# Patient Record
Sex: MALE | Race: OTHER | HISPANIC OR LATINO | URBAN - METROPOLITAN AREA
[De-identification: names, ages, dates, MRNs, and addresses within clinical notes are randomized per-mention and may not be internally consistent; named-entity substitution may affect disease eponyms.]

---

## 2021-03-07 ENCOUNTER — APPOINTMENT (EMERGENCY)
Dept: RADIOLOGY | Facility: HOSPITAL | Age: 76
DRG: 871 | End: 2021-03-07
Payer: COMMERCIAL

## 2021-03-07 ENCOUNTER — HOSPITAL ENCOUNTER (INPATIENT)
Facility: HOSPITAL | Age: 76
LOS: 9 days | Discharge: HOME HEALTH | DRG: 871 | End: 2021-03-16
Attending: EMERGENCY MEDICINE | Admitting: INTERNAL MEDICINE
Payer: COMMERCIAL

## 2021-03-07 DIAGNOSIS — J12.82 PNEUMONIA DUE TO COVID-19 VIRUS: ICD-10-CM

## 2021-03-07 DIAGNOSIS — I48.91 ATRIAL FIBRILLATION (HCC): ICD-10-CM

## 2021-03-07 DIAGNOSIS — I48.91 ATRIAL FIBRILLATION WITH RVR (HCC): ICD-10-CM

## 2021-03-07 DIAGNOSIS — R79.89 ELEVATED D-DIMER: ICD-10-CM

## 2021-03-07 DIAGNOSIS — U07.1 COVID-19: ICD-10-CM

## 2021-03-07 DIAGNOSIS — R06.00 DYSPNEA: ICD-10-CM

## 2021-03-07 DIAGNOSIS — U07.1 PNEUMONIA DUE TO COVID-19 VIRUS: ICD-10-CM

## 2021-03-07 DIAGNOSIS — R74.01 TRANSAMINITIS: ICD-10-CM

## 2021-03-07 DIAGNOSIS — R09.02 HYPOXIA: Primary | ICD-10-CM

## 2021-03-07 LAB
ALBUMIN SERPL BCP-MCNC: 2.6 G/DL (ref 3.5–5)
ALP SERPL-CCNC: 58 U/L (ref 46–116)
ALT SERPL W P-5'-P-CCNC: 79 U/L (ref 12–78)
ANION GAP SERPL CALCULATED.3IONS-SCNC: 6 MMOL/L (ref 4–13)
APTT PPP: 32 SECONDS (ref 23–37)
AST SERPL W P-5'-P-CCNC: 77 U/L (ref 5–45)
ATRIAL RATE: 468 BPM
BACTERIA UR QL AUTO: ABNORMAL /HPF
BASOPHILS # BLD MANUAL: 0 THOUSAND/UL (ref 0–0.1)
BASOPHILS NFR MAR MANUAL: 0 % (ref 0–1)
BILIRUB SERPL-MCNC: 0.9 MG/DL (ref 0.2–1)
BILIRUB UR QL STRIP: NEGATIVE
BUN SERPL-MCNC: 24 MG/DL (ref 5–25)
CALCIUM ALBUM COR SERPL-MCNC: 9.4 MG/DL (ref 8.3–10.1)
CALCIUM SERPL-MCNC: 8.3 MG/DL (ref 8.3–10.1)
CHLORIDE SERPL-SCNC: 107 MMOL/L (ref 100–108)
CLARITY UR: CLEAR
CO2 SERPL-SCNC: 26 MMOL/L (ref 21–32)
COLOR UR: ABNORMAL
CREAT SERPL-MCNC: 1.15 MG/DL (ref 0.6–1.3)
D DIMER PPP FEU-MCNC: 4.58 UG/ML FEU
EOSINOPHIL # BLD MANUAL: 0 THOUSAND/UL (ref 0–0.4)
EOSINOPHIL NFR BLD MANUAL: 0 % (ref 0–6)
ERYTHROCYTE [DISTWIDTH] IN BLOOD BY AUTOMATED COUNT: 16 % (ref 11.6–15.1)
GFR SERPL CREATININE-BSD FRML MDRD: 61 ML/MIN/1.73SQ M
GLUCOSE SERPL-MCNC: 120 MG/DL (ref 65–140)
GLUCOSE SERPL-MCNC: 131 MG/DL (ref 65–140)
GLUCOSE UR STRIP-MCNC: NEGATIVE MG/DL
HCT VFR BLD AUTO: 38.8 % (ref 36.5–49.3)
HGB BLD-MCNC: 12.1 G/DL (ref 12–17)
HGB UR QL STRIP.AUTO: ABNORMAL
INR PPP: 1.23 (ref 0.84–1.19)
KETONES UR STRIP-MCNC: NEGATIVE MG/DL
LACTATE SERPL-SCNC: 2.5 MMOL/L (ref 0.5–2)
LEUKOCYTE ESTERASE UR QL STRIP: NEGATIVE
LYMPHOCYTES # BLD AUTO: 0.57 THOUSAND/UL (ref 0.6–4.47)
LYMPHOCYTES # BLD AUTO: 6 % (ref 14–44)
MAGNESIUM SERPL-MCNC: 2.4 MG/DL (ref 1.6–2.6)
MCH RBC QN AUTO: 24.8 PG (ref 26.8–34.3)
MCHC RBC AUTO-ENTMCNC: 31.2 G/DL (ref 31.4–37.4)
MCV RBC AUTO: 80 FL (ref 82–98)
MONOCYTES # BLD AUTO: 0.09 THOUSAND/UL (ref 0–1.22)
MONOCYTES NFR BLD: 1 % (ref 4–12)
MUCOUS THREADS UR QL AUTO: ABNORMAL
NEUTROPHILS # BLD MANUAL: 8.53 THOUSAND/UL (ref 1.85–7.62)
NEUTS BAND NFR BLD MANUAL: 1 % (ref 0–8)
NEUTS SEG NFR BLD AUTO: 89 % (ref 43–75)
NITRITE UR QL STRIP: NEGATIVE
NON-SQ EPI CELLS URNS QL MICRO: ABNORMAL /HPF
NRBC BLD AUTO-RTO: 0 /100 WBCS
PH UR STRIP.AUTO: 6 [PH]
PLATELET # BLD AUTO: 264 THOUSANDS/UL (ref 149–390)
PLATELET BLD QL SMEAR: ADEQUATE
PLATELET CLUMP BLD QL SMEAR: PRESENT
PMV BLD AUTO: 11.3 FL (ref 8.9–12.7)
POLYCHROMASIA BLD QL SMEAR: PRESENT
POTASSIUM SERPL-SCNC: 4.6 MMOL/L (ref 3.5–5.3)
PROCALCITONIN SERPL-MCNC: 1.41 NG/ML
PROT SERPL-MCNC: 7.1 G/DL (ref 6.4–8.2)
PROT UR STRIP-MCNC: ABNORMAL MG/DL
PROTHROMBIN TIME: 15.5 SECONDS (ref 11.6–14.5)
QRS AXIS: 260 DEGREES
QRSD INTERVAL: 114 MS
QT INTERVAL: 262 MS
QTC INTERVAL: 406 MS
RBC # BLD AUTO: 4.87 MILLION/UL (ref 3.88–5.62)
RBC #/AREA URNS AUTO: ABNORMAL /HPF
RBC MORPH BLD: PRESENT
SODIUM SERPL-SCNC: 139 MMOL/L (ref 136–145)
SP GR UR STRIP.AUTO: 1.01 (ref 1–1.03)
T WAVE AXIS: 55 DEGREES
TROPONIN I SERPL-MCNC: <0.02 NG/ML
TSH SERPL DL<=0.05 MIU/L-ACNC: 1.46 UIU/ML (ref 0.36–3.74)
UROBILINOGEN UR QL STRIP.AUTO: 0.2 E.U./DL
VARIANT LYMPHS # BLD AUTO: 3 %
VENTRICULAR RATE: 145 BPM
WBC # BLD AUTO: 9.48 THOUSAND/UL (ref 4.31–10.16)
WBC #/AREA URNS AUTO: ABNORMAL /HPF

## 2021-03-07 PROCEDURE — 71045 X-RAY EXAM CHEST 1 VIEW: CPT

## 2021-03-07 PROCEDURE — 84145 PROCALCITONIN (PCT): CPT | Performed by: EMERGENCY MEDICINE

## 2021-03-07 PROCEDURE — 80053 COMPREHEN METABOLIC PANEL: CPT | Performed by: EMERGENCY MEDICINE

## 2021-03-07 PROCEDURE — 83605 ASSAY OF LACTIC ACID: CPT | Performed by: EMERGENCY MEDICINE

## 2021-03-07 PROCEDURE — 81001 URINALYSIS AUTO W/SCOPE: CPT | Performed by: EMERGENCY MEDICINE

## 2021-03-07 PROCEDURE — 83735 ASSAY OF MAGNESIUM: CPT | Performed by: EMERGENCY MEDICINE

## 2021-03-07 PROCEDURE — 99291 CRITICAL CARE FIRST HOUR: CPT | Performed by: EMERGENCY MEDICINE

## 2021-03-07 PROCEDURE — 82948 REAGENT STRIP/BLOOD GLUCOSE: CPT

## 2021-03-07 PROCEDURE — 93010 ELECTROCARDIOGRAM REPORT: CPT | Performed by: INTERNAL MEDICINE

## 2021-03-07 PROCEDURE — 87040 BLOOD CULTURE FOR BACTERIA: CPT | Performed by: EMERGENCY MEDICINE

## 2021-03-07 PROCEDURE — 85027 COMPLETE CBC AUTOMATED: CPT | Performed by: EMERGENCY MEDICINE

## 2021-03-07 PROCEDURE — G1004 CDSM NDSC: HCPCS

## 2021-03-07 PROCEDURE — 84443 ASSAY THYROID STIM HORMONE: CPT | Performed by: EMERGENCY MEDICINE

## 2021-03-07 PROCEDURE — 99285 EMERGENCY DEPT VISIT HI MDM: CPT

## 2021-03-07 PROCEDURE — 70450 CT HEAD/BRAIN W/O DYE: CPT

## 2021-03-07 PROCEDURE — 71275 CT ANGIOGRAPHY CHEST: CPT

## 2021-03-07 PROCEDURE — 85007 BL SMEAR W/DIFF WBC COUNT: CPT | Performed by: EMERGENCY MEDICINE

## 2021-03-07 PROCEDURE — 84484 ASSAY OF TROPONIN QUANT: CPT | Performed by: EMERGENCY MEDICINE

## 2021-03-07 PROCEDURE — 96365 THER/PROPH/DIAG IV INF INIT: CPT

## 2021-03-07 PROCEDURE — 93005 ELECTROCARDIOGRAM TRACING: CPT

## 2021-03-07 PROCEDURE — 36415 COLL VENOUS BLD VENIPUNCTURE: CPT | Performed by: EMERGENCY MEDICINE

## 2021-03-07 PROCEDURE — 85610 PROTHROMBIN TIME: CPT | Performed by: EMERGENCY MEDICINE

## 2021-03-07 PROCEDURE — 85379 FIBRIN DEGRADATION QUANT: CPT | Performed by: EMERGENCY MEDICINE

## 2021-03-07 PROCEDURE — 85730 THROMBOPLASTIN TIME PARTIAL: CPT | Performed by: EMERGENCY MEDICINE

## 2021-03-07 RX ORDER — ASCORBIC ACID 500 MG
1000 TABLET ORAL EVERY 12 HOURS SCHEDULED
Status: COMPLETED | OUTPATIENT
Start: 2021-03-07 | End: 2021-03-14

## 2021-03-07 RX ORDER — METOPROLOL TARTRATE 5 MG/5ML
5 INJECTION INTRAVENOUS ONCE
Status: DISCONTINUED | OUTPATIENT
Start: 2021-03-07 | End: 2021-03-08

## 2021-03-07 RX ORDER — ATORVASTATIN CALCIUM 40 MG/1
40 TABLET, FILM COATED ORAL
Status: DISCONTINUED | OUTPATIENT
Start: 2021-03-07 | End: 2021-03-10

## 2021-03-07 RX ORDER — DOCUSATE SODIUM 100 MG/1
100 CAPSULE, LIQUID FILLED ORAL 2 TIMES DAILY
Status: DISCONTINUED | OUTPATIENT
Start: 2021-03-07 | End: 2021-03-16 | Stop reason: HOSPADM

## 2021-03-07 RX ORDER — HEPARIN SODIUM 1000 [USP'U]/ML
3900 INJECTION, SOLUTION INTRAVENOUS; SUBCUTANEOUS
Status: DISCONTINUED | OUTPATIENT
Start: 2021-03-07 | End: 2021-03-09

## 2021-03-07 RX ORDER — DOXYCYCLINE HYCLATE 100 MG/1
100 CAPSULE ORAL EVERY 12 HOURS
Status: DISCONTINUED | OUTPATIENT
Start: 2021-03-07 | End: 2021-03-07

## 2021-03-07 RX ORDER — MELATONIN
2000 DAILY
Status: DISCONTINUED | OUTPATIENT
Start: 2021-03-08 | End: 2021-03-16 | Stop reason: HOSPADM

## 2021-03-07 RX ORDER — HEPARIN SODIUM 1000 [USP'U]/ML
3900 INJECTION, SOLUTION INTRAVENOUS; SUBCUTANEOUS ONCE
Status: COMPLETED | OUTPATIENT
Start: 2021-03-07 | End: 2021-03-07

## 2021-03-07 RX ORDER — FAMOTIDINE 20 MG/1
20 TABLET, FILM COATED ORAL 2 TIMES DAILY
Status: DISCONTINUED | OUTPATIENT
Start: 2021-03-07 | End: 2021-03-16 | Stop reason: HOSPADM

## 2021-03-07 RX ORDER — HEPARIN SODIUM 10000 [USP'U]/100ML
3-20 INJECTION, SOLUTION INTRAVENOUS
Status: DISCONTINUED | OUTPATIENT
Start: 2021-03-07 | End: 2021-03-09

## 2021-03-07 RX ORDER — MULTIVITAMIN/IRON/FOLIC ACID 18MG-0.4MG
1 TABLET ORAL DAILY
Status: DISCONTINUED | OUTPATIENT
Start: 2021-03-15 | End: 2021-03-16 | Stop reason: HOSPADM

## 2021-03-07 RX ORDER — HEPARIN SODIUM 1000 [USP'U]/ML
1950 INJECTION, SOLUTION INTRAVENOUS; SUBCUTANEOUS
Status: DISCONTINUED | OUTPATIENT
Start: 2021-03-07 | End: 2021-03-09

## 2021-03-07 RX ORDER — SENNOSIDES 8.6 MG
1 TABLET ORAL DAILY
Status: DISCONTINUED | OUTPATIENT
Start: 2021-03-08 | End: 2021-03-16 | Stop reason: HOSPADM

## 2021-03-07 RX ORDER — ACETAMINOPHEN 325 MG/1
650 TABLET ORAL EVERY 6 HOURS PRN
Status: DISCONTINUED | OUTPATIENT
Start: 2021-03-07 | End: 2021-03-10

## 2021-03-07 RX ORDER — DEXAMETHASONE SODIUM PHOSPHATE 4 MG/ML
6 INJECTION, SOLUTION INTRA-ARTICULAR; INTRALESIONAL; INTRAMUSCULAR; INTRAVENOUS; SOFT TISSUE EVERY 24 HOURS
Status: DISCONTINUED | OUTPATIENT
Start: 2021-03-07 | End: 2021-03-16 | Stop reason: HOSPADM

## 2021-03-07 RX ORDER — ZINC SULFATE 50(220)MG
220 CAPSULE ORAL DAILY
Status: COMPLETED | OUTPATIENT
Start: 2021-03-08 | End: 2021-03-14

## 2021-03-07 RX ORDER — ONDANSETRON 2 MG/ML
4 INJECTION INTRAMUSCULAR; INTRAVENOUS EVERY 6 HOURS PRN
Status: DISCONTINUED | OUTPATIENT
Start: 2021-03-07 | End: 2021-03-16 | Stop reason: HOSPADM

## 2021-03-07 RX ORDER — DOXYCYCLINE HYCLATE 100 MG/1
100 CAPSULE ORAL EVERY 12 HOURS
Status: DISCONTINUED | OUTPATIENT
Start: 2021-03-07 | End: 2021-03-13

## 2021-03-07 RX ADMIN — CEFTRIAXONE 1000 MG: 2 INJECTION, POWDER, FOR SOLUTION INTRAMUSCULAR; INTRAVENOUS at 18:59

## 2021-03-07 RX ADMIN — DEXAMETHASONE SODIUM PHOSPHATE 6 MG: 4 INJECTION INTRA-ARTICULAR; INTRALESIONAL; INTRAMUSCULAR; INTRAVENOUS; SOFT TISSUE at 22:43

## 2021-03-07 RX ADMIN — ACETAMINOPHEN 650 MG: 325 TABLET ORAL at 22:35

## 2021-03-07 RX ADMIN — ATORVASTATIN CALCIUM 40 MG: 40 TABLET, FILM COATED ORAL at 22:43

## 2021-03-07 RX ADMIN — FAMOTIDINE 20 MG: 20 TABLET ORAL at 23:03

## 2021-03-07 RX ADMIN — OXYCODONE HYDROCHLORIDE AND ACETAMINOPHEN 1000 MG: 500 TABLET ORAL at 22:43

## 2021-03-07 RX ADMIN — HEPARIN SODIUM 3900 UNITS: 1000 INJECTION INTRAVENOUS; SUBCUTANEOUS at 22:47

## 2021-03-07 RX ADMIN — DOCUSATE SODIUM 100 MG: 100 CAPSULE, LIQUID FILLED ORAL at 23:03

## 2021-03-07 RX ADMIN — IOHEXOL 85 ML: 350 INJECTION, SOLUTION INTRAVENOUS at 19:24

## 2021-03-07 RX ADMIN — DILTIAZEM HYDROCHLORIDE 5 MG/HR: 5 INJECTION INTRAVENOUS at 20:39

## 2021-03-07 RX ADMIN — HEPARIN SODIUM 12 UNITS/KG/HR: 10000 INJECTION, SOLUTION INTRAVENOUS at 22:47

## 2021-03-07 RX ADMIN — DOXYCYCLINE 100 MG: 100 CAPSULE ORAL at 18:49

## 2021-03-07 RX ADMIN — SODIUM CHLORIDE 500 ML: 0.9 INJECTION, SOLUTION INTRAVENOUS at 18:58

## 2021-03-07 NOTE — ED ATTENDING ATTESTATION
3/7/2021  IHarsha MD, saw and evaluated the patient  I have discussed the patient with the resident/non-physician practitioner and agree with the resident's/non-physician practitioner's findings, Plan of Care, and MDM as documented in the resident's/non-physician practitioner's note, except where noted  All available labs and Radiology studies were reviewed  I was present for key portions of any procedure(s) performed by the resident/non-physician practitioner and I was immediately available to provide assistance  At this point I agree with the current assessment done in the Emergency Department  I have conducted an independent evaluation of this patient a history and physical is as follows:    OA:  This is evaluation of a 57-year-old male with no significant past medical history who presents to the emergency department with daughter complaining of shortness of breath, fatigue and episode of confusion earlier today  Per patient as well as daughter, he tested positive for COVID on 03/01 however had been dealing with nasal congestion like symptoms for 1 week prior to that  Patient has had decreased oral intake and decreased activity over the past 5 days  Denies any abdominal pain Daughter visited today and noted that he seemed more short of breath than normal   Also appeared confused to her earlier today but denies any focal symptoms or difficulty speaking numbness weakness tingling or falls  No fevers reported  Does not take any daily medications  On exam patient is oriented and conversive but he is tachypneic with respirations in the high 20s  Heart rate appears to be irregular and oscillating between 100 and 130s  No history of the same  Lungs decreased throughout  Tachypnea  Speaks in complete sentences  Abdomen is soft positive bowel sounds no rebound or guarding nontender to palpation  Intact distal pulses and capillary refill less than 2 seconds  No lower extremity edema  Moving all extremities equally with symmetric face and clear speech  Awake alert oriented appropriate  Assessment and plan COVID  Concern for worsening disease given tachypnea tachycardia cannot rule out primary pulmonary process associated with this versus anemia versus cardiac process  Will do a broad workup including labs, EKG, gentle IV fluid hydration  May require a rate control or if fluids do not help heart rate  Will require continued monitoring and admission  Portions of the record may have been created with voice recognition software  Occasional wrong word or sound-a-like" substitutions may have occurred due to the inherent limitations of voice recognition software  Review chart carefully and recognize, using context, where substitutions have occurred  ED Course   -120 however bumps to 140  Will require treatment, close monitoring of BP and HR  Respiratory status improved on supplemental 02         Critical Care Time  CriticalCare Time  Performed by: Ellen Dye MD  Authorized by: Ellen Dye MD     Critical care provider statement:     Critical care time (minutes):  32    Critical care time was exclusive of:  Separately billable procedures and treating other patients and teaching time    Critical care was necessary to treat or prevent imminent or life-threatening deterioration of the following conditions:  Circulatory failure, sepsis and dehydration    Critical care was time spent personally by me on the following activities:  Blood draw for specimens, obtaining history from patient or surrogate, development of treatment plan with patient or surrogate, discussions with consultants, evaluation of patient's response to treatment, examination of patient, ordering and performing treatments and interventions, ordering and review of laboratory studies, ordering and review of radiographic studies and re-evaluation of patient's condition

## 2021-03-07 NOTE — ED PROVIDER NOTES
History  Chief Complaint   Patient presents with    Shortness of Breath     Pt tested positive for covid on 3/1  Pt states yesterday his sx's started getting worse  Pt having more severe sob  Per daughter, when she checked his O2 level this afternoon it was 88% on room air and pt's pcp told her to bring him to the ED  Patient is a 26-year-old Omani-speaking male, with no history of AFib or pulmonary pathology, who presents to the ED today, with his daughter, due to hypoxia  There is associated nonexertional shortness of breath, diarrhea, and altered mental status  Patient denies fever, chest pain, and abdominal pain  Notably, patient has been experiencing symptoms of the last two weeks knee tested positive for coronavirus on the 1st of this month  Yesterday, patient experienced a exacerbation of his shortness of breath  His daughter went to visit him, at his home where he resides with his wife, this morning and noted that he did not recognize her and appeared confused  She placed her pulse oximeter on him and noted that it read 88%; she subsequently called a family doctor and it was recommended that he come to the emergency department  There are no clear exacerbating or remitting factors  Patient has not experienced anything like this before and he denies history of asthma or COPD      - Patient is Omani-speaking, hospital translation services for use throughout the patient encounter  - History obtained from patient as well as his daughter   - There are no limitations to the history obtained  - Previous charting was reviewed          None       History reviewed  No pertinent past medical history  History reviewed  No pertinent surgical history  History reviewed  No pertinent family history  I have reviewed and agree with the history as documented      E-Cigarette/Vaping     E-Cigarette/Vaping Substances     Social History     Tobacco Use    Smoking status: Never Smoker    Smokeless tobacco: Never Used   Substance Use Topics    Alcohol use: Not Currently    Drug use: Never        Review of Systems   Constitutional: Negative for fever  HENT: Negative for trouble swallowing  Eyes: Negative for visual disturbance  Respiratory: Positive for shortness of breath  Negative for cough and wheezing  Cardiovascular: Negative for chest pain  Gastrointestinal: Positive for diarrhea  Negative for abdominal pain and vomiting  Endocrine: Negative for polyuria  Genitourinary: Negative for dysuria  Musculoskeletal: Negative for gait problem  Skin: Negative for rash  Allergic/Immunologic: Negative for environmental allergies  Neurological: Negative for weakness and numbness  Hematological: Negative for adenopathy  Psychiatric/Behavioral: Positive for confusion  Physical Exam  ED Triage Vitals [03/07/21 1750]   Temperature Pulse Respirations Blood Pressure SpO2   99 3 °F (37 4 °C) (!) 130 (!) 40 149/67 93 %      Temp Source Heart Rate Source Patient Position - Orthostatic VS BP Location FiO2 (%)   Oral Monitor Lying Right arm --      Pain Score       --             Orthostatic Vital Signs  Vitals:    03/07/21 1750   BP: 149/67   Pulse: (!) 130   Patient Position - Orthostatic VS: Lying       Physical Exam  Vitals signs and nursing note reviewed  Constitutional:       Appearance: He is normal weight  He is ill-appearing  He is not toxic-appearing  Interventions: He is not intubated  HENT:      Head: Normocephalic  Right Ear: External ear normal       Left Ear: External ear normal       Nose: Nose normal  No congestion  Mouth/Throat:      Mouth: Mucous membranes are moist       Pharynx: Oropharynx is clear  No oropharyngeal exudate or posterior oropharyngeal erythema  Eyes:      General:         Right eye: No discharge  Left eye: No discharge  Pupils: Pupils are equal, round, and reactive to light  Neck:      Musculoskeletal: Neck supple   No muscular tenderness  Cardiovascular:      Rate and Rhythm: Tachycardia present  Rhythm irregular  Pulses: Normal pulses  Heart sounds: Normal heart sounds  No murmur  No friction rub  No gallop  Comments: 2+ Radial  Pulmonary:      Effort: Tachypnea and accessory muscle usage present  He is not intubated  Breath sounds: No stridor  Examination of the right-upper field reveals rhonchi  Examination of the left-upper field reveals rhonchi  Examination of the right-middle field reveals rhonchi  Examination of the left-middle field reveals rhonchi  Examination of the right-lower field reveals rhonchi  Examination of the left-lower field reveals rhonchi  Rhonchi present  No decreased breath sounds, wheezing or rales  Comments: Increased respiratory effort, tachypnea  Abdominal:      General: Abdomen is flat  Bowel sounds are normal  There is no distension  Palpations: Abdomen is soft  Tenderness: There is no abdominal tenderness  There is no guarding or rebound  Musculoskeletal:         General: No tenderness  Right lower leg: No edema  Left lower leg: No edema  Lymphadenopathy:      Cervical: No cervical adenopathy  Skin:     General: Skin is warm and dry  Capillary Refill: Capillary refill takes less than 2 seconds  Neurological:      Mental Status: He is alert  Comments: AAOx3  Patient is speaking clearly complete sentences  Patient is answering appropriately and able follow commands  No facial droop present   PERRL   Psychiatric:         Mood and Affect: Mood normal          Behavior: Behavior normal          ED Medications  Medications   ceftriaxone (ROCEPHIN) 1 g/50 mL in dextrose IVPB (0 mg Intravenous Stopped 3/7/21 1945)   doxycycline hyclate (VIBRAMYCIN) capsule 100 mg (100 mg Oral Given 3/7/21 1849)   diltiazem (CARDIZEM) 125 mg in sodium chloride 0 9 % 125 mL infusion (has no administration in time range)   sodium chloride 0 9 % bolus 500 mL (0 mL Intravenous Stopped 3/7/21 2011)   iohexol (OMNIPAQUE) 350 MG/ML injection (MULTI-DOSE) 85 mL (85 mL Intravenous Given 3/7/21 1924)       Diagnostic Studies  Results Reviewed     Procedure Component Value Units Date/Time    Magnesium [[id number]]  (Normal) Collected: 03/07/21 1810    Lab Status: Final result Specimen: Blood from Arm, Left Updated: 03/07/21 1932     Magnesium 2 4 mg/dL     Lactic acid [[id number]]  (Abnormal) Collected: 03/07/21 1810    Lab Status: Final result Specimen: Blood from Arm, Left Updated: 03/07/21 1929     LACTIC ACID 2 5 mmol/L     Narrative:      Result may be elevated if tourniquet was used during collection  Lactic acid 2 Hours [[id number]]     Lab Status: No result Specimen: Blood     Procalcitonin with AM Reflex [[id number]]  (Abnormal) Collected: 03/07/21 1810    Lab Status: Final result Specimen: Blood from Arm, Left Updated: 03/07/21 1906     Procalcitonin 1 41 ng/ml     Procalcitonin Reflex [[id number]]     Lab Status: No result Specimen: Blood     Blood culture #1 [[id number]] Collected: 03/07/21 1857    Lab Status: In process Specimen: Blood from Arm, Right Updated: 03/07/21 1904    D-dimer, quantitative [[id number]]  (Abnormal) Collected: 03/07/21 1810    Lab Status: Final result Specimen: Blood from Arm, Left Updated: 03/07/21 1901     D-Dimer, Quant 4 58 ug/ml FEU     CBC and differential [[id number]]  (Abnormal) Collected: 03/07/21 1810    Lab Status: Final result Specimen: Blood from Arm, Left Updated: 03/07/21 1900     WBC 9 48 Thousand/uL      RBC 4 87 Million/uL      Hemoglobin 12 1 g/dL      Hematocrit 38 8 %      MCV 80 fL      MCH 24 8 pg      MCHC 31 2 g/dL      RDW 16 0 %      MPV 11 3 fL      Platelets 728 Thousands/uL      nRBC 0 /100 WBCs     Narrative: This is an appended report  These results have been appended to a previously verified report      Manual Differential(PHLEBS Do Not Order) [[id number]]  (Abnormal) Collected: 03/07/21 1810    Lab Status: Final result Specimen: Blood from Arm, Left Updated: 03/07/21 1900     Segmented % 89 %      Bands % 1 %      Lymphocytes % 6 %      Monocytes % 1 %      Eosinophils, % 0 %      Basophils % 0 %      Atypical Lymphocytes % 3 %      Absolute Neutrophils 8 53 Thousand/uL      Lymphocytes Absolute 0 57 Thousand/uL      Monocytes Absolute 0 09 Thousand/uL      Eosinophils Absolute 0 00 Thousand/uL      Basophils Absolute 0 00 Thousand/uL      Total Counted --     RBC Morphology Present     Polychromasia Present     Platelet Estimate Adequate     Clumped Platelets Present    TSH, 3rd generation with Free T4 reflex [[id number]]  (Normal) Collected: 03/07/21 1814    Lab Status: Final result Specimen: Blood from Line Updated: 03/07/21 1857     TSH 3RD GENERATON 1 460 uIU/mL     Narrative:      Patients undergoing fluorescein dye angiography may retain small amounts of fluorescein in the body for 48-72 hours post procedure  Samples containing fluorescein can produce falsely depressed TSH values  If the patient had this procedure,a specimen should be resubmitted post fluorescein clearance        APTT [[id number]]  (Normal) Collected: 03/07/21 1810    Lab Status: Final result Specimen: Blood from Arm, Left Updated: 03/07/21 1854     PTT 32 seconds     Protime-INR [[id number]]  (Abnormal) Collected: 03/07/21 1810    Lab Status: Final result Specimen: Blood from Arm, Left Updated: 03/07/21 1854     Protime 15 5 seconds      INR 1 23    Comprehensive metabolic panel [[id number]]  (Abnormal) Collected: 03/07/21 1810    Lab Status: Final result Specimen: Blood from Arm, Left Updated: 03/07/21 1847     Sodium 139 mmol/L      Potassium 4 6 mmol/L      Chloride 107 mmol/L      CO2 26 mmol/L      ANION GAP 6 mmol/L      BUN 24 mg/dL      Creatinine 1 15 mg/dL      Glucose 131 mg/dL      Calcium 8 3 mg/dL      Corrected Calcium 9 4 mg/dL      AST 77 U/L      ALT 79 U/L      Alkaline Phosphatase 58 U/L      Total Protein 7 1 g/dL      Albumin 2 6 g/dL      Total Bilirubin 0 90 mg/dL      eGFR 61 ml/min/1 73sq m     Narrative:      [place] guidelines for Chronic Kidney Disease (CKD):     Stage 1 with normal or high GFR (GFR > 90 mL/min/1 73 square meters)    Stage 2 Mild CKD (GFR = 60-89 mL/min/1 73 square meters)    Stage 3A Moderate CKD (GFR = 45-59 mL/min/1 73 square meters)    Stage 3B Moderate CKD (GFR = 30-44 mL/min/1 73 square meters)    Stage 4 Severe CKD (GFR = 15-29 mL/min/1 73 square meters)    Stage 5 End Stage CKD (GFR <15 mL/min/1 73 square meters)  Note: GFR calculation is accurate only with a steady state creatinine    Troponin I [[id number]]  (Normal) Collected: 03/07/21 1814    Lab Status: Final result Specimen: Blood from Arm, Left Updated: 03/07/21 1847     Troponin I <0 02 ng/mL     Fingerstick Glucose (POCT) [[id number]]  (Normal) Collected: 03/07/21 1844    Lab Status: Final result Updated: 03/07/21 1846     POC Glucose 120 mg/dl     Blood culture #2 [[id number]] Collected: 03/07/21 1810    Lab Status: In process Specimen: Blood from Arm, Left Updated: 03/07/21 1824    UA w Reflex to Microscopic w Reflex to Culture [[id number]]     Lab Status: No result Specimen: Urine                  CT head without contrast   Final Result by Jemima Najera MD (1945)      Acute pansinusitis  No sign of an acute transcortical infarction  No acute intracranial hemorrhage  Workstation performed: [id number]         CTA ED chest PE Study   Final Result by Ritika Cherry MD (03/07 1948)      No evidence of pulmonary emboli  Extensive groundglass infiltrates related to Covid pneumonia  Trace pleural effusions        Mild mediastinal and hilar adenopathy                  Workstation performed: [id number]         XR chest 1 view portable   ED Interpretation by Shaan Pierre MD (03/07 1928)   On my personal read the x-ray, patchy interstitial infiltrates seen diffusely Procedures  Procedures      ED Course  ED Course as of Mar 07 2025   Tyson Birmingham Mar 07, 2021   1900 Troponin I: <0 02   1900 TSH 3RD GENERATON: 1 460   1913 D-Dimer, Quant(!): 4 58   1913 Procalcitonin(!): 1 41   1928 Patient states that he is feeling good  He appears more comfortable at this time  His heart rate is in the low 100s  1931 Patient is COVID+   LACTIC ACID(!!): 2 5   1946 CT Head Final Read: No sign of an acute transcortical infarction  No acute intracranial hemorrhage  1946 EKG: Tachycardic (145), irregularly irregular rhythm, deviated axis, no ST elevations  ABNORMAL EKG      1950 CT PE Final Read: No evidence of pulmonary emboli      Extensive groundglass infiltrates related to Covid pneumonia      Trace pleural effusions      Mild mediastinal and hilar adenopathy      2025 Patient is admitted to [facility]                  Identification of Seniors at Risk      Most Recent Value   (ISAR) Identification of Seniors at Risk   Before the illness or injury that brought you to the Emergency, did you need someone to help you on a regular basis? 0 Filed at: 03/07/2021 1753   In the last 24 hours, have you needed more help than usual?  1 Filed at: 03/07/2021 1753   Have you been hospitalized for one or more nights during the past 6 months? 0 Filed at: 03/07/2021 1753   In general, do you see well?  0 Filed at: 03/07/2021 1753   In general, do you have serious problems with your memory? 0 Filed at: 03/07/2021 1753   Do you take more than three different medications every day?  0 Filed at: 03/07/2021 1753   ISAR Score  1 Filed at: 03/07/2021 1753                Initial Sepsis Screening     Row Name 03/07/21 1834                Is the patient's history suggestive of a new or worsening infection? (!) Yes (Proceed)  -CL        Suspected source of infection  pneumonia  -CL        Are two or more of the following signs & symptoms of infection both present and new to the patient?   (!) Yes (Proceed) Patient is COVID+  SIRS believed to be secondary to coronavirus or pulmonary embolism  -CL        Indicate SIRS criteria  --        If the answer is yes to both questions, suspicion of sepsis is present  --        If severe sepsis is present AND tissue hypoperfusion perists in the hour after fluid resuscitation or lactate > 4, the patient meets criteria for SEPTIC SHOCK  --        Are any of the following organ dysfunction criteria present within 6 hours of suspected infection and SIRS criteria that are NOT considered to be chronic conditions?   --        Organ dysfunction  --        Date of presentation of severe sepsis  --        Time of presentation of severe sepsis  --        Tissue hypoperfusion persists in the hour after crystalloid fluid administration, evidenced, by either:  --        Was hypotension present within one hour of the conclusion of crystalloid fluid administration?  --        Date of presentation of septic shock  --        Time of presentation of septic shock  --          User Key  (r) = Recorded By, (t) = Taken By, (c) = Cosigned By    [address] Name Provider Type     Sky Kong MD Resident              Sage Davalos Criteria for PE      Most Recent Value   Wells' Criteria for PE   Clinical signs and symptoms of DVT  0 Filed at: 03/07/2021 1817   PE is primary diagnosis or equally likely  3 Filed at: 03/07/2021 1817   HR >100  1 5 Filed at: 03/07/2021 1817   Immobilization at least 3 days or Surgery in the previous 4 weeks  0 Filed at: 03/07/2021 1817   Previous, objectively diagnosed PE or DVT  0 Filed at: 03/07/2021 1817   Hemoptysis  0 Filed at: 03/07/2021 1817   Malignancy with treatment within 6 months or palliative  0 Filed at: 03/07/2021 1817   Wells' Criteria Total  4 5 Filed at: 03/07/2021 1817            MDM  Number of Diagnoses or Management Options  Diagnosis management comments: Patient is a 68-year-old Nepali-speaking male, with no history of AFib or pulmonary pathology, who presents to the ED today, with his daughter, due to hypoxia  There is associated nonexertional shortness of breath, diarrhea, and altered mental status  Patient denies fever, chest pain, and abdominal pain  Notably, patient has been experiencing symptoms of the last two weeks knee tested positive for coronavirus on the 1st of this month  Yesterday, patient experienced a exacerbation of his shortness of breath and, today, he was hypoxic to 88% on room air  On arrival, patient was afebrile, tachycardic (with an irregularly irregular rhythm on the monitor), tachypneic, hemodynamically stable  He had accessory muscle use with respirations along with increased work of breathing  His physical exam is also notable for coarse rhonchi heard throughout the lung fields  Patient was alert and oriented x3 and speaking clearly, while using the , in complete sentences  This presentation is concerning for:  Coronavirus, ACS, pulmonary embolism, Afib, hyperthyroidism, ICH  Will investigate with septic workup, D-dimer, troponin, TSH, CT head, CT PE  Will manage with small fluid bolus, antibiotics, further management as indicated by workup        Disposition  Final diagnoses:   Hypoxia   Dyspnea   Elevated d-dimer   COVID-19   Atrial fibrillation ([facility] )     Time reflects when diagnosis was documented in both MDM as applicable and the Disposition within this note     Time User Action Codes Description Comment    3/7/2021  7:58 PM Antonina Silvio A Add [R09 02] Hypoxia     3/7/2021  7:58 PM Antonina Silvio A Add [R06 00] Dyspnea     3/7/2021  7:58 PM Antonina Silvio A Add [R79 89] Elevated d-dimer     3/7/2021  7:59 PM Antonina Silvio Add [U07 1] COVID-19     3/7/2021  7:59 PM Antonina Silvio Add [I48 91] A-fib ([facility] )     3/7/2021  8:00 PM Antonina Silvio Add [I48 91] [facility]rial fibrillation ([facility] )     3/7/2021  8:00 PM Lonroque Noeler [I48 91] A-fib [facility])       ED Disposition     ED Disposition Condition Date/Time Comment    Admit Joan Lewis Mar 7, 2021  8:24 PM Case was discussed with [facility] and the patient's admission status was agreed to be Admission Status: inpatient status to the service of Dr Mayra Angulo   Follow-up Information    None         Patient's Medications    No medications on file     No discharge procedures on file  PDMP Review     None           ED Provider  Attending physically available and evaluated [facility]  I managed the patient along with the ED Attending      Electronically Signed by         Mary Quigley MD  03/07/21 2025

## 2021-03-08 PROBLEM — R74.01 TRANSAMINITIS: Status: ACTIVE | Noted: 2021-03-08

## 2021-03-08 PROBLEM — J96.01 ACUTE RESPIRATORY FAILURE WITH HYPOXIA (HCC): Status: ACTIVE | Noted: 2021-03-08

## 2021-03-08 LAB
ALBUMIN SERPL BCP-MCNC: 2.1 G/DL (ref 3.5–5)
ALP SERPL-CCNC: 49 U/L (ref 46–116)
ALT SERPL W P-5'-P-CCNC: 66 U/L (ref 12–78)
ANION GAP SERPL CALCULATED.3IONS-SCNC: 9 MMOL/L (ref 4–13)
APTT PPP: 48 SECONDS (ref 23–37)
APTT PPP: 52 SECONDS (ref 23–37)
APTT PPP: 54 SECONDS (ref 23–37)
APTT PPP: 67 SECONDS (ref 23–37)
AST SERPL W P-5'-P-CCNC: 52 U/L (ref 5–45)
ATRIAL RATE: 102 BPM
ATRIAL RATE: 58 BPM
BILIRUB SERPL-MCNC: 0.88 MG/DL (ref 0.2–1)
BUN SERPL-MCNC: 21 MG/DL (ref 5–25)
CALCIUM ALBUM COR SERPL-MCNC: 9.4 MG/DL (ref 8.3–10.1)
CALCIUM SERPL-MCNC: 7.9 MG/DL (ref 8.3–10.1)
CHLORIDE SERPL-SCNC: 107 MMOL/L (ref 100–108)
CK SERPL-CCNC: 89 U/L (ref 39–308)
CO2 SERPL-SCNC: 23 MMOL/L (ref 21–32)
CREAT SERPL-MCNC: 0.69 MG/DL (ref 0.6–1.3)
CRP SERPL QL: 207 MG/L
D DIMER PPP FEU-MCNC: 3.5 UG/ML FEU
ERYTHROCYTE [DISTWIDTH] IN BLOOD BY AUTOMATED COUNT: 16.3 % (ref 11.6–15.1)
FERRITIN SERPL-MCNC: 299 NG/ML (ref 8–388)
GFR SERPL CREATININE-BSD FRML MDRD: 92 ML/MIN/1.73SQ M
GLUCOSE SERPL-MCNC: 126 MG/DL (ref 65–140)
HBV CORE AB SER QL: NORMAL
HBV CORE IGM SER QL: NORMAL
HBV SURFACE AG SER QL: NORMAL
HCT VFR BLD AUTO: 37.7 % (ref 36.5–49.3)
HCV AB SER QL: NORMAL
HGB BLD-MCNC: 11.8 G/DL (ref 12–17)
LACTATE SERPL-SCNC: 2.2 MMOL/L (ref 0.5–2)
LACTATE SERPL-SCNC: 2.3 MMOL/L (ref 0.5–2)
LACTATE SERPL-SCNC: 3.1 MMOL/L (ref 0.5–2)
LACTATE SERPL-SCNC: 3.3 MMOL/L (ref 0.5–2)
LACTATE SERPL-SCNC: 3.5 MMOL/L (ref 0.5–2)
MCH RBC QN AUTO: 25.4 PG (ref 26.8–34.3)
MCHC RBC AUTO-ENTMCNC: 31.3 G/DL (ref 31.4–37.4)
MCV RBC AUTO: 81 FL (ref 82–98)
NRBC BLD AUTO-RTO: 0 /100 WBCS
NT-PROBNP SERPL-MCNC: 2943 PG/ML
PLATELET # BLD AUTO: 239 THOUSANDS/UL (ref 149–390)
PMV BLD AUTO: 12.6 FL (ref 8.9–12.7)
POTASSIUM SERPL-SCNC: 3.8 MMOL/L (ref 3.5–5.3)
PROCALCITONIN SERPL-MCNC: 1.18 NG/ML
PROT SERPL-MCNC: 6.2 G/DL (ref 6.4–8.2)
QRS AXIS: -82 DEGREES
QRS AXIS: -84 DEGREES
QRSD INTERVAL: 124 MS
QRSD INTERVAL: 130 MS
QT INTERVAL: 386 MS
QT INTERVAL: 392 MS
QTC INTERVAL: 515 MS
QTC INTERVAL: 519 MS
RBC # BLD AUTO: 4.64 MILLION/UL (ref 3.88–5.62)
SODIUM SERPL-SCNC: 139 MMOL/L (ref 136–145)
T WAVE AXIS: -27 DEGREES
T WAVE AXIS: 22 DEGREES
VENTRICULAR RATE: 104 BPM
VENTRICULAR RATE: 109 BPM
WBC # BLD AUTO: 9.34 THOUSAND/UL (ref 4.31–10.16)

## 2021-03-08 PROCEDURE — XW033E5 INTRODUCTION OF REMDESIVIR ANTI-INFECTIVE INTO PERIPHERAL VEIN, PERCUTANEOUS APPROACH, NEW TECHNOLOGY GROUP 5: ICD-10-PCS | Performed by: INTERNAL MEDICINE

## 2021-03-08 PROCEDURE — 93010 ELECTROCARDIOGRAM REPORT: CPT | Performed by: INTERNAL MEDICINE

## 2021-03-08 PROCEDURE — 86705 HEP B CORE ANTIBODY IGM: CPT | Performed by: STUDENT IN AN ORGANIZED HEALTH CARE EDUCATION/TRAINING PROGRAM

## 2021-03-08 PROCEDURE — 80053 COMPREHEN METABOLIC PANEL: CPT | Performed by: STUDENT IN AN ORGANIZED HEALTH CARE EDUCATION/TRAINING PROGRAM

## 2021-03-08 PROCEDURE — 85027 COMPLETE CBC AUTOMATED: CPT | Performed by: STUDENT IN AN ORGANIZED HEALTH CARE EDUCATION/TRAINING PROGRAM

## 2021-03-08 PROCEDURE — 85730 THROMBOPLASTIN TIME PARTIAL: CPT | Performed by: STUDENT IN AN ORGANIZED HEALTH CARE EDUCATION/TRAINING PROGRAM

## 2021-03-08 PROCEDURE — 86803 HEPATITIS C AB TEST: CPT | Performed by: STUDENT IN AN ORGANIZED HEALTH CARE EDUCATION/TRAINING PROGRAM

## 2021-03-08 PROCEDURE — 93005 ELECTROCARDIOGRAM TRACING: CPT

## 2021-03-08 PROCEDURE — 83605 ASSAY OF LACTIC ACID: CPT | Performed by: STUDENT IN AN ORGANIZED HEALTH CARE EDUCATION/TRAINING PROGRAM

## 2021-03-08 PROCEDURE — 86704 HEP B CORE ANTIBODY TOTAL: CPT | Performed by: STUDENT IN AN ORGANIZED HEALTH CARE EDUCATION/TRAINING PROGRAM

## 2021-03-08 PROCEDURE — 97166 OT EVAL MOD COMPLEX 45 MIN: CPT

## 2021-03-08 PROCEDURE — 82550 ASSAY OF CK (CPK): CPT | Performed by: STUDENT IN AN ORGANIZED HEALTH CARE EDUCATION/TRAINING PROGRAM

## 2021-03-08 PROCEDURE — 97162 PT EVAL MOD COMPLEX 30 MIN: CPT

## 2021-03-08 PROCEDURE — 87340 HEPATITIS B SURFACE AG IA: CPT | Performed by: STUDENT IN AN ORGANIZED HEALTH CARE EDUCATION/TRAINING PROGRAM

## 2021-03-08 PROCEDURE — 82728 ASSAY OF FERRITIN: CPT | Performed by: STUDENT IN AN ORGANIZED HEALTH CARE EDUCATION/TRAINING PROGRAM

## 2021-03-08 PROCEDURE — 83880 ASSAY OF NATRIURETIC PEPTIDE: CPT | Performed by: STUDENT IN AN ORGANIZED HEALTH CARE EDUCATION/TRAINING PROGRAM

## 2021-03-08 PROCEDURE — 85379 FIBRIN DEGRADATION QUANT: CPT | Performed by: STUDENT IN AN ORGANIZED HEALTH CARE EDUCATION/TRAINING PROGRAM

## 2021-03-08 PROCEDURE — 84145 PROCALCITONIN (PCT): CPT | Performed by: STUDENT IN AN ORGANIZED HEALTH CARE EDUCATION/TRAINING PROGRAM

## 2021-03-08 PROCEDURE — 85730 THROMBOPLASTIN TIME PARTIAL: CPT | Performed by: INTERNAL MEDICINE

## 2021-03-08 PROCEDURE — 86140 C-REACTIVE PROTEIN: CPT | Performed by: STUDENT IN AN ORGANIZED HEALTH CARE EDUCATION/TRAINING PROGRAM

## 2021-03-08 RX ORDER — SODIUM CHLORIDE, SODIUM GLUCONATE, SODIUM ACETATE, POTASSIUM CHLORIDE, MAGNESIUM CHLORIDE, SODIUM PHOSPHATE, DIBASIC, AND POTASSIUM PHOSPHATE .53; .5; .37; .037; .03; .012; .00082 G/100ML; G/100ML; G/100ML; G/100ML; G/100ML; G/100ML; G/100ML
500 INJECTION, SOLUTION INTRAVENOUS ONCE
Status: DISCONTINUED | OUTPATIENT
Start: 2021-03-08 | End: 2021-03-10

## 2021-03-08 RX ORDER — ALBUMIN, HUMAN INJ 5% 5 %
25 SOLUTION INTRAVENOUS ONCE
Status: COMPLETED | OUTPATIENT
Start: 2021-03-08 | End: 2021-03-08

## 2021-03-08 RX ORDER — SODIUM CHLORIDE, SODIUM GLUCONATE, SODIUM ACETATE, POTASSIUM CHLORIDE, MAGNESIUM CHLORIDE, SODIUM PHOSPHATE, DIBASIC, AND POTASSIUM PHOSPHATE .53; .5; .37; .037; .03; .012; .00082 G/100ML; G/100ML; G/100ML; G/100ML; G/100ML; G/100ML; G/100ML
500 INJECTION, SOLUTION INTRAVENOUS ONCE
Status: COMPLETED | OUTPATIENT
Start: 2021-03-08 | End: 2021-03-09

## 2021-03-08 RX ORDER — SODIUM CHLORIDE, SODIUM GLUCONATE, SODIUM ACETATE, POTASSIUM CHLORIDE, MAGNESIUM CHLORIDE, SODIUM PHOSPHATE, DIBASIC, AND POTASSIUM PHOSPHATE .53; .5; .37; .037; .03; .012; .00082 G/100ML; G/100ML; G/100ML; G/100ML; G/100ML; G/100ML; G/100ML
75 INJECTION, SOLUTION INTRAVENOUS CONTINUOUS
Status: DISPENSED | OUTPATIENT
Start: 2021-03-08 | End: 2021-03-09

## 2021-03-08 RX ORDER — SODIUM CHLORIDE, SODIUM GLUCONATE, SODIUM ACETATE, POTASSIUM CHLORIDE, MAGNESIUM CHLORIDE, SODIUM PHOSPHATE, DIBASIC, AND POTASSIUM PHOSPHATE .53; .5; .37; .037; .03; .012; .00082 G/100ML; G/100ML; G/100ML; G/100ML; G/100ML; G/100ML; G/100ML
500 INJECTION, SOLUTION INTRAVENOUS ONCE
Status: DISCONTINUED | OUTPATIENT
Start: 2021-03-08 | End: 2021-03-08

## 2021-03-08 RX ADMIN — OXYCODONE HYDROCHLORIDE AND ACETAMINOPHEN 1000 MG: 500 TABLET ORAL at 08:19

## 2021-03-08 RX ADMIN — ALBUMIN (HUMAN) 25 G: 12.5 INJECTION, SOLUTION INTRAVENOUS at 20:09

## 2021-03-08 RX ADMIN — HEPARIN SODIUM 1950 UNITS: 1000 INJECTION INTRAVENOUS; SUBCUTANEOUS at 20:36

## 2021-03-08 RX ADMIN — REMDESIVIR 200 MG: 100 INJECTION, POWDER, LYOPHILIZED, FOR SOLUTION INTRAVENOUS at 01:37

## 2021-03-08 RX ADMIN — CEFTRIAXONE 1000 MG: 2 INJECTION, POWDER, FOR SOLUTION INTRAMUSCULAR; INTRAVENOUS at 18:02

## 2021-03-08 RX ADMIN — SODIUM CHLORIDE, SODIUM GLUCONATE, SODIUM ACETATE, POTASSIUM CHLORIDE, MAGNESIUM CHLORIDE, SODIUM PHOSPHATE, DIBASIC, AND POTASSIUM PHOSPHATE 100 ML/HR: .53; .5; .37; .037; .03; .012; .00082 INJECTION, SOLUTION INTRAVENOUS at 16:01

## 2021-03-08 RX ADMIN — DOCUSATE SODIUM 100 MG: 100 CAPSULE, LIQUID FILLED ORAL at 18:02

## 2021-03-08 RX ADMIN — DEXAMETHASONE SODIUM PHOSPHATE 6 MG: 4 INJECTION INTRA-ARTICULAR; INTRALESIONAL; INTRAMUSCULAR; INTRAVENOUS; SOFT TISSUE at 20:33

## 2021-03-08 RX ADMIN — SODIUM CHLORIDE, SODIUM GLUCONATE, SODIUM ACETATE, POTASSIUM CHLORIDE, MAGNESIUM CHLORIDE, SODIUM PHOSPHATE, DIBASIC, AND POTASSIUM PHOSPHATE 500 ML: .53; .5; .37; .037; .03; .012; .00082 INJECTION, SOLUTION INTRAVENOUS at 08:23

## 2021-03-08 RX ADMIN — STANDARDIZED SENNA CONCENTRATE 8.6 MG: 8.6 TABLET ORAL at 08:21

## 2021-03-08 RX ADMIN — DOXYCYCLINE 100 MG: 100 CAPSULE ORAL at 05:34

## 2021-03-08 RX ADMIN — SODIUM CHLORIDE, SODIUM GLUCONATE, SODIUM ACETATE, POTASSIUM CHLORIDE, MAGNESIUM CHLORIDE, SODIUM PHOSPHATE, DIBASIC, AND POTASSIUM PHOSPHATE 500 ML: .53; .5; .37; .037; .03; .012; .00082 INJECTION, SOLUTION INTRAVENOUS at 02:33

## 2021-03-08 RX ADMIN — HEPARIN SODIUM 1950 UNITS: 1000 INJECTION INTRAVENOUS; SUBCUTANEOUS at 05:39

## 2021-03-08 RX ADMIN — FAMOTIDINE 20 MG: 20 TABLET ORAL at 08:21

## 2021-03-08 RX ADMIN — HEPARIN SODIUM 1950 UNITS: 1000 INJECTION INTRAVENOUS; SUBCUTANEOUS at 12:01

## 2021-03-08 RX ADMIN — ZINC SULFATE 220 MG (50 MG) CAPSULE 220 MG: CAPSULE at 08:21

## 2021-03-08 RX ADMIN — SODIUM CHLORIDE, SODIUM GLUCONATE, SODIUM ACETATE, POTASSIUM CHLORIDE, MAGNESIUM CHLORIDE, SODIUM PHOSPHATE, DIBASIC, AND POTASSIUM PHOSPHATE 100 ML/HR: .53; .5; .37; .037; .03; .012; .00082 INJECTION, SOLUTION INTRAVENOUS at 09:41

## 2021-03-08 RX ADMIN — DOXYCYCLINE 100 MG: 100 CAPSULE ORAL at 18:02

## 2021-03-08 RX ADMIN — Medication 2000 UNITS: at 08:20

## 2021-03-08 RX ADMIN — HEPARIN SODIUM 18 UNITS/KG/HR: 10000 INJECTION, SOLUTION INTRAVENOUS at 20:34

## 2021-03-08 RX ADMIN — DOCUSATE SODIUM 100 MG: 100 CAPSULE, LIQUID FILLED ORAL at 08:21

## 2021-03-08 RX ADMIN — ATORVASTATIN CALCIUM 40 MG: 40 TABLET, FILM COATED ORAL at 21:19

## 2021-03-08 RX ADMIN — FAMOTIDINE 20 MG: 20 TABLET ORAL at 18:02

## 2021-03-08 RX ADMIN — OXYCODONE HYDROCHLORIDE AND ACETAMINOPHEN 1000 MG: 500 TABLET ORAL at 20:34

## 2021-03-08 NOTE — SEPSIS NOTE
Sepsis Note   Marii Ellis 66 y o  male MRN: [id number]  Unit/Bed#: ED 21 Encounter: [id number]      qSOFA     Row Name 03/07/21 1800 03/07/21 1750             Altered mental status GCS < 15  0  --       Respiratory Rate > / =22  --  1       Systolic BP < / =458  --  0       Q Sofa Score  1  1           Initial Sepsis Screening     Row Name 03/07/21 1834                Is the patient's history suggestive of a new or worsening infection? (!) Yes (Proceed)  -CL        Suspected source of infection  pneumonia  -CL        Are two or more of the following signs & symptoms of infection both present and new to the patient? (!) Yes (Proceed) Patient is COVID+  SIRS believed to be secondary to coronavirus or pulmonary embolism  -CL        Indicate SIRS criteria  --        If the answer is yes to both questions, suspicion of sepsis is present  --        If severe sepsis is present AND tissue hypoperfusion perists in the hour after fluid resuscitation or lactate > 4, the patient meets criteria for SEPTIC SHOCK  --        Are any of the following organ dysfunction criteria present within 6 hours of suspected infection and SIRS criteria that are NOT considered to be chronic conditions?   --        Organ dysfunction  --        Date of presentation of severe sepsis  --        Time of presentation of severe sepsis  --        Tissue hypoperfusion persists in the hour after crystalloid fluid administration, evidenced, by either:  --        Was hypotension present within one hour of the conclusion of crystalloid fluid administration?  --        Date of presentation of septic shock  --        Time of presentation of septic shock  --          User Key  (r) = Recorded By, (t) = Taken By, (c) = Cosigned By    [address] Name Provider Meggan Clements MD Resident

## 2021-03-08 NOTE — ASSESSMENT & PLAN NOTE
AST ALT 70s  Likely due to recent infection  Chronic hepatitis panel negative  Continue to trend LFTs

## 2021-03-08 NOTE — UTILIZATION REVIEW
Initial Clinical Review    Admission: Date/Time/Statement:   Admission Orders (From admission, onward)     Ordered        03/07/21 2025  Inpatient Admission  Once                   Orders Placed This Encounter   Procedures    Inpatient Admission     Standing Status:   Standing     Number of Occurrences:   1     Order Specific Question:   Level of Care     Answer:   Level 2 Stepdown / HOT [14]     Order Specific Question:   Estimated length of stay     Answer:   More than 2 Midnights     Order Specific Question:   Certification     Answer:   I certify that inpatient services are medically necessary for this patient for a duration of greater than two midnights  See H&P and MD Progress Notes for additional information about the patient's course of treatment  ED Arrival Information     Expected Arrival Acuity Means of Arrival Escorted By Service Admission Type    - 3/7/2021 17:26 Emergent Wheelchair Family Member General Medicine Emergency    Arrival Complaint    SOB        Chief Complaint   Patient presents with    Shortness of Breath     Pt tested positive for covid on 3/1  Pt states yesterday his sx's started getting worse  Pt having more severe sob  Per daughter, when she checked his O2 level this afternoon it was 88% on room air and pt's pcp told her to bring him to the ED  Assessment/Plan:      66year old male presents to ed from home for evaluaton and treatment of shortness of breath with positive covid 19 test on 3-1  Daughter reports his O2 sat was 8% ra  Clinical assessment significant for fever, tachycardia tachypnea, hypotension, O2 sat 91%  Accessory muscle usage , bilateral rhonchi  Procalcitonin 1 41, lactic acid 2 5, d dimer 4 58,bnp 2943, crp 207,  Imaging  Shows extensive ground glass infiltrates, Ekg shows Afib  Treated in ed with iv ceftriaxone, iv  9% ns bolus, doxycycline, iv cardizem gtt  IN ed  -120 however bumps to 140   Will require treatment, close monitoring of BP and HR  Respiratory status improved on supplemental 02    Admit to inpatient medical surgical for covid 19 pneumonia, new onset afib    Plan includes: isolation, iv remdesivir, iv dexamethasone, iv ceftriaxone, iv heparin gtt, iv fluids, trend inflammatory markers, supplemental oxygen, follow up blood cultures  New onset Afib   Patient with new onset AFib 1st noted in the ED with heart rates in 130s  Last E[facility] at [facility] showing right bundle branch block 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  Patient is not on anticoagulation at home although he was previously taking aspirin daily but stopped due to fatigue  CHADS-VASc 2 due to patient age  Although patient was hypertensive in the ED, review of prior records showsnormal blood pressures at previous office visits  Patient also does not have a documented history of CAD and never underwent catheterization  S/p cardizem drip in the ED without improvement in heart rates  Denies chest pain, orthopnea, leg swelling    - TSH within normal limits  - CTA no evidence of PE  - Possibly due to acute COVID infection vs prior MI       Plan:  - continue cardizem drip for now  - start heparin ACS low  - IV lopressor 5mg once, will reassess   - closely monitor HR and blood pressures  - patient will need to be started on anticoagulation prior to discharge          3-8-21  Inflammatory markers trending down  O2 sats 95% oon 3L O2nc  Continue iv antibiotic, iv steroid, heparin gtt, iv remdesivir, isolation        ED Triage Vitals   03/07/21 1750 03/07/21 1750 03/07/21 1750 03/07/21 1750 03/07/21 1750   99 3 °F (37 4 °C) (!) 130 (!) 40 149/67 93 %      Oral Monitor         Pain Score          03/08/21 67 kg (147 lb 11 3 oz)     Additional Vital Signs:       Date/  Time  Temp  Pulse  Resp  BP  MAP (mmHg)  SpO2   Nasal Cannula O2 Flow Rate (L/min)     03/08/21 09:46:33  --  --  --  107/71  83  --   --     03/08/21 0900  96 5 °F (35 8 °C)  Abnormal    --  --  --  --  --   -- 03/08/21 07:31:17  --  98  18  93/61  72  95 %   --     03/08/21 03:15:46  --  81  19  94/63  73  91 %   --     03/08/21 0055  --  --  --  --  --  --   3 L/min     03/08/21 00:26:20  97 4 °F (36 3 °C)  Abnormal   106  Abnormal   --  --  --  93 %   --     03/07/21 22:32:49  102 7 °F (39 3 °C)  Abnormal   102  --  107/68  81  92 %   --                 Pertinent Labs/Diagnostic Test Results:     Collection Time Result Time Vent R Atrial R MS Int  QRSD Int  QT Int  QTC Int  P Axis QRS Axis T Wave Ax    03/07/21 18:08:52 03/07/21 21:46:46 145 468  114 262 406  260 55         Atrial fibrillation with rapid ventricular response   Pulmonary disease pattern   Right bundle branch block   Inferior infarct , age undetermined   Abnormal ECG   No previous ECGs available         CT head without contrast   (1943)      Acute pansinusitis  No sign of an acute transcortical infarction  No acute intracranial hemorrhage  CTA ED chest PE Study    (03/07 1948)      No evidence of pulmonary emboli  Extensive groundglass infiltrates related to Covid pneumonia  Trace pleural effusions  Mild mediastinal and hilar adenopathy      XR chest 1 view portable       (03/08 0941)      Diffuse patchy groundglass opacity throughout the lung fields  No effusions or pneumothorax  In the setting of clinically suspected/proven COVID-19, this plain film appearance while nonspecific, can be seen in cases of viral pneumonia such as COVID-19              Results from last 7 days   Lab Units 03/08/21  0552 03/07/21  1810   WBC Thousand/uL 9 34 9 48   HEMOGLOBIN g/dL 11 8* 12 1   HEMATOCRIT % 37 7 38 8   PLATELETS Thousands/uL 239 264   BANDS PCT %  --  1         Results from last 7 days   Lab Units 03/08/21  0552 03/07/21  1810   SODIUM mmol/L 139 139   POTASSIUM mmol/L 3 8 4 6   CHLORIDE mmol/L 107 107   CO2 mmol/L 23 26   ANION GAP mmol/L 9 6   BUN mg/dL 21 24   CREATININE mg/dL 0 69 1 15   EGFR ml/min/1 73sq m 92 61 CALCIUM mg/dL 7 9* 8 3   MAGNESIUM mg/dL  --  2 4     Results from last 7 days   Lab Units 03/08/21  0552 03/07/21  1810   AST U/L 52* 77*   ALT U/L 66 79*   ALK PHOS U/L 49 58   TOTAL PROTEIN g/dL 6 2* 7 1   ALBUMIN g/dL 2 1* 2 6*   TOTAL BILIRUBIN mg/dL 0 88 0 90     Results from last 7 days   Lab Units 03/07/21  1844   POC GLUCOSE mg/dl 120     Results from last 7 days   Lab Units 03/08/21  0552 03/07/21  1810   GLUCOSE RANDOM mg/dL 126 131       Results from last 7 days   Lab Units 03/08/21  0552   CK TOTAL U/L 89     Results from last 7 days   Lab Units 03/07/21  1814   TROPONIN I ng/mL <0 02     Results from last 7 days   Lab Units 03/08/21  0552 03/07/21  1810   D-DIMER QUANTITATIVE ug/ml FEU 3 50* 4 58*     Results from last 7 days   Lab Units 03/08/21  0454 03/08/21  0038 03/07/21  1810   PROTIME seconds  --   --  15 5*   INR   --   --  1 23*   PTT seconds 48* 67* 32     Results from last 7 days   Lab Units 03/07/21  1814   TSH 3RD GENERATON uIU/mL 1 460     Results from last 7 days   Lab Units 03/08/21  0552 03/07/21  1810   PROCALCITONIN ng/ml 1 18* 1 41*     Results from last 7 days   Lab Units 03/08/21  0454 03/08/21  0037 03/07/21  1810   LACTIC ACID mmol/L 2 3* 2 2* 2 5*       Results from last 7 days   Lab Units 03/08/21  0552   NT-PRO BNP pg/mL 2,943*     Results from last 7 days   Lab Units 03/08/21  0552   FERRITIN ng/mL 299       Results from last 7 days   Lab Units 03/08/21  0552   CRP mg/L 207 0*         Results from last 7 days   Lab Units 03/07/21  2041   CLARITY UA  Clear   COLOR UA  Lillie   SPEC GRAV UA  1 010   PH UA  6 0   GLUCOSE UA mg/dl Negative   KETONES UA mg/dl Negative   BLOOD UA  Trace*   PROTEIN UA mg/dl 300 (3+)*   NITRITE UA  Negative   BILIRUBIN UA  Negative   UROBILINOGEN UA E U /dl 0 2   LEUKOCYTES UA  Negative   WBC UA /hpf None Seen   RBC UA /hpf None Seen   BACTERIA UA /hpf Occasional   EPITHELIAL CELLS WET PREP /hpf Occasional   MUCUS THREADS  Occasional*       Results from last 7 days   Lab Units 03/07/21  1857 03/07/21  1810   BLOOD CULTURE  Received in Microbiology Lab  Culture in Progress  Received in Microbiology Lab  Culture in Progress  ED Treatment:   Medication Administration from 03/07/2021 1725 to 03/07/2021 2201       Date/Time Order Dose Route Action     03/07/2021 1859 ceftriaxone (ROCEPHIN) 1 g/50 mL in dextrose IVPB 1,000 mg Intravenous New Bag     03/07/2021 1849 doxycycline hyclate (VIBRAMYCIN) capsule 100 mg 100 mg Oral Given     03/07/2021 1858 sodium chloride 0 9 % bolus 500 mL 500 mL Intravenous New Bag     03/07/2021 2039 diltiazem (CARDIZEM) 125 mg in sodium chloride 0 9 % 125 mL infusion 5 mg/hr Intravenous New Bag        History reviewed  No pertinent past medical history    Present on Admission:   Atrial fibrillation with RVR (HCC)      Admitting Diagnosis:     Atrial fibrillation (HCC) [I48 91]  Shortness of breath [R06 02]  Dyspnea [R06 00]  Hypoxia [R09 02]  Elevated d-dimer [R79 89]  COVID-19 [U07 1]    Age/Sex: 66 y o  male     Admission Orders:    Scheduled Medications:  ascorbic acid, 1,000 mg, Oral, Q12H ELIE  atorvastatin, 40 mg, Oral, HS  cefTRIAXone, 1,000 mg, Intravenous, Q24H  cholecalciferol, 2,000 Units, Oral, Daily  dexamethasone, 6 mg, Intravenous, Q24H  docusate sodium, 100 mg, Oral, BID  doxycycline hyclate, 100 mg, Oral, Q12H  famotidine, 20 mg, Oral, BID  metoprolol tartrate, 12 5 mg, Oral, Q12H [address]  zinc sulfate, 220 mg, Oral, Daily    Followed by  Maye Salomon ON 3/15/2021] multivitamin-minerals, 1 tablet, Oral, Daily  [START ON 3/9/2021] remdesivir, 100 mg, Intravenous, Q24H  senna, 1 tablet, Oral, Daily      Continuous IV Infusions:  heparin (porcine), 3-20 Units/kg/hr (Order-Specific), Intravenous, Titrated  multi-electrolyte, 100 mL/hr, Intravenous, Continuous      PRN Meds:  acetaminophen, 650 mg, Oral, Q6H PRN  heparin (porcine), 1,950 Units, Intravenous, Q1H PRN  heparin (porcine), 3,900 Units, Intravenous, Q1H PRN  ondansetron, 4 mg, Intravenous, Q6H PRN        IP CONSULT TO CASE MANAGEMENT    Network Utilization Review Department  ATTENTION: Please call with any questions or concerns to [phone] and carefully listen to the prompts so that you are directed to the right person  All voicemails are confidential   Arlen Baldwin all requests for admission clinical reviews, approved or denied determinations and any other requests to dedicated fax number below belonging to the campus where the patient is receiving treatment   List of dedicated fax numbers for the Facilities:  [address] DENIALS (Administrative/Medical Necessity) [phone]   [address] (Maternity/NICU/Pediatrics) [phone] [address] Hanna Romero 4628 (Tomas Carrion "[address]" [address]) [address] [place] [address] [address] [place] [address] [phone]

## 2021-03-08 NOTE — RESPIRATORY THERAPY NOTE
RT Protocol Note  Rg Platt 66 y o  male MRN: [id number]  Unit/Bed#: -01 Encounter: [id number]    Assessment    Principal Problem:    Pneumonia due to COVID-19 virus  Active Problems:    Atrial fibrillation with RVR ([address] )      Home Pulmonary Medications:  none       History reviewed  No pertinent past medical history  Social History     Socioeconomic History    Marital status: /Civil Union     Spouse name: None    Number of children: None    Years of education: None    Highest education level: None   Occupational History    None   Social Needs    Financial resource strain: None    Food insecurity     Worry: None     Inability: None    Transportation needs     Medical: None     Non-medical: None   Tobacco Use    Smoking status: Never Smoker    Smokeless tobacco: Never Used   Substance and Sexual Activity    Alcohol use: Not Currently    Drug use: Never    Sexual activity: None   Lifestyle    Physical activity     Days per week: None     Minutes per session: None    Stress: None   Relationships    Social connections     Talks on phone: None     Gets together: None     Attends [employer] service: None     Active member of club or organization: None     Attends meetings of clubs or organizations: None     Relationship status: None    Intimate partner violence     Fear of current or ex partner: None     Emotionally abused: None     Physically abused: None     Forced sexual activity: None   Other Topics Concern    None   Social History Narrative    None       Subjective         Objective    Physical Exam:   Assessment Type: Assess only  General Appearance: Awake  Respiratory Pattern: Normal  Chest Assessment: Chest expansion symmetrical    Vitals:  Blood pressure 149/67, pulse (!) 130, temperature 99 3 °F (37 4 °C), temperature source Oral, resp  rate (!) 40, weight 67 kg (147 lb 11 3 oz), SpO2 93 %  Imaging and other studies: I have personally reviewed pertinent reports  Plan    Respiratory Plan: Discontinue Protocol        Resp Comments: Pt admitted with confusion, weakness, and increased wob  Pt found to be COVID positive  Pt has no other pulm hx, and no meds at home  Due to COVID dianosis pt will be given MDI therapy instead of UDN therapy  I will start albuterol MDI Q4prn for wheezing

## 2021-03-08 NOTE — PROGRESS NOTES
INTERNAL MEDICINE RESIDENCY PROGRESS NOTE     Name: Evens Kraft   Age & Sex: 66 y o  male   MRN: [id number]  Unit/Bed#: -01   Encounter: [id number]  Team: SOD Team B     PATIENT INFORMATION     Name: Evens Kraft   Age & Sex: 66 y o  male   MRN: [id number]  Hospital Stay Days: 1    ASSESSMENT/PLAN     Principal Problem:    Pneumonia due to COVID-19 virus  Active Problems:    Atrial fibrillation with RVR ([address] )    Acute respiratory failure with hypoxia (HCC)    Transaminitis      Transaminitis  Assessment & Plan  AST ALT 70s  Likely due to recent infection  Chronic hepatitis panel negative  Continue to trend LFTs    Acute respiratory failure with hypoxia ([address] )  Assessment & Plan  Due to COVID pneumonia  Appears euvolemic on exam  BNP however was elevated at 2900, no prior baseline  Echocardiogram ordered and pending  Continue to wean oxygen    Atrial fibrillation with RVR ([facility] [address] )  Assessment & Plan  Patient with new onset AFib 1st noted in the ED with heart rates in 130s  Last EKG at [facility] showing right bundle branch block 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  Patient is not on anticoagulation at home although he was previously taking aspirin daily but stopped due to fatigue  CHADS-VASc 2 due to patient age  Although patient was hypertensive in the ED, review of prior records showsnormal blood pressures at previous office visits  Patient also does not have a documented history of CAD and never underwent catheterization  S/p cardizem drip in the ED without improvement in heart rates  Denies chest pain, orthopnea, leg swelling    - TSH within normal limits  - CTA no evidence of PE  - Possibly due to acute COVID infection vs prior MI       Plan:  Rate controlled  Echocardiogram pending  - switch patient from Cardizem to low-dose Lopressor 12 5 b i d   - blood pressures soft, increasing IV fluid hydration  - if patient has sustained AFib with RVR will consider starting patient on amiodarone given his hypotension  - continue heparin drip, price check for Xarelto given to   - closely monitor HR and blood pressures on telemetry    * Pneumonia due to COVID-19 virus  Assessment & Plan  Patient with approximately two weeks of shortness of breath, fevers, chills, myalgias, diarrhea, poor PO intake, fatigue  Tested positive for COVID on 3/1/21 but had been having symptoms before that  Diarrhea has resolved  Per daughter, patient appeared more confused today  Patient satting 88% on room air on arrival to ED with improvement to 96% on 4L nasal cannula  - Labs: AST 77, ALT 79, albumin 2 6, lactic acid 2 5, procalcitonin 1 41, d-dimer 4 58, blood cultures pending, procal 1 41  -D-dimer trending downward, BNP elevated at 2900, no prior baseline  - Chest x-ray in the ED showing diffuse patchy infiltrates  - CT head showing acute pansinusitis, patient asymptomatic  - CTA showed no evidence of pulmonary emboli but with extensive ground-glass infiltrates related to COVID pneumonia and trace pleural effusions  - no smoking or drug history    Plan:   - COVID moderate pathway  - continue remdesivir, Decadron, ceftriaxone, doxycycline  - trend inflammatory markers  - f/u blood cultures  - f/u chronic hepatitis panel  - anticoagulation with heparin drip  - wean O2 as tolerated  - aggressive pulmonary hygeine        Disposition:  Currently on COVID pathway on 4 L oxygen, anticipate discharge later this week if clinical improvement  SUBJECTIVE     Patient seen and examined  No acute events overnight  Patient denies any symptoms including shortness of breath, chest pain, nausea, vomiting  Tolerating p o  Diet  Rest of ROS was negative  CYRCOM Interpretor was used      OBJECTIVE     Vitals:    03/08/21 0900 03/08/21 0946 03/08/21 1100 03/08/21 1212   BP:  107/71  106/67   Pulse:    93   Resp:       Temp: (!) 96 5 °F (35 8 °C)      TempSrc: Rectal      SpO2:   93% 92%   Weight:       Height:          Temperature: Temp (24hrs), Av °F (37 2 °C), Min:96 5 °F (35 8 °C), Max:102 7 °F (39 3 °C)    Temperature: (!) 96 5 °F (35 8 °C)(SODB made aware)  Intake & Output:  I/O       / 0701 -  0700 / 07 -  07 07 -  0700    IV Piggyback  550     Total Intake(mL/kg)  550 (8 2)     Net  +550                Weights:   IBW: 68 4 kg    Body mass index is 22 46 kg/m²  Weight (last 2 days)     Date/Time   Weight    21 0300   67 (147 71)    21 1953   67 (147 71)            Physical Exam  Constitutional:       General: He is not in acute distress  Appearance: Normal appearance  He is ill-appearing  Interventions: Nasal cannula in place  HENT:      Head: Normocephalic and atraumatic  Mouth/Throat:      Mouth: Mucous membranes are moist       Pharynx: Oropharynx is clear  Eyes:      Extraocular Movements: Extraocular movements intact  Conjunctiva/sclera: Conjunctivae normal       Pupils: Pupils are equal, round, and reactive to light  Cardiovascular:      Rate and Rhythm: Normal rate and regular rhythm  Pulses: Normal pulses  Heart sounds: Normal heart sounds  No murmur  No friction rub  No gallop  Pulmonary:      Effort: Pulmonary effort is normal  No respiratory distress  Breath sounds: Examination of the right-lower field reveals rhonchi  Examination of the left-lower field reveals rhonchi  Rhonchi present  No wheezing or rales  Abdominal:      General: Abdomen is flat  Bowel sounds are normal  There is no distension  Palpations: Abdomen is soft  Tenderness: There is no abdominal tenderness  There is no guarding  Musculoskeletal:      Right lower leg: No edema  Left lower leg: No edema  Skin:     General: Skin is warm and dry  Neurological:      General: No focal deficit present  Mental Status: He is alert and oriented to person, place, and time     Psychiatric:         Mood and Affect: Mood normal          Behavior: Behavior normal         LABORATORY DATA     Labs: I have personally reviewed pertinent reports  Results from last 7 days   Lab Units 03/08/21  0552 03/07/21  1810   WBC Thousand/uL 9 34 9 48   HEMOGLOBIN g/dL 11 8* 12 1   HEMATOCRIT % 37 7 38 8   PLATELETS Thousands/uL 239 264   MONO PCT %  --  1*      Results from last 7 days   Lab Units 03/08/21  0552 03/07/21  1810   POTASSIUM mmol/L 3 8 4 6   CHLORIDE mmol/L 107 107   CO2 mmol/L 23 26   BUN mg/dL 21 24   CREATININE mg/dL 0 69 1 15   CALCIUM mg/dL 7 9* 8 3   ALK PHOS U/L 49 58   ALT U/L 66 79*   AST U/L 52* 77*     Results from last 7 days   Lab Units 03/07/21  1810   MAGNESIUM mg/dL 2 4          Results from last 7 days   Lab Units 03/08/21  1112 03/08/21  0454 03/08/21  0038 03/07/21  1810   INR   --   --   --  1 23*   PTT seconds 52* 48* 67* 32     Results from last 7 days   Lab Units 03/08/21  0454   LACTIC ACID mmol/L 2 3*     Results from last 7 days   Lab Units 03/07/21  1814   TROPONIN I ng/mL <0 02       IMAGING & DIAGNOSTIC TESTING     Radiology Results: I have personally reviewed pertinent reports  Xr Chest 1 View Portable    Result Date: 3/8/2021  Impression: Diffuse patchy groundglass opacity throughout the lung fields  No effusions or pneumothorax  In the setting of clinically suspected/proven COVID-19, this plain film appearance while nonspecific, can be seen in cases of viral pneumonia such as COVID-19  Workstation performed: [id number]     Ct Head Without Contrast    Result Date: 3/7/2021  Impression: Acute pansinusitis  No sign of an acute transcortical infarction  No acute intracranial hemorrhage  Workstation performed: [id number]     Cta Ed Chest Pe Study    Result Date: 3/7/2021  Impression: No evidence of pulmonary emboli  Extensive groundglass infiltrates related to Covid pneumonia  Trace pleural effusions   Mild mediastinal and hilar adenopathy Workstation performed: [id number]     Other Diagnostic Testing: I have personally reviewed pertinent reports  ACTIVE MEDICATIONS     Current Facility-Administered Medications   Medication Dose Route Frequency    acetaminophen (TYLENOL) tablet 650 mg  650 mg Oral Q6H PRN    ascorbic acid (VITAMIN C) tablet 1,000 mg  1,000 mg Oral Q12H [facility]    atorvastatin (LIPITOR) tablet 40 mg  40 mg Oral HS    ceftriaxone (ROCEPHIN) 1 g/50 mL in dextrose IVPB  1,000 mg Intravenous Q24H    cholecalciferol (VITAMIN D3) tablet 2,000 Units  2,000 Units Oral Daily    dexamethasone (DECADRON) injection 6 mg  6 mg Intravenous Q24H    docusate sodium (COLACE) capsule 100 mg  100 mg Oral BID    doxycycline hyclate (VIBRAMYCIN) capsule 100 mg  100 mg Oral Q12H    famotidine (PEPCID) tablet 20 mg  20 mg Oral BID    heparin (porcine) 25,000 units in 0 45% NaCl 250 mL infusion (premix)  3-20 Units/kg/hr (Order-Specific) Intravenous Titrated    heparin (porcine) injection 1,950 Units  1,950 Units Intravenous Q1H PRN    heparin (porcine) injection 3,900 Units  3,900 Units Intravenous Q1H PRN    metoprolol tartrate (LOPRESSOR) partial tablet 12 5 mg  12 5 mg Oral Q12H ELIE    multi-electrolyte (PLASMALYTE-A/ISOLYTE-S PH 7 4) IV solution  100 mL/hr Intravenous Continuous    zinc sulfate (ZINCATE) capsule 220 mg  220 mg Oral Daily    Followed by   Shanna Ugarte ON 3/15/2021] multivitamin-minerals (CENTRUM ADULTS) tablet 1 tablet  1 tablet Oral Daily    ondansetron (ZOFRAN) injection 4 mg  4 mg Intravenous Q6H PRN    [START ON 3/9/2021] remdesivir (Veklury) 100 mg in sodium chloride 0 9 % 250 mL IVPB  100 mg Intravenous Q24H    senna (SENOKOT) tablet 8 6 mg  1 tablet Oral Daily       VTE Pharmacologic Prophylaxis: Heparin  VTE Mechanical Prophylaxis: sequential compression device    Portions of the record may have been created with voice recognition software  Occasional wrong word or "sound a like" substitutions may have occurred due to the inherent limitations of voice recognition software    Read the chart carefully and recognize, using context, where substitutions have occurred   ==  Early Slight, [facility]  Internal Medicine Residency PGY-2

## 2021-03-08 NOTE — QUICK NOTE
QUICK NOTE - Deterioration Index  Evens Kraft 66 y o  male MRN: [id number]  Unit/Bed#: -01 Encounter: [id number]    Date Paged: 21  Time Paged: 52  Room #: 0  Arrival Time: 52  Deterioration index score at time of page: 65 2  %  Spoke with SOD from primary team  Need to escalate level of care: no     PROBLEMS resulting in high DI score:   46% Respiratory rate is 40   19% Age is 75   17% Supplemental oxygen is Nasal cannula   6% Cardiac rhythm is Atrial fibrillation   4% Potassium is 4 6 mmol/L   3% Pulse is 100   2% WBC count is 9 48 Thousand/uL   2% Pulse oximetry is 93 %   1% Systolic is 534   1% Sodium is 139 mmol/L   <1% Temperature is 97 4 °F (36 3          PLAN:     Pt is on 3 liters O2 and sating 94 %    On cardizem for A fib    Monitor for decline chest xray is b/l ground glass opacities    On covid pathway         Please contact critical care via [employer] with any questions or concerns       Vitals:   Vitals:    21 1750 21 1953 21 2232 21 0026   BP: 149/67  107/68    BP Location: Right arm      Pulse: (!) 130  102 (!) 106   Resp: (!) 40      Temp: 99 3 °F (37 4 °C)  (!) 102 7 °F (39 3 °C) (!) 97 4 °F (36 3 °C)   TempSrc: Oral      SpO2: 93%  92% 93%   Weight:  67 kg (147 lb 11 3 oz)         Respiratory:  SpO2: SpO2: 93 %, SpO2 Activity: SpO2 Activity: At Rest, SpO2 Device: O2 Device: Nasal cannula       Temperature: Temp (24hrs), Av 8 °F (37 7 °C), Min:97 4 °F (36 3 °C), Max:102 7 °F (39 3 °C)  Current: Temperature: (!) 97 4 °F (36 3 °C)    Labs:   Results from last 7 days   Lab Units 21  1810   WBC Thousand/uL 9 48   HEMOGLOBIN g/dL 12 1   HEMATOCRIT % 38 8   PLATELETS Thousands/uL 264   MONO PCT % 1*     Results from last 7 days   Lab Units 21  1810   SODIUM mmol/L 139   POTASSIUM mmol/L 4 6   CHLORIDE mmol/L 107   CO2 mmol/L 26   BUN mg/dL 24   CREATININE mg/dL 1 15   CALCIUM mg/dL 8 3   ALK PHOS U/L 58   ALT U/L 79*   AST U/L 77* Results from last 7 days   Lab Units 03/07/21  1810   MAGNESIUM mg/dL 2 4     Results from last 7 days   Lab Units 03/07/21  1810   LACTIC ACID mmol/L 2 5*     Results from last 7 days   Lab Units 03/07/21  1814   TROPONIN I ng/mL <0 02     Results from last 7 days   Lab Units 03/07/21  1810   PROCALCITONIN ng/ml 1 41*       Code Status: Level 1 - Full Code

## 2021-03-08 NOTE — CASE MANAGEMENT
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, pt drives and is retired  Pt reported no use of DME in the home, pt also denied any hx of STR  PCP is Dr Laurence Carmichael; pharmacy of choice is [facility] in [place]  Pt denied any hx of MH or substance abuse  CM then received a TC from pt's dtr Mina Butler who reported that she would like to be updated on pt's progress while in the hospital  Mina Butler can be contacted at [phone]  Mina Butler also confirmed that the above information is correct  CM reviewed d/c planning process including the following: identifying help at home, patient preference for d/c planning needs, Discharge Lounge, [facility] Meds to Bed program, availability of treatment team to discuss questions or concerns patient and/or family may have regarding understanding medications and recognizing signs and symptoms once discharged  CM also encouraged patient to follow up with all recommended appointments after discharge  Patient advised of importance for patient and family to participate in managing patients medical well being  Patient/caregiver received discharge checklist  Content reviewed  Patient/caregiver encouraged to participate in discharge plan of care prior to discharge home

## 2021-03-08 NOTE — OCCUPATIONAL THERAPY NOTE
Occupational Therapy Evaluation     Patient Name: Osei Hurtado  [id number] Date: 3/8/2021  Problem List  Principal Problem:    Pneumonia due to COVID-19 virus  Active Problems:    Atrial fibrillation with RVR ([address] )    Acute respiratory failure with hypoxia (HCC)    Transaminitis    Past Medical History  History reviewed  No pertinent past medical history  Past Surgical History  History reviewed  No pertinent surgical history  03/08/21 1430   OT Last Visit   OT Visit Date 03/08/21   Note Type   Note type Evaluation   Restrictions/Precautions   Weight Bearing Precautions Per Order No   Other Precautions Contact/isolation; Airborne/isolation;O2   Pain Assessment   Pain Assessment Tool Pain Assessment not indicated - pt denies pain   Home Living   Type of [address] One level;Stairs to enter with rails   Prior Function   Level of [place] Independent with ADLs and functional mobility   Lives With Spouse;Daughter   Receives Help From Family   ADL Assistance Independent   IADLs Independent   Falls in the last 6 months 0   Vocational Retired   [address] and mobility - i iadls - shares homemaking with family    Reciprocal Relationships supportive family    Service to Others retired   Intrinsic Gratification mostly sedentary pta   Subjective   Subjective offers no c/o    ADL   Eating Assistance 7  Independent   Grooming Assistance 5  Supervision/Setup   UB Bathing Assistance 5  Supervision/Setup   LB Bathing Assistance 5  Supervision/Setup   UB Dressing Assistance 5  Supervision/Setup   LB Dressing Assistance 5  [address] 5  Supervision/Setup   Bed Mobility   Supine to [address] to [address] to Sit 5  Supervision   Stand pivot 5  Supervision   Functional Mobility   Functional Mobility 5  Supervision   Balance   Static Sitting Good   Dynamic Sitting Fair + Static Standing Fair   Dynamic Standing Fair   Ambulatory Fair   Activity Tolerance   Activity Tolerance Patient limited by fatigue;Treatment limited secondary to medical complications (Comment)   RUE Assessment   RUE Assessment WFL   LUE Assessment   LUE Assessment WFL   Cognition   Overall Cognitive Status WFL   Assessment   Limitation Decreased endurance;Decreased self-care trans;Decreased high-level ADLs   Prognosis Good   Assessment Pt is a 66 y o  male who was [facility] to [facility] on [facility]/2021 with Pneumonia due to COVID-19 virus   Pt's problem list also includes PMH of afib, pneumonia, transaminitis  At baseline pt was completing adls and mobility independently - I iadls - shares homemaking with family  Pt lives with wife/dtr in 1 story home with 1 RAFAT   Currently pt requires sba for overall ADLS and sba for functional mobility/transfers  Pt currently presents with impairments in the following categories -difficulty performing IADLS  activity tolerance, endurance and standing balance/tolerance  These impairments, as well as pt's fatigue and SOB  limit pt's ability to safely engage in all baseline areas of occupation, includingfunctional mobility/transfers, community mobility, laundry , driving, house maintenance, meal prep, cleaning, social participation  and leisure activities however has supportive family who are able to provide assist prn -  From OT standpoint, recommend home with family support when medically cleared   No further acute OT needs indicated at this time- ok for d/c home when medically cleared- d/c from caseload   Goals   Patient Goals go home    Recommendation   OT Discharge Recommendation Return to previous environment with social support   OT - OK to Discharge Yes   AM-PAC Daily Activity Inpatient   Lower Body Dressing 3   Bathing 3   Toileting 3   Upper Body Dressing 4   Grooming 4   Eating 4   Daily Activity Raw Score 21   Daily Activity Standardized Score (Calc for Raw Score >=11) 44 27   AM-PAC Applied Cognition Inpatient   Following a Speech/Presentation 4   Understanding Ordinary Conversation 4   Taking Medications 4   Remembering Where Things Are Placed or Put Away 4   Remembering List of 4-5 Errands 4   Taking Care of Complicated Tasks 4   Applied Cognition Raw Score 24   Applied Cognition Standardized Score 62 21     The patient's raw score on the AM-PAC Daily Activity inpatient short form is 21, standardized score is 44 27, greater than 39 4  Patients at this level are likely to benefit from discharge to home  Please refer to the recommendation of the Occupational Therapist for safe discharge planning      Jake Lee

## 2021-03-08 NOTE — PLAN OF CARE
Problem: PAIN - ADULT  Goal: Verbalizes/displays adequate comfort level or baseline comfort level  Description: Interventions:  - Encourage patient to monitor pain and request assistance  - Assess pain using appropriate pain scale  - Administer analgesics based on type and severity of pain and evaluate response  - Implement non-pharmacological measures as appropriate and evaluate response  - Consider cultural and social influences on pain and pain management  - Notify physician/advanced practitioner if interventions unsuccessful or patient reports new pain  Outcome: Progressing     Problem: INFECTION - ADULT  Goal: Absence or prevention of progression during hospitalization  Description: INTERVENTIONS:  - Assess and monitor for signs and symptoms of infection  - Monitor lab/diagnostic results  - Monitor all insertion sites, i e  indwelling lines, tubes, and drains  - Monitor endotracheal if appropriate and nasal secretions for changes in amount and color  - Avery appropriate cooling/warming therapies per order  - Administer medications as ordered  - Instruct and encourage patient and family to use good hand hygiene technique  - Identify and instruct in appropriate isolation precautions for identified infection/condition  Outcome: Progressing  Goal: Absence of fever/infection during neutropenic period  Description: INTERVENTIONS:  - Monitor WBC    Outcome: Progressing     Problem: SAFETY ADULT  Goal: Patient will remain free of falls  Description: INTERVENTIONS:  - Assess patient frequently for physical needs  -  Identify cognitive and physical deficits and behaviors that affect risk of falls    -  [place] fall precautions as indicated by assessment   - Educate patient/family on patient safety including physical limitations  - Instruct patient to call for assistance with activity based on assessment  - Modify environment to reduce risk of injury  - Consider OT/PT consult to assist with strengthening/mobility  Outcome: Progressing  Goal: Maintain or return to baseline ADL function  Description: INTERVENTIONS:  -  Assess patient's ability to carry out ADLs; assess patient's baseline for ADL function and identify physical deficits which impact ability to perform ADLs (bathing, care of mouth/teeth, toileting, grooming, dressing, etc )  - Assess/evaluate cause of self-care deficits   - Assess range of motion  - Assess patient's mobility; develop plan if impaired  - Assess patient's need for assistive devices and provide as appropriate  - Encourage maximum independence but intervene and supervise when necessary  - Involve family in performance of ADLs  - Assess for home care needs following discharge   - Consider OT consult to assist with ADL evaluation and planning for discharge  - Provide patient education as appropriate  Outcome: Progressing  Goal: Maintain or return mobility status to optimal level  Description: INTERVENTIONS:  - Assess patient's baseline mobility status (ambulation, transfers, stairs, etc )    - Identify cognitive and physical deficits and behaviors that affect mobility  - Identify mobility aids required to assist with transfers and/or ambulation (gait belt, sit-to-stand, lift, walker, cane, etc )  - [place] fall precautions as indicated by assessment  - Record patient progress and toleration of activity level on Mobility SBAR; progress patient to next Phase/Stage  - Instruct patient to call for assistance with activity based on assessment  - Consider rehabilitation consult to assist with strengthening/weightbearing, etc   Outcome: Progressing     Problem: DISCHARGE PLANNING  Goal: Discharge to home or other facility with appropriate resources  Description: INTERVENTIONS:  - Identify barriers to discharge w/patient and caregiver  - Arrange for needed discharge resources and transportation as appropriate  - Identify discharge learning needs (meds, wound care, etc )  - Arrange for interpretive services to assist at discharge as needed  - Refer to Case Management Department for coordinating discharge planning if the patient needs post-hospital services based on physician/advanced practitioner order or complex needs related to functional status, cognitive ability, or social support system  Outcome: Progressing     Problem: Knowledge Deficit  Goal: Patient/family/caregiver demonstrates understanding of disease process, treatment plan, medications, and discharge instructions  Description: Complete learning assessment and assess knowledge base    Interventions:  - Provide teaching at level of understanding  - Provide teaching via preferred learning methods  Outcome: Progressing

## 2021-03-08 NOTE — PHYSICAL THERAPY NOTE
Physical Therapy Evaluation     Patient's Name: Prince Cisse    Admitting Diagnosis  Atrial fibrillation ([facility] ) [I48 91]  Shortness of breath [R06 02]  Dyspnea [R06 00]  Hypoxia [R09 02]  Elevated d-dimer [R79 89]  COVID-19 [U07 1]    Problem List  Patient Active Problem List   Diagnosis    Atrial fibrillation with RVR ([facility] )    Pneumonia due to COVID-19 virus    [facility] )    Transaminitis       Past Medical History  History reviewed  No pertinent past medical history  Past Surgical History  History reviewed  No pertinent surgical history  03/08/21 1431   PT Last Visit   PT Visit Date 03/08/21   Note Type   Note type Evaluation   Pain Assessment   Pain Assessment Tool 0-10   Pain Score No Pain   Home Living   Type of [address] [place] [address] One level;Stairs to enter with rails  (6 RAFAT )   Home Equipment   (DENIES USE )   Additional Comments PRIOR TO THIS ADMISSION PATIENT RESIDED WITH SPOUSE AND DGHT IN A ONE LEVEL HOME (1 RAFAT) WHERE AT BASELINE HE WAS I WITH MOBILITY (NO AD), ADLS, AND IADLS  Prior Function   Level of [place] Independent with ADLs and functional mobility   Lives With Spouse;Daughter   Receives Help From Family   ADL Assistance Independent   IADLs Independent   Falls in the last 6 months 0   Vocational Retired   Restrictions/Precautions   [place] Bearing Precautions Per Order No   Braces or Orthoses   (NONE)   Other Precautions Fall Risk;Telemetry;O2;Multiple lines; Chair Alarm; Bed Alarm; CONTACT AND AIRBORNE  (CHAIR ALARM ACTIVE POST PT EVAL )   General   Family/Caregiver Present No   Cognition   Arousal/Participation Alert   Orientation Level Oriented to person;Oriented to place;Oriented to situation   Following Commands Follows one step commands with increased time or repetition   Comments PATIENT IS Indonesian SPEAKING  UTILIZED Language Systems PHONE FOR  TRANSLATION      RUE Assessment   RUE Assessment WFL   LUE Assessment   LUE Assessment WFL   RLE Assessment   RLE Assessment WFL   LLE Assessment   LLE Assessment WFL   Bed Mobility   Supine to Sit 5  Supervision   Additional items Increased time required   Sit to Supine Unable to assess   Additional Comments IN CHAIR POST EVAL    Transfers   Sit to Stand 5  Supervision   Stand to Sit 5  Supervision   Ambulation/Elevation   Gait pattern WNL  (REDUCED GAIT SPEED)   Gait Assistance 5  Supervision   Assistive Device None   Distance 20 FEET (WITHIN ROOM)    Balance   Static Sitting Good   Static Standing Fair   Ambulatory Fair   Endurance Deficit   Endurance Deficit Yes   Endurance Deficit Description PATIENT RECEIVED ON 3 L O2  AT REST 90% --> AMBULATING 98%  AT END OF SESSION 95%  Activity Tolerance   Activity Tolerance Patient tolerated treatment well;Patient limited by fatigue   Medical Staff Made Aware CHRISTOPHE HO MOBILITY COORDINATOR TONIA AMOR AND THE RESTORATIVE THERAPISTS TO REQUEST PATIENT BE AMBULATED BY THEM ONE TIME/DAY AS TOLERATED THIS ADMISSION    Nurse Made Aware DANIEL TO SEE PER RN YINKA- F/U POST PT TREAT    Assessment   Prognosis Good   Problem List Decreased endurance   Assessment PT COMPLETED EVALUATION OF 66YEAR OLD MALE AD[facility]ITTED TO [facility] ON 3/7/2021 WHO TESTED + FOR COVID-19 ON 3/1/2021 (BOTH DAUGHTER AND SPOUSE HAVE ALSO) AND PRESENTED WITH WORSENING SOB  CURRENT EMERGING STATUS INCLUDES USE OF SUPPLEMENTAL O2 (3 L), IV FLUIDS, CONTINUOUS O2/HR MONITORING, CONTACT AND AIRBORNE PRECAUTIONS, FALLS RISK, AND BED/CHAIR ALARMS  PRIOR TO THIS ADMISSION PATIENT RESIDED WITH SPOUSE AND DGHT IN A ONE LEVEL HOME ([facility]) WHERE AT BASELINE HE WAS I WITH MOBILITY (NO AD), ADLS, AND IADLS  CURRENT IMPAIRMENTS INCLUDE DECREASED ACTIVITY TOLERANCE, GAIT DEVIATIONS, AND GROSS FATIGUE  DURING PT EVALUATION PATIENT REQUIRED S TO PERFORM BED MOBILITY, SIT<-->STAND TRANSFERS, AND AMBULATION  HE AMBULATED 20 FEET WITHIN ROOM W/O USE OF AD  GAIT SPEED IS REDUCED  NO SIGNIFICANT LOB   ANTICIPATE THIS PATIENT IS FUNCTIONING NEAR HIS INDEPENDENT BASELINE AND HE PRESENTS WITHOUT ACUTE INPT PT NEEDS  RECOMMEND D/C HOME WITH FAMILY ASSIST PRN  WILL MOBILIZE WITH RESTORATIVE THERAPY AND RN STAFF THIS ADMISSION TO PROMOTE MOBILITY  D/C INPT PT  Plan   PT Frequency Other (Comment)  (D/C INPT PT )   Recommendation   PT Discharge Recommendation Return to previous environment with social support   Equipment Recommended   (NONE)   PT - OK to Discharge Yes  ([address] [employer] DANIEL )   AM-PAC Basic Mobility Inpatient   Turning in Bed Without Bedrails 4   Lying on Back to Sitting on Edge of Flat Bed 4   Moving Bed to Chair 4   Standing Up From Chair 4   Walk in Room 4   Climb 3-5 Stairs 3   Basic Mobility Inpatient Raw Score 23   Basic Mobility Standardized Score 50 88     The patient's AM-PAC Basic Mobility Inpatient Short Form Raw Score is 23, Standardized Score is 50  88  A standardized score greater than 42 9 suggests the patient may benefit from discharge to home  Please also refer to the recommendation of the Physical Therapist for safe discharge planning            Nelly Stage, PT, DPT

## 2021-03-08 NOTE — ASSESSMENT & PLAN NOTE
Patient with approximately two weeks of shortness of breath, fevers, chills, myalgias, diarrhea, poor PO intake, fatigue  Tested positive for COVID on 3/1/21 but had been having symptoms before that  Diarrhea has resolved  Per daughter, patient appeared more confused today  Patient satting 88% on room air on arrival to ED with improvement to 96% on 4L nasal cannula  - Labs: AST 77, ALT 79, albumin 2 6, lactic acid 2 5, procalcitonin 1 41, d-dimer 4 58, blood cultures pending, procal 1 41  -D-dimer trending downward, BNP elevated at 2900, no prior baseline  - Chest x-ray in the ED showing diffuse patchy infiltrates  - CT head showing acute pansinusitis, patient asymptomatic  - CTA showed no evidence of pulmonary emboli but with extensive ground-glass infiltrates related to COVID pneumonia and trace pleural effusions    - no smoking or drug history    Plan:   - COVID moderate pathway  - continue remdesivir, Decadron, ceftriaxone, doxycycline  - trend inflammatory markers  - f/u blood cultures  - f/u chronic hepatitis panel  - anticoagulation with heparin drip  - wean O2 as tolerated  - aggressive pulmonary hygeine

## 2021-03-08 NOTE — ASSESSMENT & PLAN NOTE
Due to COVID pneumonia  Appears euvolemic on exam  BNP however was elevated at 2900, no prior baseline  Echocardiogram ordered and pending  Continue to wean oxygen

## 2021-03-08 NOTE — H&P
INTERNAL MEDICINE RESIDENCY ADMISSION H&P     Name: Carlos Heredia   Age & Sex: 66 y o  male   MRN: [id number]  Unit/Bed#: ED 24   Encounter: [id number]  Primary Care Provider: No primary care provider on file  Code Status: Level 1 - Full Code  Admission Status: INPATIENT   Disposition: Patient requires Med/Surg with Telemetry    Admit to team: SOD Team B     ASSESSMENT/PLAN     Principal Problem:    Atrial fibrillation with RVR ([address] )  Active Problems:    Pneumonia due to COVID-19 virus      Atrial fibrillation with RVR [facility])  Assessment & Plan  Patient with new onset AFib 1st noted in the ED with heart rates in 130s  Last EKG at [facility] showing right bundle branch block 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  Patient is not on anticoagulation at home although he was previously taking aspirin daily but stopped due to fatigue  CHADS-VASc 2 due to patient age  Although patient was hypertensive in the ED, review of prior records showsnormal blood pressures at previous office visits  Patient also does not have a documented history of CAD and never underwent catheterization  S/p cardizem drip in the ED without improvement in heart rates  Denies chest pain, orthopnea, leg swelling    - TSH within normal limits  - CTA no evidence of PE  - Possibly due to acute COVID infection vs prior MI  Plan:  - continue cardizem drip for now  - start heparin ACS low  - IV lopressor 5mg once, will reassess   - closely monitor HR and blood pressures  - patient will need to be started on anticoagulation prior to discharge    * Pneumonia due to COVID-19 virus  Assessment & Plan  Patient with approximately two weeks of shortness of breath, fevers, chills, myalgias, diarrhea, poor PO intake, fatigue  Tested positive for COVID on 3/1/21 but had been having symptoms before that  Diarrhea has resolved  Per daughter, patient appeared more confused today   Patient satting 88% on room air on arrival to ED with improvement to 96% on 4L nasal cannula  - Labs: AST 77, ALT 79, albumin 2 6, lactic acid 2 5, procalcitonin 1 41, d-dimer 4 58, blood cultures pending, procal 1 41  - Chest x-ray in the ED showing diffuse patchy infiltrates  - CT head showing acute pansinusitis  - CTA showed no evidence of pulmonary emboli but with extensive ground-glass infiltrates related to COVID pneumonia and trace pleural effusions  - no smoking or drug history    Plan:   - COVID moderate pathway  - f/u AM inflammatory markers  - f/u blood cultures  - f/u chronic hepatitis panel  - anticoagulation with heparin drip  - wean O2 as tolerated  - aggressive pulmonary hygeine      VTE Pharmacologic Prophylaxis: Heparin  VTE Mechanical Prophylaxis: sequential compression device    CHIEF COMPLAINT     Chief Complaint   Patient presents with    Shortness of Breath     Pt tested positive for covid on 3/1  Pt states yesterday his sx's started getting worse  Pt having more severe sob  Per daughter, when she checked his O2 level this afternoon it was 88% on room air and pt's pcp told her to bring him to the ED  HISTORY OF PRESENT ILLNESS     HPI obtained mostly through daughter and the patient  Patient is Latvian speaking only  Patient is a 64-year-old male with no known past medical history who presents for approximately 2 weeks of increasing shortness of breath  Patient did test positive for COVID on 03/01/2021 and had been having symptoms of fevers, chills, diarrhea, myalgias for approximately 1 week prior to that  Daughter states that she visited her father today in his home and he seemed a little bit more confused than usual and was having shortness of breath and was in bed all day  He also endorses several days of poor p o  intake and feeling generally fatigued  Fevers and diarrhea have both resolved    Patient was noted to be in AFib with RVR on arrival to the ED with heart rates greater than 110, however patient denies chest pain or any other cardiac symptoms  He was started on a Cardizem drip in the ED with heart rates continuously being in 140s while on the drip  Patient was also tachypneic with respiratory rate 40, however blood pressure stable in 388V to 974R systolic  Patient satting 96% on room air  Of note, patient does follow with a PCP and was previously noted to have right bundle branch block and 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 on EKG  He was prescribed aspirin by cardiologist at [facility], however stopped taking it 2 months ago due to feeling fatigued  Patient denies symptoms of heart failure such as orthopnea and leg swelling  Patient does not smoke, does drink alcohol socially, does not use any recreational drugs  He lives at home with his wife  Labs in the ED significant for AST 77, ALT 79, albumin 2 6, lactic acid 2 5, procalcitonin 1 41, d-dimer 4 58, TSh within normal limits, blood cultures were drawn  Procal 1 41  Chest x-ray in the ED showing diffuse patchy infiltrates, CT head showing acute pansinusitis, CTA showed no evidence of pulmonary emboli but with extensive ground-glass infiltrates related to COVID pneumonia and trace pleural effusions  REVIEW OF SYSTEMS     Review of Systems   Constitutional: Positive for fatigue  HENT: Negative  Eyes: Negative  Respiratory: Positive for shortness of breath  Negative for chest tightness and wheezing  Cardiovascular: Negative for chest pain, palpitations and leg swelling  Gastrointestinal: Negative  Endocrine: Negative  Genitourinary: Negative  Musculoskeletal: Positive for myalgias  Neurological: Negative  Hematological: Negative  Psychiatric/Behavioral: Negative        OBJECTIVE     Vitals:    21 1750 21 1953   BP: 149/67    BP Location: Right arm    Pulse: (!) 130    Resp: (!) 40    Temp: 99 3 °F (37 4 °C)    TempSrc: Oral    SpO2: 93%    Weight:  67 kg (147 lb 11 3 oz)      Temperature:   Temp (24hrs), Av 3 °F (37 4 °C), Min:99 3 °F (37 4 °C), Max:99 3 °F (37 4 °C)    Temperature: 99 3 °F (37 4 °C)  Intake & Output:  I/O       03/06 0701 - 03/07 0700 03/07 0701 - 03/08 0700    IV Piggyback  550    Total Intake(mL/kg)  550 (8 2)    Net  +550              Weights:   IBW: -88 kg    There is no height or weight on file to calculate BMI  Weight (last 2 days)     Date/Time   Weight    03/07/21 1953   67 (147 71)            Physical Exam  Vitals signs and nursing note reviewed  Constitutional:       General: He is not in acute distress  Appearance: He is well-developed  He is not ill-appearing or toxic-appearing  HENT:      Head: Normocephalic and atraumatic  Eyes:      General: No scleral icterus  Extraocular Movements: Extraocular movements intact  Conjunctiva/sclera: Conjunctivae normal    Neck:      Musculoskeletal: Neck supple  Cardiovascular:      Rate and Rhythm: Tachycardia present  Rhythm irregular  Pulses: Normal pulses  Heart sounds: No murmur  Pulmonary:      Effort: Pulmonary effort is normal  No respiratory distress  Comments: 4L nasal cannula  Diffusely decreased breath sounds throughout  Abdominal:      General: Bowel sounds are normal  There is no distension  Palpations: Abdomen is soft  Tenderness: There is no abdominal tenderness  Musculoskeletal:      Right lower leg: No edema  Left lower leg: No edema  Skin:     General: Skin is warm and dry  Capillary Refill: Capillary refill takes less than 2 seconds  Neurological:      General: No focal deficit present  Mental Status: He is alert and oriented to person, place, and time  Psychiatric:         Mood and Affect: Mood normal          Behavior: Behavior normal        PAST MEDICAL HISTORY   History reviewed  No pertinent past medical history  PAST SURGICAL HISTORY   History reviewed  No pertinent surgical history    SOCIAL & FAMILY HISTORY     Social History     Substance and Sexual Activity   Alcohol Use Not Currently Social History     Substance and Sexual Activity   Drug Use Never     Social History     Tobacco Use   Smoking Status Never Smoker   Smokeless Tobacco Never Used     History reviewed  No pertinent family history  LABORATORY DATA     Labs: I have personally reviewed pertinent reports  Results from last 7 days   Lab Units 03/07/21  1810   WBC Thousand/uL 9 48   HEMOGLOBIN g/dL 12 1   HEMATOCRIT % 38 8   PLATELETS Thousands/uL 264   MONO PCT % 1*      Results from last 7 days   Lab Units 03/07/21  1810   POTASSIUM mmol/L 4 6   CHLORIDE mmol/L 107   CO2 mmol/L 26   BUN mg/dL 24   CREATININE mg/dL 1 15   CALCIUM mg/dL 8 3   ALK PHOS U/L 58   ALT U/L 79*   AST U/L 77*     Results from last 7 days   Lab Units 03/07/21  1810   MAGNESIUM mg/dL 2 4          Results from last 7 days   Lab Units 03/07/21  1810   INR  1 23*   PTT seconds 32     Results from last 7 days   Lab Units 03/07/21  1810   LACTIC ACID mmol/L 2 5*     Results from last 7 days   Lab Units 03/07/21  1814   TROPONIN I ng/mL <0 02     Micro:  No results found for: Almon Kipper, WOUNDCULT, SPUTUMCULTUR  IMAGING & DIAGNOSTIC TESTS     Imaging: I have personally reviewed pertinent reports  Ct Head Without Contrast    Result Date: 3/7/2021  Impression: Acute pansinusitis  No sign of an acute transcortical infarction  No acute intracranial hemorrhage  Workstation performed: [id number]     Cta Ed Chest Pe Study    Result Date: 3/7/2021  Impression: No evidence of pulmonary emboli  Extensive groundglass infiltrates related to Covid pneumonia  Trace pleural effusions  Mild mediastinal and hilar adenopathy Workstation performed: [id number]     EKG, Pathology, and Other Studies: I have personally reviewed pertinent reports       ALLERGIES   No Known Allergies  MEDICATIONS PRIOR TO ARRIVAL     Prior to Admission medications    Not on File     MEDICATIONS ADMINISTERED IN LAST 24 HOURS     Medication Administration - last 24 hours from 03/06/2021 2120 to 03/07/2021 2120       Date/Time Order Dose Route Action Action by     03/07/2021 1945 ceftriaxone (ROCEPHIN) 1 g/50 mL in dextrose IVPB 0 mg Intravenous Stopped Javonsondra Perezs     03/07/2021 1859 ceftriaxone (ROCEPHIN) 1 g/50 mL in dextrose IVPB 1,000 mg Intravenous New Bag [facility]     03/07/2021 1849 doxycycline hyclate (VIBRAMYCIN) capsule 100 mg 100 mg Oral Given [facility]     03/07/2021 2011 sodium chloride 0 9 % bolus 500 mL 0 mL Intravenous Stopped [facility]     03/07/2021 1858 sodium chloride 0 9 % bolus 500 mL 500 mL Intravenous New Bag [facility]     03/07/2021 1924 iohexol (OMNIPAQUE) 350 MG/ML injection (MULTI-DOSE) 85 mL 85 mL Intravenous Given Jennifer Garcia     03/07/2021 2039 diltiazem (CARDIZEM) 125 mg in sodium chloride 0 9 % 125 mL infusion 5 mg/hr Intravenous New Bag Estefani Chappell RN        CURRENT MEDICATIONS     Current Facility-Administered Medications   Medication Dose Route Frequency Provider Last Rate    acetaminophen  650 mg Oral Q6H PRN Rehana Jerry MD      ascorbic acid  1,000 mg Oral Q12H Lenny Mendez MD      atorvastatin  40 mg Oral HS Rehana Jerry MD      cefTRIAXone  1,000 mg Intravenous Q24H Rehana Jerry MD Stopped (03/07/21 1945)    cefTRIAXone  1,000 mg Intravenous Q24H Rehana Jerry MD     [address] [place] [address] [START ON 3/8/2021] cholecalciferol  2,000 Units Oral Daily Rehana Jerry MD      dexamethasone  6 mg Intravenous Q24H Rehana Jerry MD      docusate sodium  100 mg Oral BID Rehana Jerry MD      doxycycline hyclate  100 mg Oral Q12H Rehana Jerry MD      doxycycline hyclate  100 mg Oral Q12H Rehana Jerry MD      famotidine  20 mg Oral BID Rehana Jerry MD      heparin   Intravenous Once Rehana Jerry MD      metoprolol  5 mg Intravenous Once Rehana Jerry MD      [START ON 3/8/2021] zinc sulfate  220 mg Oral Daily Rehana Jerry MD      Followed by   Jhonatan Matos ON 3/15/2021] multivitamin-minerals  1 tablet Oral Daily Rehana Jerry MD      ondansetron  4 mg Intravenous Q6H PRN Rehana Jerry MD      remdesivir  200 mg Intravenous Q24H Rehana Jerry MD      Followed by   Jhonatan Matos ON 3/8/2021] remdesivir  100 mg Intravenous Q24H Rehana Jerry MD     Aur[facility] Amezquita [START ON 3/8/2021] senna  1 tablet Oral Daily Rehana Jerry MD          acetaminophen, 650 mg, Q6H PRN  ondansetron, 4 mg, Q6H PRN        Admission Time  I spent 30 minutes admitting the patient  This involved direct patient contact where I performed a full history and physical, reviewing previous records, and reviewing laboratory and other diagnostic studies  Portions of the record may have been created with voice recognition software  Occasional wrong word or "sound a like" substitutions may have occurred due to the inherent limitations of voice recognition software    Read the chart carefully and recognize, using context, where substitutions have occurred     ==  Rehana Jerry MD  [address]  Internal Medicine Residency PGY-1

## 2021-03-08 NOTE — UTILIZATION REVIEW
Notification of Inpatient Admission/Inpatient Authorization Request   This is a Notification of Inpatient Admission for [address]  Be advised that this patient was admitted to our facility under Inpatient Status  Contact Pamela Haines at [phone] for additional admission information  [place] [facility]  DEPT  DEDICATED -[phone]  Patient Name:   Baljit Bey   YOB: 1945       [address]   [address]:   [place]  Tax ID: [id number]  NPI: [id number] Attending Provider/NPI:  Phone:  Address: Ekaterina Nelson [[id number]]   [phone]  Same as [place] of Service Code: 24 Place of Service Name:  [address] [place] [address]   Start Date: 3/7/21 2025 Discharge Date & Time: No discharge date for patient encounter  Type of Admission: Inpatient Status Discharge Disposition (if discharged): Final discharge disposition not confirmed   Patient Diagnoses: Atrial fibrillation ([address] ) [I48 91]  Shortness of breath [R06 02]  Dyspnea [R06 00]  Hypoxia [R09 02]  Elevated d-dimer [R79 89]  COVID-19 [U07 1]     Orders: Admission Orders (From admission, onward)     Ordered        03/07/21 2025  Inpatient Admission  Once                    Assigned Utilization Review Contact: Pamela Haines  Utilization   Network Utilization Review Department  Phone: [phone]; Fax [phone]  Email: [email or web address]   ATTENTION PAYERS: Please call the assigned Utilization  directly with any questions or concerns ALL voicemails in the department are confidential  Send all requests for admission clinical reviews, approved or denied determinations and any other requests to dedicated fax number belonging to the campus where the patient is receiving treatment

## 2021-03-08 NOTE — ED NOTES
Report called to Dwaine Campos on MS2 - awaiting telebox #[phone] to be taken up with pt at time of transport  RN to accompany to floor        Suresh Moran, KEV  03/07/21 2059

## 2021-03-08 NOTE — ASSESSMENT & PLAN NOTE
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  Patient is not on anticoagulation at home although he was previously taking aspirin daily but stopped due to fatigue  CHADS-VASc 2 due to patient age  Although patient was hypertensive in the ED, review of prior records showsnormal blood pressures at previous office visits  Patient also does not have a documented history of CAD and never underwent catheterization  S/p cardizem drip in the ED without improvement in heart rates  Denies chest pain, orthopnea, leg swelling    - TSH within normal limits  - CTA no evidence of PE  - Possibly due to acute COVID infection vs prior MI       Plan:  - switch patient from Cardizem to low-dose Lopressor 12 5 b i d   - blood pressures soft, increasing IV fluid hydration  - if patient has sustained AFib with RVR will consider starting patient on amiodarone given his hypotension  - continue heparin driP, price check for Xarelto given to   - closely monitor HR and blood pressures on telemetry

## 2021-03-08 NOTE — PLAN OF CARE
Problem: PAIN - ADULT  Goal: Verbalizes/displays adequate comfort level or baseline comfort level  Description: Interventions:  - Encourage patient to monitor pain and request assistance  - Assess pain using appropriate pain scale  - Administer analgesics based on type and severity of pain and evaluate response  - Implement non-pharmacological measures as appropriate and evaluate response  - Consider cultural and social influences on pain and pain management  - Notify physician/advanced practitioner if interventions unsuccessful or patient reports new pain  Outcome: Progressing     Problem: INFECTION - ADULT  Goal: Absence or prevention of progression during hospitalization  Description: INTERVENTIONS:  - Assess and monitor for signs and symptoms of infection  - Monitor lab/diagnostic results  - Monitor all insertion sites, i e  indwelling lines, tubes, and drains  - Monitor endotracheal if appropriate and nasal secretions for changes in amount and color  - [place] appropriate cooling/warming therapies per order  - Administer medications as ordered  - Instruct and encourage patient and family to use good hand hygiene technique  - Identify and instruct in appropriate isolation precautions for identified infection/condition  Outcome: Progressing  Goal: Absence of fever/infection during neutropenic period  Description: INTERVENTIONS:  - Monitor WBC    Outcome: Progressing     Problem: SAFETY ADULT  Goal: Patient will remain free of falls  Description: INTERVENTIONS:  - Assess patient frequently for physical needs  -  Identify cognitive and physical deficits and behaviors that affect risk of falls    -  [place] fall precautions as indicated by assessment   - Educate patient/family on patient safety including physical limitations  - Instruct patient to call for assistance with activity based on assessment  - Modify environment to reduce risk of injury  - Consider OT/PT consult to assist with strengthening/mobility  Outcome: Progressing  Goal: Maintain or return to baseline ADL function  Description: INTERVENTIONS:  -  Assess patient's ability to carry out ADLs; assess patient's baseline for ADL function and identify physical deficits which impact ability to perform ADLs (bathing, care of mouth/teeth, toileting, grooming, dressing, etc )  - Assess/evaluate cause of self-care deficits   - Assess range of motion  - Assess patient's mobility; develop plan if impaired  - Assess patient's need for assistive devices and provide as appropriate  - Encourage maximum independence but intervene and supervise when necessary  - Involve family in performance of ADLs  - Assess for home care needs following discharge   - Consider OT consult to assist with ADL evaluation and planning for discharge  - Provide patient education as appropriate  Outcome: Progressing  Goal: Maintain or return mobility status to optimal level  Description: INTERVENTIONS:  - Assess patient's baseline mobility status (ambulation, transfers, stairs, etc )    - Identify cognitive and physical deficits and behaviors that affect mobility  - Identify mobility aids required to assist with transfers and/or ambulation (gait belt, sit-to-stand, lift, walker, cane, etc )  - [place] fall precautions as indicated by assessment  - Record patient progress and toleration of activity level on Mobility SBAR; progress patient to next Phase/Stage  - Instruct patient to call for assistance with activity based on assessment  - Consider rehabilitation consult to assist with strengthening/weightbearing, etc   Outcome: Progressing     Problem: DISCHARGE PLANNING  Goal: Discharge to home or other facility with appropriate resources  Description: INTERVENTIONS:  - Identify barriers to discharge w/patient and caregiver  - Arrange for needed discharge resources and transportation as appropriate  - Identify discharge learning needs (meds, wound care, etc )  - Arrange for interpretive services to assist at discharge as needed  - Refer to Case Management Department for coordinating discharge planning if the patient needs post-hospital services based on physician/advanced practitioner order or complex needs related to functional status, cognitive ability, or social support system  Outcome: Progressing     Problem: Knowledge Deficit  Goal: Patient/family/caregiver demonstrates understanding of disease process, treatment plan, medications, and discharge instructions  Description: Complete learning assessment and assess knowledge base  Interventions:  - Provide teaching at level of understanding  - Provide teaching via preferred learning methods  Outcome: Progressing     Problem: Potential for Falls  Goal: Patient will remain free of falls  Description: INTERVENTIONS:  - Assess patient frequently for physical needs  -  Identify cognitive and physical deficits and behaviors that affect risk of falls    -  [place] fall precautions as indicated by assessment   - Educate patient/family on patient safety including physical limitations  - Instruct patient to call for assistance with activity based on assessment  - Modify environment to reduce risk of injury  - Consider OT/PT consult to assist with strengthening/mobility  Outcome: Progressing

## 2021-03-09 PROBLEM — E87.2 LACTIC ACIDOSIS: Status: ACTIVE | Noted: 2021-03-09

## 2021-03-09 PROBLEM — A41.9 SEPSIS (HCC): Status: ACTIVE | Noted: 2021-03-09

## 2021-03-09 LAB
ALBUMIN SERPL BCP-MCNC: 2.3 G/DL (ref 3.5–5)
ALP SERPL-CCNC: 55 U/L (ref 46–116)
ALT SERPL W P-5'-P-CCNC: 81 U/L (ref 12–78)
ANION GAP SERPL CALCULATED.3IONS-SCNC: 7 MMOL/L (ref 4–13)
APTT PPP: 58 SECONDS (ref 23–37)
APTT PPP: 79 SECONDS (ref 23–37)
AST SERPL W P-5'-P-CCNC: 76 U/L (ref 5–45)
BILIRUB SERPL-MCNC: 0.64 MG/DL (ref 0.2–1)
BUN SERPL-MCNC: 20 MG/DL (ref 5–25)
CALCIUM ALBUM COR SERPL-MCNC: 9 MG/DL (ref 8.3–10.1)
CALCIUM SERPL-MCNC: 7.6 MG/DL (ref 8.3–10.1)
CHLORIDE SERPL-SCNC: 109 MMOL/L (ref 100–108)
CO2 SERPL-SCNC: 25 MMOL/L (ref 21–32)
CREAT SERPL-MCNC: 0.69 MG/DL (ref 0.6–1.3)
ERYTHROCYTE [DISTWIDTH] IN BLOOD BY AUTOMATED COUNT: 16.3 % (ref 11.6–15.1)
GFR SERPL CREATININE-BSD FRML MDRD: 92 ML/MIN/1.73SQ M
GLUCOSE SERPL-MCNC: 130 MG/DL (ref 65–140)
HCT VFR BLD AUTO: 33.2 % (ref 36.5–49.3)
HGB BLD-MCNC: 10.4 G/DL (ref 12–17)
LACTATE SERPL-SCNC: 2.5 MMOL/L (ref 0.5–2)
LACTATE SERPL-SCNC: 2.6 MMOL/L (ref 0.5–2)
MCH RBC QN AUTO: 25.1 PG (ref 26.8–34.3)
MCHC RBC AUTO-ENTMCNC: 31.3 G/DL (ref 31.4–37.4)
MCV RBC AUTO: 80 FL (ref 82–98)
PLATELET # BLD AUTO: 307 THOUSANDS/UL (ref 149–390)
PMV BLD AUTO: 11.8 FL (ref 8.9–12.7)
POTASSIUM SERPL-SCNC: 3.8 MMOL/L (ref 3.5–5.3)
PROCALCITONIN SERPL-MCNC: 0.91 NG/ML
PROT SERPL-MCNC: 5.9 G/DL (ref 6.4–8.2)
RBC # BLD AUTO: 4.14 MILLION/UL (ref 3.88–5.62)
SODIUM SERPL-SCNC: 141 MMOL/L (ref 136–145)
WBC # BLD AUTO: 12.1 THOUSAND/UL (ref 4.31–10.16)

## 2021-03-09 PROCEDURE — 83605 ASSAY OF LACTIC ACID: CPT | Performed by: STUDENT IN AN ORGANIZED HEALTH CARE EDUCATION/TRAINING PROGRAM

## 2021-03-09 PROCEDURE — 80053 COMPREHEN METABOLIC PANEL: CPT | Performed by: STUDENT IN AN ORGANIZED HEALTH CARE EDUCATION/TRAINING PROGRAM

## 2021-03-09 PROCEDURE — 85730 THROMBOPLASTIN TIME PARTIAL: CPT | Performed by: INTERNAL MEDICINE

## 2021-03-09 PROCEDURE — 85027 COMPLETE CBC AUTOMATED: CPT | Performed by: STUDENT IN AN ORGANIZED HEALTH CARE EDUCATION/TRAINING PROGRAM

## 2021-03-09 PROCEDURE — 84145 PROCALCITONIN (PCT): CPT | Performed by: STUDENT IN AN ORGANIZED HEALTH CARE EDUCATION/TRAINING PROGRAM

## 2021-03-09 PROCEDURE — 99222 1ST HOSP IP/OBS MODERATE 55: CPT | Performed by: INTERNAL MEDICINE

## 2021-03-09 RX ORDER — DIGOXIN 125 MCG
125 TABLET ORAL DAILY
Status: DISCONTINUED | OUTPATIENT
Start: 2021-03-10 | End: 2021-03-16 | Stop reason: HOSPADM

## 2021-03-09 RX ORDER — DIGOXIN 0.25 MG/ML
250 INJECTION INTRAMUSCULAR; INTRAVENOUS EVERY 4 HOURS
Status: COMPLETED | OUTPATIENT
Start: 2021-03-09 | End: 2021-03-10

## 2021-03-09 RX ORDER — LANOLIN ALCOHOL/MO/W.PET/CERES
3 CREAM (GRAM) TOPICAL
Status: DISCONTINUED | OUTPATIENT
Start: 2021-03-09 | End: 2021-03-16 | Stop reason: HOSPADM

## 2021-03-09 RX ADMIN — FAMOTIDINE 20 MG: 20 TABLET ORAL at 20:00

## 2021-03-09 RX ADMIN — ZINC SULFATE 220 MG (50 MG) CAPSULE 220 MG: CAPSULE at 09:04

## 2021-03-09 RX ADMIN — DEXAMETHASONE SODIUM PHOSPHATE 6 MG: 4 INJECTION INTRA-ARTICULAR; INTRALESIONAL; INTRAMUSCULAR; INTRAVENOUS; SOFT TISSUE at 21:07

## 2021-03-09 RX ADMIN — REMDESIVIR 100 MG: 100 INJECTION, POWDER, LYOPHILIZED, FOR SOLUTION INTRAVENOUS at 00:37

## 2021-03-09 RX ADMIN — OXYCODONE HYDROCHLORIDE AND ACETAMINOPHEN 1000 MG: 500 TABLET ORAL at 09:04

## 2021-03-09 RX ADMIN — DIGOXIN 250 MCG: 0.25 INJECTION INTRAMUSCULAR; INTRAVENOUS at 16:30

## 2021-03-09 RX ADMIN — RIVAROXABAN 20 MG: 20 TABLET, FILM COATED ORAL at 14:04

## 2021-03-09 RX ADMIN — DIGOXIN 250 MCG: 0.25 INJECTION INTRAMUSCULAR; INTRAVENOUS at 12:02

## 2021-03-09 RX ADMIN — DOXYCYCLINE 100 MG: 100 CAPSULE ORAL at 20:00

## 2021-03-09 RX ADMIN — Medication 2000 UNITS: at 09:04

## 2021-03-09 RX ADMIN — DIGOXIN 250 MCG: 0.25 INJECTION INTRAMUSCULAR; INTRAVENOUS at 20:05

## 2021-03-09 RX ADMIN — DOCUSATE SODIUM 100 MG: 100 CAPSULE, LIQUID FILLED ORAL at 09:04

## 2021-03-09 RX ADMIN — FAMOTIDINE 20 MG: 20 TABLET ORAL at 09:04

## 2021-03-09 RX ADMIN — OXYCODONE HYDROCHLORIDE AND ACETAMINOPHEN 1000 MG: 500 TABLET ORAL at 20:13

## 2021-03-09 RX ADMIN — CEFTRIAXONE 1000 MG: 2 INJECTION, POWDER, FOR SOLUTION INTRAMUSCULAR; INTRAVENOUS at 20:00

## 2021-03-09 RX ADMIN — MELATONIN 3 MG: at 20:13

## 2021-03-09 RX ADMIN — DOXYCYCLINE 100 MG: 100 CAPSULE ORAL at 06:00

## 2021-03-09 RX ADMIN — Medication 12.5 MG: at 09:12

## 2021-03-09 RX ADMIN — HEPARIN SODIUM 1950 UNITS: 1000 INJECTION INTRAVENOUS; SUBCUTANEOUS at 04:27

## 2021-03-09 RX ADMIN — ACETAMINOPHEN 650 MG: 325 TABLET ORAL at 02:45

## 2021-03-09 RX ADMIN — ATORVASTATIN CALCIUM 40 MG: 40 TABLET, FILM COATED ORAL at 21:07

## 2021-03-09 RX ADMIN — Medication 12.5 MG: at 16:30

## 2021-03-09 NOTE — PROGRESS NOTES
INTERNAL MEDICINE RESIDENCY PROGRESS NOTE     Name: Bradley Schaeffer   Age & Sex: 66 y o  male   MRN: [id number]  Unit/Bed#: -01   Encounter: [id number]  Team: SOD Team B     PATIENT INFORMATION     Name: Bradley Schaeffer   Age & Sex: 66 y o  male   MRN: [id number]  Hospital Stay Days: 2    ASSESSMENT/PLAN     Principal Problem:    Pneumonia due to COVID-19 virus  Active Problems:    Atrial fibrillation with RVR ([address] )    Acute respiratory failure with hypoxia (HCC)    Transaminitis    Lactic acidosis      Lactic acidosis  Assessment & Plan  Down trending in the setting of sepsis secondary to COVID-19 pneumonia  Currently 2 5  Has received over 2 L of IV resuscitation fluids  Other possible etiologies include heart failure though he appears euvolemic on exam   Echocardiogram is pending  Will trend lactic acid daily for now in order to minimize blood draws  Continue to monitor patient clinically    Transaminitis  Assessment & Plan  AST ALT 70s  Likely due to recent infection  Chronic hepatitis panel negative  Will continue to monitor  Continue to trend LFTs    Acute respiratory failure with hypoxia ([address] )  Assessment & Plan  Due to COVID pneumonia  Appears euvolemic on exam  BNP however was elevated at 2900, no prior baseline  Echocardiogram ordered and pending  Continue to wean oxygen, currently on 3 L      Atrial fibrillation with RVR ([address] )  Assessment & Plan  Patient with new onset AFib 1st noted in the ED with heart rates in 130s  Last EKG at [facility] showing right bundle branch block 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  Patient is not on anticoagulation at home although he was previously taking aspirin daily but stopped due to fatigue  CHADS-VASc 2 due to patient age  Although patient was hypertensive in the ED, review of prior records showsnormal blood pressures at previous office visits  Patient also does not have a documented history of CAD and never underwent catheterization   S/p cardizem drip in the ED without improvement in heart rates  Denies chest pain, orthopnea, leg swelling    - TSH within normal limits  - CTA no evidence of PE  - Possibly due to acute COVID infection vs prior MI  Plan:  - continue Lopressor 12 5 b i d , patient has not been receiving due to low blood pressures  Rates today are not controlled, ranging from 130-160  Considering amiodarone bolus and drip as his blood pressures are not able to tolerate increased doses of Lopressor, however will consult Cardiology for further recommendations, appreciated  - if patient has sustained AFib with RVR will consider starting patient on amiodarone given his hypotension  - continue heparin drip, price check for Xarelto given to , will follow-up and switch patient if he is able to afford medication  - closely monitor HR and blood pressures on telemetry    * Pneumonia due to COVID-19 virus  Assessment & Plan  Patient with approximately two weeks of shortness of breath, fevers, chills, myalgias, diarrhea, poor PO intake, fatigue  Tested positive for COVID on 3/1/21 but had been having symptoms before that  Diarrhea has resolved  Per daughter, patient appeared more confused today  Patient satting 88% on room air on arrival to ED with improvement to 96% on 4L nasal cannula  - Labs: AST 77, ALT 79, albumin 2 6, lactic acid 2 5, procalcitonin 1 41, d-dimer 4 58, blood cultures pending, procal 1 41  -D-dimer trending downward, BNP elevated at 2900, no prior baseline  - Chest x-ray in the ED showing diffuse patchy infiltrates  - CT head showing acute pansinusitis, patient asymptomatic  - CTA showed no evidence of pulmonary emboli but with extensive ground-glass infiltrates related to COVID pneumonia and trace pleural effusions    - no smoking or drug history    Plan:   - COVID moderate pathway  - continue remdesivir, Decadron, ceftriaxone, doxycycline, day 3  - trend inflammatory markers  - blood cultures negative for 24 hours  - chronic hepatitis panel negative  - anticoagulation with heparin drip  - wean O2 as tolerated, currently on 3 L  - aggressive pulmonary hygeine  - continue to monitor on telemetry        Disposition:  Currently on 3 L nasal cannula, on the COVID moderate pathway  Anticipate discharge later this week if stable on room air  SUBJECTIVE     Patient seen and examined  No acute events overnight  Patient states he had some concerns over his antibiotics that he is receiving  States he states that it makes him feel overly fatigued  Counseled patient that this is likely due to COVID-19 pneumonia infection and that he should continue taking antibiotics in case there is a superimposed bacterial infection  He otherwise denies any other symptoms including chest pain, shortness of breath, nausea, vomiting  Tolerating p o  Diet  Rest of ROS was negative  OBJECTIVE     Vitals:    21 0419 21 0729 21 0911 21 1107   BP:  107/71 111/93 102/68   BP Location:  Left arm     Pulse: 80 (!) 111 63 89   Resp:  18     Temp: (!) 97 2 °F (36 2 °C) 97 6 °F (36 4 °C)     TempSrc:  Axillary     SpO2: 93% 91% 93% 94%   Weight:       Height:          Temperature:   Temp (24hrs), Av 7 °F (36 5 °C), Min:97 2 °F (36 2 °C), Max:98 1 °F (36 7 °C)    Temperature: 97 6 °F (36 4 °C)  Intake & Output:  I/O        0701 -  0700 / 07 -  0700 / 0701 - 03/10 0700    P  O   480     I V  (mL/kg)  2460 (36 7) 3750 (56)    IV Piggyback 550      Total Intake(mL/kg) 550 (8 2) 2940 (43 9) 3750 (56)    Urine (mL/kg/hr)  1300 (0 8)     Stool  0     Total Output  1300     Net +550 +1640 +3750           Unmeasured Stool Occurrence  1 x         Weights:   IBW: 68 4 kg    Body mass index is 22 46 kg/m²  Weight (last 2 days)     Date/Time   Weight    21 0300   67 (147 71)    21 1953   67 (147 71)            Physical Exam  Constitutional:       General: He is not in acute distress       Appearance: Normal appearance  He is ill-appearing  Interventions: Nasal cannula in place  HENT:      Head: Normocephalic and atraumatic  Mouth/Throat:      Mouth: Mucous membranes are moist    Eyes:      Extraocular Movements: Extraocular movements intact  Conjunctiva/sclera: Conjunctivae normal       Pupils: Pupils are equal, round, and reactive to light  Cardiovascular:      Rate and Rhythm: Tachycardia present  Rhythm irregular  Pulses: Normal pulses  Heart sounds: Normal heart sounds  No murmur  No friction rub  No gallop  Pulmonary:      Effort: Pulmonary effort is normal  No respiratory distress  Breath sounds: Normal breath sounds  No wheezing or rales  Abdominal:      General: Abdomen is flat  Bowel sounds are normal  There is no distension  Palpations: Abdomen is soft  Tenderness: There is no abdominal tenderness  There is no guarding  Musculoskeletal:      Right lower leg: No edema  Left lower leg: No edema  Skin:     General: Skin is warm and dry  Neurological:      General: No focal deficit present  Mental Status: He is alert and oriented to person, place, and time  Psychiatric:         Mood and Affect: Mood normal          Behavior: Behavior normal        LABORATORY DATA     Labs: I have personally reviewed pertinent reports    Results from last 7 days   Lab Units 03/09/21 0453 03/08/21  0552 03/07/21  1810   WBC Thousand/uL 12 10* 9 34 9 48   HEMOGLOBIN g/dL 10 4* 11 8* 12 1   HEMATOCRIT % 33 2* 37 7 38 8   PLATELETS Thousands/uL 307 239 264   MONO PCT %  --   --  1*      Results from last 7 days   Lab Units 03/09/21 0453 03/08/21  0552 03/07/21  1810   POTASSIUM mmol/L 3 8 3 8 4 6   CHLORIDE mmol/L 109* 107 107   CO2 mmol/L 25 23 26   BUN mg/dL 20 21 24   CREATININE mg/dL 0 69 0 69 1 15   CALCIUM mg/dL 7 6* 7 9* 8 3   ALK PHOS U/L 55 49 58   ALT U/L 81* 66 79*   AST U/L 76* 52* 77*     Results from last 7 days   Lab Units 03/07/21  1810   MAGNESIUM mg/dL 2 4          Results from last 7 days   Lab Units 03/09/21  0243 03/08/21  1819 03/08/21  1112  03/07/21  1810   INR   --   --   --   --  1 23*   PTT seconds 58* 54* 52*   < > 32    < > = values in this interval not displayed  Results from last 7 days   Lab Units 03/09/21  0453   LACTIC ACID mmol/L 2 5*     Results from last 7 days   Lab Units 03/07/21  1814   TROPONIN I ng/mL <0 02       IMAGING & DIAGNOSTIC TESTING     Radiology Results: I have personally reviewed pertinent reports  Xr Chest 1 View Portable    Result Date: 3/8/2021  Impression: Diffuse patchy groundglass opacity throughout the lung fields  No effusions or pneumothorax  In the setting of clinically suspected/proven COVID-19, this plain film appearance while nonspecific, can be seen in cases of viral pneumonia such as COVID-19  Workstation performed: [id number]     Ct Head Without Contrast    Result Date: 3/7/2021  Impression: Acute pansinusitis  No sign of an acute transcortical infarction  No acute intracranial hemorrhage  Workstation performed: [id number]     Cta Ed Chest Pe Study    Result Date: 3/7/2021  Impression: No evidence of pulmonary emboli  Extensive groundglass infiltrates related to Covid pneumonia  Trace pleural effusions  Mild mediastinal and hilar adenopathy Workstation performed: [id number]     Other Diagnostic Testing: I have personally reviewed pertinent reports      ACTIVE MEDICATIONS     Current Facility-Administered Medications   Medication Dose Route Frequency    acetaminophen (TYLENOL) tablet 650 mg  650 mg Oral Q6H PRN    ascorbic acid (VITAMIN C) tablet 1,000 mg  1,000 mg Oral Q12H [address]    atorvastatin (LIPITOR) tablet 40 mg  40 mg Oral HS    ceftriaxone (ROCEPHIN) 1 g/50 mL in dextrose IVPB  1,000 mg Intravenous Q24H    cholecalciferol (VITAMIN D3) tablet 2,000 Units  2,000 Units Oral Daily    dexamethasone (DECADRON) injection 6 mg  6 mg Intravenous Q24H    digoxin (LANOXIN) injection 250 mcg  250 mcg Intravenous Q4H    [START ON 3/10/2021] digoxin (LANOXIN) tablet 125 mcg  125 mcg Oral Daily    docusate sodium (COLACE) capsule 100 mg  100 mg Oral BID    doxycycline hyclate (VIBRAMYCIN) capsule 100 mg  100 mg Oral Q12H    famotidine (PEPCID) tablet 20 mg  20 mg Oral BID    heparin (porcine) 25,000 units in 0 45% NaCl 250 mL infusion (premix)  3-20 Units/kg/hr (Order-Specific) Intravenous Titrated    heparin (porcine) injection 1,950 Units  1,950 Units Intravenous Q1H PRN    heparin (porcine) injection 3,900 Units  3,900 Units Intravenous Q1H PRN    melatonin tablet 3 mg  3 mg Oral HS    metoprolol tartrate (LOPRESSOR) partial tablet 12 5 mg  12 5 mg Oral Q8H    multi-electrolyte (ISOLYTE-S PH 7 4) bolus 500 mL  500 mL Intravenous Once    multi-electrolyte (PLASMALYTE-A/ISOLYTE-S PH 7 4) IV solution  75 mL/hr Intravenous Continuous    zinc sulfate (ZINCATE) capsule 220 mg  220 mg Oral Daily    Followed by   Claudene Durham ON 3/15/2021] multivitamin-minerals (CENTRUM ADULTS) tablet 1 tablet  1 tablet Oral Daily    ondansetron (ZOFRAN) injection 4 mg  4 mg Intravenous Q6H PRN    remdesivir (Veklury) 100 mg in sodium chloride 0 9 % 250 mL IVPB  100 mg Intravenous Q24H    senna (SENOKOT) tablet 8 6 mg  1 tablet Oral Daily       VTE Pharmacologic Prophylaxis: Heparin  VTE Mechanical Prophylaxis: sequential compression device    Portions of the record may have been created with voice recognition software  Occasional wrong word or "sound a like" substitutions may have occurred due to the inherent limitations of voice recognition software    Read the chart carefully and recognize, using context, where substitutions have occurred   ==  Paralee Ache, [address] [facility]  Internal Medicine Residency PGY-2

## 2021-03-09 NOTE — ASSESSMENT & PLAN NOTE
Secondary to COVID-19 pneumonia  Status post 2 L IV fluid boluses  Worsening respiratory status possibly due to volume overload secondary to IV fluid administration   Diuresis as described above  Management of COVID-19 as above

## 2021-03-09 NOTE — CONSULTS
Consultation - Cardiology   MISSION [facility] 66 y o  male MRN: [id number]  Unit/Bed#: -01 Encounter: [id number]    Assessment/Plan     Principal Problem:    Pneumonia due to COVID-19 virus  Active Problems:    Atrial fibrillation with RVR ([address] )    Acute respiratory failure with hypoxia (HCC)    Transaminitis      Assessment/Plan    1  Atrial fibrillation with RVR- new diagnosis  Presented with afib with RVR  EKG Sept 2020- NSR RBBB  IV Cardizem has been stopped  He has been started on Lopressor 12 5 b i d -received his 1st dose this morning  Not much BP room  Will place hold parameters for SBP under 100  Will change this to 12 5mg TID  IV heparin for [facility], [place], operated by [facility]  CHADS2-VASc= 2 for age  As well is in hypercoagulable state with COVID infection  Would recommend discussion of oral anticoagulant  with family  I would be hesitant to use amiodarone since we do not know timing of onset of his atrial fibrillation however has been at least 48 hours since admission  He has not been on anticoagulation previously  Will load with digoxin total 1 gram and place on daily  TTE has been ordered  TSH- 1 4    2  Acute hypoxic respiratory failure  CT of the chest no PE  Extensive ground-glass infiltrates related to COVID pneumonia  ProBNP 2, 948 (no baseline)  Troponin negative x1  Appears that he remains on 3 L of oxygen during his hospitalization  Per RN reports crackles this morning and intravenous fluids were decreased  Per Medicine notes this morning was felt to be euvolemic  Would recommend monitoring closely for decompensation  3  COVID-19-on moderate pathway  remdesivir, Decadron, ceftriaxone and doxycycline  Tested positive March 1, 2021    Mild hypoxia on presentation  Elevated inflammatory markers  CT of the chest extensive ground-glass infiltrates    History of Present Illness   Physician Requesting Consult: Papo Wiseman MD  Reason for Consult / Principal Problem:  Atrial fibrillation    HPI: [facility] [place] Garduno is a 66y o  year old male who presents with 2 weeks of increasing shortness of breath  He did test positive for COVID March 1, 2021 with symptoms of fever, chills, diarrhea and myalgias  His daughter visited him on the 7th and he seemed to be more confused and more short of breath  Pulse ox 88% on room air  The daughter was directed by his PCP that she should bring him to the ED  Recent poor p o  Intake  He was noted to be in atrial fibrillation with RVR  Was started Cardizem drip  This has since been discontinued and he was started on Lopressor 12 5 p o  B i d  Due to hold parameters his 1st dose that he received was just this morning it 12 5 mg  Nurse was concerned this morning that he has crackles and did not previously  He had fluids running previously at 150/hr  And these were decreased to 75 an hour this am      Mild transaminitis, mild lactic acidosis  Elevated procalcitonin and D-dimer  Chest x-ray diffuse patchy infiltrates  CTA of the chest no PE but extensive ground-glass infiltrates related to COVID pneumonia  Trace bilateral pleural effusions  Lactic acid 3 5 which is improving  D-dimer 4 58    AST 77/ALT 79  Lactic acid remains elevated  He is receiving fluid resuscitation  Previously was at 150 cc an hour  This was decreased to 75 cc an hour due to  crackles this morning and lung fields  E- Consultation with Cardiology 09/2000-  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  , atypical chest pain at rest not with activity  No significant risk factors  Given the option of observation versus stress testing  Due to COVID isolation I did not enter patient's room  He does speak [place]  I spoke with patient's RN  Will communicate with primary team   Notes reviewed      Inpatient consult to Cardiology  Consult performed by: FABIO Martinez  Consult ordered by: Angel Valiente DO          Review of Systems   Constitutional: Positive for activity change, appetite change, chills, fatigue and fever  HENT: Negative  Eyes: Negative  Respiratory: Positive for shortness of breath  Cardiovascular: Positive for chest pain  Musculoskeletal: Positive for myalgias  Skin: Negative  Neurological: Negative  Hematological: Negative  Psychiatric/Behavioral: Negative  Historical Information   History reviewed  No pertinent past medical history  History reviewed  No pertinent surgical history  Social History     Substance and Sexual Activity   Alcohol Use Not Currently     Social History     Substance and Sexual Activity   Drug Use Never     Social History     Tobacco Use   Smoking Status Never Smoker   Smokeless Tobacco Never Used     Family History: History reviewed  No pertinent family history      Meds/Allergies   current meds:   Current Facility-Administered Medications   Medication Dose Route Frequency    acetaminophen (TYLENOL) tablet 650 mg  650 mg Oral Q6H PRN    ascorbic acid (VITAMIN C) tablet 1,000 mg  1,000 mg Oral Q12H [address]    atorvastatin (LIPITOR) tablet 40 mg  40 mg Oral HS    ceftriaxone (ROCEPHIN) 1 g/50 mL in dextrose IVPB  1,000 mg Intravenous Q24H    cholecalciferol (VITAMIN D3) tablet 2,000 Units  2,000 Units Oral Daily    dexamethasone (DECADRON) injection 6 mg  6 mg Intravenous Q24H    docusate sodium (COLACE) capsule 100 mg  100 mg Oral BID    doxycycline hyclate (VIBRAMYCIN) capsule 100 mg  100 mg Oral Q12H    famotidine (PEPCID) tablet 20 mg  20 mg Oral BID    heparin (porcine) 25,000 units in 0 45% NaCl 250 mL infusion (premix)  3-20 Units/kg/hr (Order-Specific) Intravenous Titrated    heparin (porcine) injection 1,950 Units  1,950 Units Intravenous Q1H PRN    heparin (porcine) injection 3,900 Units  3,900 Units Intravenous Q1H PRN    melatonin tablet 3 mg  3 mg Oral HS    metoprolol tartrate (LOPRESSOR) partial tablet 12 5 mg  12 5 mg Oral Q12H [address]    multi-electrolyte (ISOLYTE-S PH 7 4) bolus 500 mL  500 mL Intravenous Once    multi-electrolyte (PLASMALYTE-A/ISOLYTE-S PH 7 4) IV solution  75 mL/hr Intravenous Continuous    zinc sulfate (ZINCATE) capsule 220 mg  220 mg Oral Daily    Followed by   Starlett Kind ON 3/15/2021] multivitamin-minerals (CENTRUM ADULTS) tablet 1 tablet  1 tablet Oral Daily    ondansetron (ZOFRAN) injection 4 mg  4 mg Intravenous Q6H PRN    remdesivir (Veklury) 100 mg in sodium chloride 0 9 % 250 mL IVPB  100 mg Intravenous Q24H    senna (SENOKOT) tablet 8 6 mg  1 tablet Oral Daily    and PTA meds:    No medications prior to admission  No Known Allergies    Objective   Vitals: Blood pressure 111/93, pulse 63, temperature 97 6 °F (36 4 °C), temperature source Axillary, resp  rate 18, height 5' 8" (1 727 m), weight 67 kg (147 lb 11 3 oz), SpO2 93 %  Orthostatic Blood Pressures      Most Recent Value   Blood Pressure  111/93 filed at 03/09/2021 0911   Patient Position - Orthostatic VS  Lying filed at 03/09/2021 0729            Intake/Output Summary (Last 24 hours) at 3/9/2021 0920  Last data filed at 3/9/2021 0904  Gross per 24 hour   Intake 6690 ml   Output 1300 ml   Net 5390 ml       Invasive Devices     Peripheral Intravenous Line            Peripheral IV 03/07/21 Left Antecubital 1 day    Peripheral IV 03/07/21 Right Antecubital 1 day    Peripheral IV 03/08/21 Left Forearm 1 day                Physical Exam: /93   Pulse 63   Temp 97 6 °F (36 4 °C) (Axillary)   Resp 18   Ht 5' 8" (1 727 m)   Wt 67 kg (147 lb 11 3 oz)   SpO2 93%   BMI 22 46 kg/m²      Exam performed from window and per RN/ primary team notes    I did not enter patient's room and examined him due to COVID isolation    General Appearance:    Alert, cooperative, no distress, appears stated age   Head:    Normocephalic, no scleral icterus   Eyes:    PERRL   Nose:   Nares normal, septum midline, mucosa normal, no drainage    Throat:   Lips, mucosa, and tongue normal   Neck:   Supple, symmetrical, trachea midline Back:     Symmetric   Lungs:     Rhonchi/crackles  to auscultation bilaterally, respirations slight tachypnea        Heart:    Irregular rate and rhythm, S1 and S2 normal, no murmur, rub   or gallop   Abdomen:     Soft, non-tender, bowel sounds active all four quadrants       Extremities:   Extremities normal, atraumatic, no cyanosis or edema   Pulses:   2+ and symmetric all extremities   Skin:   Skin color, texture, turgor normal, no rashes or lesions   Neurologic:   Alert and oriented to person place and time  No focal deficits       Lab Results:   Recent Results (from the past 72 hour(s))   ECG 12 lead    Collection Time: 03/07/21  6:08 PM   Result Value Ref Range    Ventricular Rate 145 BPM    Atrial Rate 468 BPM    NM Interval  ms    QRSD Interval 114 ms    QT Interval 262 ms    QTC Interval 406 ms    P Axis  degrees    QRS Axis 260 degrees    T Wave Axis 55 degrees   APTT    Collection Time: 03/07/21  6:10 PM   Result Value Ref Range    PTT 32 23 - 37 seconds   Protime-INR    Collection Time: 03/07/21  6:10 PM   Result Value Ref Range    Protime 15 5 (H) 11 6 - 14 5 seconds    INR 1 23 (H) 0 84 - 1 19   Blood culture #2    Collection Time: 03/07/21  6:10 PM    Specimen: Arm, Left; Blood   Result Value Ref Range    Blood Culture No Growth at 24 hrs      CBC and differential    Collection Time: 03/07/21  6:10 PM   Result Value Ref Range    WBC 9 48 4 31 - 10 16 Thousand/uL    RBC 4 87 3 88 - 5 62 Million/uL    Hemoglobin 12 1 12 0 - 17 0 g/dL    Hematocrit 38 8 36 5 - 49 3 %    MCV 80 (L) 82 - 98 fL    MCH 24 8 (L) 26 8 - 34 3 pg    MCHC 31 2 (L) 31 4 - 37 4 g/dL    RDW 16 0 (H) 11 6 - 15 1 %    MPV 11 3 8 9 - 12 7 fL    Platelets 079 392 - 118 Thousands/uL    nRBC 0 /100 WBCs   Comprehensive metabolic panel    Collection Time: 03/07/21  6:10 PM   Result Value Ref Range    Sodium 139 136 - 145 mmol/L    Potassium 4 6 3 5 - 5 3 mmol/L    Chloride 107 100 - 108 mmol/L    CO2 26 21 - 32 mmol/L    ANION GAP 6 4 - 13 mmol/L    BUN 24 5 - 25 mg/dL    Creatinine 1 15 0 60 - 1 30 mg/dL    Glucose 131 65 - 140 mg/dL    Calcium 8 3 8 3 - 10 1 mg/dL    Corrected Calcium 9 4 8 3 - 10 1 mg/dL    AST 77 (H) 5 - 45 U/L    ALT 79 (H) 12 - 78 U/L    Alkaline Phosphatase 58 46 - 116 U/L    Total Protein 7 1 6 4 - 8 2 g/dL    Albumin 2 6 (L) 3 5 - 5 0 g/dL    Total Bilirubin 0 90 0 20 - 1 00 mg/dL    eGFR 61 ml/min/1 73sq m   Lactic acid    Collection Time: 03/07/21  6:10 PM   Result Value Ref Range    LACTIC ACID 2 5 (HH) 0 5 - 2 0 mmol/L   Procalcitonin with AM Reflex    Collection Time: 03/07/21  6:10 PM   Result Value Ref Range    Procalcitonin 1 41 (H) <=0 25 ng/ml   D-dimer, quantitative    Collection Time: 03/07/21  6:10 PM   Result Value Ref Range    D-Dimer, Quant 4 58 (H) <0 50 ug/ml FEU   Magnesium    Collection Time: 03/07/21  6:10 PM   Result Value Ref Range    Magnesium 2 4 1 6 - 2 6 mg/dL   Manual Differential(PHLEBS Do Not Order)    Collection Time: 03/07/21  6:10 PM   Result Value Ref Range    Segmented % 89 (H) 43 - 75 %    Bands % 1 0 - 8 %    Lymphocytes % 6 (L) 14 - 44 %    Monocytes % 1 (L) 4 - 12 %    Eosinophils, % 0 0 - 6 %    Basophils % 0 0 - 1 %    Atypical Lymphocytes % 3 (H) <=0 %    Absolute Neutrophils 8 53 (H) 1 85 - 7 62 Thousand/uL    Lymphocytes Absolute 0 57 (L) 0 60 - 4 47 Thousand/uL    Monocytes Absolute 0 09 0 00 - 1 22 Thousand/uL    Eosinophils Absolute 0 00 0 00 - 0 40 Thousand/uL    Basophils Absolute 0 00 0 00 - 0 10 Thousand/uL    Total Counted      RBC Morphology Present     Polychromasia Present     Platelet Estimate Adequate Adequate    Clumped Platelets Present    Troponin I    Collection Time: 03/07/21  6:14 PM   Result Value Ref Range    Troponin I <0 02 <=0 04 ng/mL   TSH, 3rd generation with Free T4 reflex    Collection Time: 03/07/21  6:14 PM   Result Value Ref Range    TSH 3RD GENERATON 1 460 0 358 - 3 740 uIU/mL   Fingerstick Glucose (POCT)    Collection Time: 03/07/21  6:44 PM Result Value Ref Range    POC Glucose 120 65 - 140 mg/dl   Blood culture #1    Collection Time: 03/07/21  6:57 PM    Specimen: Arm, Right; Blood   Result Value Ref Range    Blood Culture No Growth at 24 hrs      UA w Reflex to Microscopic w Reflex to Culture    Collection Time: 03/07/21  8:41 PM    Specimen: Urine, Clean Catch   Result Value Ref Range    Color, UA Lillie     Clarity, UA Clear     Specific [place], UA 1 010 1 003 - 1 030    pH, UA 6 0 4 5, 5 0, 5 5, 6 0, 6 5, 7 0, 7 5, 8 0    Leukocytes, UA Negative Negative    Nitrite, UA Negative Negative    Protein,  (3+) (A) Negative mg/dl    Glucose, UA Negative Negative mg/dl    Ketones, UA Negative Negative mg/dl    Urobilinogen, UA 0 2 0 2, 1 0 E U /dl E U /dl    Bilirubin, UA Negative Negative    Blood, UA Trace (A) Negative   Urine Microscopic    Collection Time: 03/07/21  8:41 PM   Result Value Ref Range    RBC, UA None Seen None Seen, 2-4 /hpf    WBC, UA None Seen None Seen, 2-4 /hpf    Epithelial Cells Occasional None Seen, Occasional /hpf    Bacteria, UA Occasional None Seen, Occasional /hpf    MUCUS THREADS Occasional (A) None Seen   ECG 12 lead    Collection Time: 03/08/21 12:08 AM   Result Value Ref Range    Ventricular Rate 109 BPM    Atrial Rate 102 BPM    [place] Interval  ms    QRSD Interval 124 ms    QT Interval 386 ms    QTC Interval 519 ms    P Axis  degrees    QRS Axis -82 degrees    T Wave Axis 22 degrees   ECG 12 lead    Collection Time: 03/08/21 12:09 AM   Result Value Ref Range    Ventricular Rate 104 BPM    Atrial Rate 58 BPM    [place] Interval  ms    QRSD Interval 130 ms    QT Interval 392 ms    QTC Interval 515 ms    P Axis  degrees    QRS Axis -84 degrees    T Wave Axis -27 degrees   Lactic acid 2 Hours    Collection Time: 03/08/21 12:37 AM   Result Value Ref Range    LACTIC ACID 2 2 (HH) 0 5 - 2 0 mmol/L   APTT six (6) hours after Heparin bolus/drip initiation or dosing change    Collection Time: 03/08/21 12:38 AM   Result Value Ref Range    PTT 67 (H) 23 - 37 seconds   Chronic Hepatitis Panel    Collection Time: 03/08/21 12:38 AM   Result Value Ref Range    Hepatitis B Surface Ag Non-reactive Non-reactive, NonReactive - Confirmed    Hepatitis C Ab Non-reactive Non-reactive    Hep B C IgM Non-reactive Non-reactive    Hep B Core Total Ab Non-reactive Non-reactive   Lactic acid, plasma    Collection Time: 03/08/21  4:54 AM   Result Value Ref Range    LACTIC ACID 2 3 (HH) 0 5 - 2 0 mmol/L   APTT    Collection Time: 03/08/21  4:54 AM   Result Value Ref Range    PTT 48 (H) 23 - 37 seconds   CBC and differential    Collection Time: 03/08/21  5:52 AM   Result Value Ref Range    WBC 9 34 4 31 - 10 16 Thousand/uL    RBC 4 64 3 88 - 5 62 Million/uL    Hemoglobin 11 8 (L) 12 0 - 17 0 g/dL    Hematocrit 37 7 36 5 - 49 3 %    MCV 81 (L) 82 - 98 fL    MCH 25 4 (L) 26 8 - 34 3 pg    MCHC 31 3 (L) 31 4 - 37 4 g/dL    RDW 16 3 (H) 11 6 - 15 1 %    MPV 12 6 8 9 - 12 7 fL    Platelets 718 506 - 604 Thousands/uL    nRBC 0 /100 WBCs   Comprehensive metabolic panel    Collection Time: 03/08/21  5:52 AM   Result Value Ref Range    Sodium 139 136 - 145 mmol/L    Potassium 3 8 3 5 - 5 3 mmol/L    Chloride 107 100 - 108 mmol/L    CO2 23 21 - 32 mmol/L    ANION GAP 9 4 - 13 mmol/L    BUN 21 5 - 25 mg/dL    Creatinine 0 69 0 60 - 1 30 mg/dL    Glucose 126 65 - 140 mg/dL    Calcium 7 9 (L) 8 3 - 10 1 mg/dL    Corrected Calcium 9 4 8 3 - 10 1 mg/dL    AST 52 (H) 5 - 45 U/L    ALT 66 12 - 78 U/L    Alkaline Phosphatase 49 46 - 116 U/L    Total Protein 6 2 (L) 6 4 - 8 2 g/dL    Albumin 2 1 (L) 3 5 - 5 0 g/dL    Total Bilirubin 0 88 0 20 - 1 00 mg/dL    eGFR 92 ml/min/1 73sq m   Procalcitonin with AM Reflex    Collection Time: 03/08/21  5:52 AM   Result Value Ref Range    Procalcitonin 1 18 (H) <=0 25 ng/ml   C-reactive protein    Collection Time: 03/08/21  5:52 AM   Result Value Ref Range     0 (H) <3 0 mg/L   Ferritin    Collection Time: 03/08/21  5:52 AM   Result Value Ref Range    Ferritin 299 8 - 388 ng/mL   D-dimer, quantitative    Collection Time: 03/08/21  5:52 AM   Result Value Ref Range    D-Dimer, Quant 3 50 (H) <0 50 ug/ml FEU   NT-BNP PRO    Collection Time: 03/08/21  5:52 AM   Result Value Ref Range    NT-proBNP 2,943 (H) <450 pg/mL   CK (with reflex to MB)    Collection Time: 03/08/21  5:52 AM   Result Value Ref Range    Total CK 89 39 - 308 U/L   Lactic acid 2 Hours    Collection Time: 03/08/21 11:12 AM   Result Value Ref Range    LACTIC ACID 3 3 (HH) 0 5 - 2 0 mmol/L   APTT    Collection Time: 03/08/21 11:12 AM   Result Value Ref Range    PTT 52 (H) 23 - 37 seconds   APTT    Collection Time: 03/08/21  6:19 PM   Result Value Ref Range    PTT 54 (H) 23 - 37 seconds   Lactic acid, plasma    Collection Time: 03/08/21  6:19 PM   Result Value Ref Range    LACTIC ACID 3 5 (HH) 0 5 - 2 0 mmol/L   Lactic acid 2 Hours    Collection Time: 03/08/21  9:55 PM   Result Value Ref Range    LACTIC ACID 3 1 (HH) 0 5 - 2 0 mmol/L   Lactic acid, plasma    Collection Time: 03/09/21  1:55 AM   Result Value Ref Range    LACTIC ACID 2 6 (HH) 0 5 - 2 0 mmol/L   APTT    Collection Time: 03/09/21  2:43 AM   Result Value Ref Range    PTT 58 (H) 23 - 37 seconds   Procalcitonin Reflex    Collection Time: 03/09/21  4:53 AM   Result Value Ref Range    Procalcitonin 0 91 (H) <=0 25 ng/ml   Lactic acid 2 Hours    Collection Time: 03/09/21  4:53 AM   Result Value Ref Range    LACTIC ACID 2 5 (HH) 0 5 - 2 0 mmol/L   CBC    Collection Time: 03/09/21  4:53 AM   Result Value Ref Range    WBC 12 10 (H) 4 31 - 10 16 Thousand/uL    RBC 4 14 3 88 - 5 62 Million/uL    Hemoglobin 10 4 (L) 12 0 - 17 0 g/dL    Hematocrit 33 2 (L) 36 5 - 49 3 %    MCV 80 (L) 82 - 98 fL    MCH 25 1 (L) 26 8 - 34 3 pg    MCHC 31 3 (L) 31 4 - 37 4 g/dL    RDW 16 3 (H) 11 6 - 15 1 %    Platelets 824 074 - 130 Thousands/uL    MPV 11 8 8 9 - 12 7 fL   Comprehensive metabolic panel    Collection Time: 03/09/21  4:53 AM   Result Value Ref Range    Sodium 141 136 - 145 mmol/L    Potassium 3 8 3 5 - 5 3 mmol/L    Chloride 109 (H) 100 - 108 mmol/L    CO2 25 21 - 32 mmol/L    ANION GAP 7 4 - 13 mmol/L    BUN 20 5 - 25 mg/dL    Creatinine 0 69 0 60 - 1 30 mg/dL    Glucose 130 65 - 140 mg/dL    Calcium 7 6 (L) 8 3 - 10 1 mg/dL    Corrected Calcium 9 0 8 3 - 10 1 mg/dL    AST 76 (H) 5 - 45 U/L    ALT 81 (H) 12 - 78 U/L    Alkaline Phosphatase 55 46 - 116 U/L    Total Protein 5 9 (L) 6 4 - 8 2 g/dL    Albumin 2 3 (L) 3 5 - 5 0 g/dL    Total Bilirubin 0 64 0 20 - 1 00 mg/dL    eGFR 92 ml/min/1 73sq m   Imaging: I have personally reviewed pertinent reports  EKG: atrial fibrillation  VTE Prophylaxis: Heparin    Code Status: Level 1 - Full Code  Advance Directive and Living Will:      Power of :    POLST:      Counseling / Coordination of Care  Total floor / unit time spent today 45 minutes  Greater than 50% of total time was spent with the patient and / or family counseling and / or coordination of care

## 2021-03-09 NOTE — CASE MANAGEMENT
CM TC to [address] CM spoke with pharmacist Slime who reported that the Xarelto medication comes to a $4 copay  CM TC to pt's dtr Kimberlee Bolton and informed her of the same, Kimberlee Bolton is in agreement and reported that she will pay the fee upon pt d/c

## 2021-03-09 NOTE — ASSESSMENT & PLAN NOTE
Down trending in the setting of sepsis secondary to COVID-19 pneumonia  Currently 2 5  Has received over 2 L of IV resuscitation fluids  Other possible etiologies include heart failure though he appears euvolemic on exam   Echocardiogram is pending  Will trend lactic acid daily for now in order to minimize blood draws  Continue to monitor patient clinically

## 2021-03-09 NOTE — RESTORATIVE TECHNICIAN NOTE
Restorative Specialist Mobility Note       Activity: Ambulate in room(back to bed)     Assistive Device: None

## 2021-03-09 NOTE — ASSESSMENT & PLAN NOTE
Due to COVID pneumonia  Appears euvolemic on exam  BNP however was elevated at 2900, no prior baseline  Echocardiogram ordered and pending  Continue to wean oxygen, currently on 3 L

## 2021-03-09 NOTE — ASSESSMENT & PLAN NOTE
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  Patient is not on anticoagulation at home although he was previously taking aspirin daily but stopped due to fatigue  CHADS-VASc 2 due to patient age  Although patient was hypertensive in the ED, review of prior records showsnormal blood pressures at previous office visits  Patient also does not have a documented history of CAD and never underwent catheterization  S/p cardizem drip in the ED without improvement in heart rates  Denies chest pain, orthopnea, leg swelling    - TSH within normal limits  - CTA no evidence of PE  - Possibly due to acute COVID infection vs prior MI  Plan:  - continue Lopressor 12 5 b i d , patient has not been receiving due to low blood pressures  Rates today are not controlled, ranging from 130-160    Considering amiodarone bolus and drip as his blood pressures are not able to tolerate increased doses of Lopressor, however will consult Cardiology for further recommendations, appreciated  - if patient has sustained AFib with RVR will consider starting patient on amiodarone given his hypotension  - continue heparin drip, price check for Xarelto given to , will follow-up and switch patient if he is able to afford medication  - closely monitor HR and blood pressures on telemetry

## 2021-03-09 NOTE — ASSESSMENT & PLAN NOTE
AST ALT 70s  Likely due to recent infection  Chronic hepatitis panel negative  Will continue to monitor  Continue to trend LFTs

## 2021-03-09 NOTE — ASSESSMENT & PLAN NOTE
Patient with approximately two weeks of shortness of breath, fevers, chills, myalgias, diarrhea, poor PO intake, fatigue  Tested positive for COVID on 3/1/21 but had been having symptoms before that  Diarrhea has resolved  Per daughter, patient appeared more confused today  Patient satting 88% on room air on arrival to ED with improvement to 96% on 4L nasal cannula  - Labs: AST 77, ALT 79, albumin 2 6, lactic acid 2 5, procalcitonin 1 41, d-dimer 4 58, blood cultures pending, procal 1 41  -D-dimer trending downward, BNP elevated at 2900, no prior baseline  - Chest x-ray in the ED showing diffuse patchy infiltrates  - CT head showing acute pansinusitis, patient asymptomatic  - CTA showed no evidence of pulmonary emboli but with extensive ground-glass infiltrates related to COVID pneumonia and trace pleural effusions    - no smoking or drug history    Plan:   - COVID moderate pathway  - continue remdesivir, Decadron, ceftriaxone, doxycycline, day 3  - trend inflammatory markers  - blood cultures negative for 24 hours  - chronic hepatitis panel negative  - anticoagulation with heparin drip  - wean O2 as tolerated, currently on 3 L  - aggressive pulmonary hygeine  - continue to monitor on telemetry

## 2021-03-10 ENCOUNTER — APPOINTMENT (INPATIENT)
Dept: RADIOLOGY | Facility: HOSPITAL | Age: 76
DRG: 871 | End: 2021-03-10
Payer: COMMERCIAL

## 2021-03-10 ENCOUNTER — APPOINTMENT (INPATIENT)
Dept: NON INVASIVE DIAGNOSTICS | Facility: HOSPITAL | Age: 76
DRG: 871 | End: 2021-03-10
Payer: COMMERCIAL

## 2021-03-10 LAB
ALBUMIN SERPL BCP-MCNC: 2.4 G/DL (ref 3.5–5)
ALP SERPL-CCNC: 77 U/L (ref 46–116)
ALT SERPL W P-5'-P-CCNC: 137 U/L (ref 12–78)
ANION GAP SERPL CALCULATED.3IONS-SCNC: 8 MMOL/L (ref 4–13)
AST SERPL W P-5'-P-CCNC: 105 U/L (ref 5–45)
BILIRUB SERPL-MCNC: 0.64 MG/DL (ref 0.2–1)
BUN SERPL-MCNC: 18 MG/DL (ref 5–25)
CALCIUM ALBUM COR SERPL-MCNC: 9.4 MG/DL (ref 8.3–10.1)
CALCIUM SERPL-MCNC: 8.1 MG/DL (ref 8.3–10.1)
CHLORIDE SERPL-SCNC: 109 MMOL/L (ref 100–108)
CO2 SERPL-SCNC: 21 MMOL/L (ref 21–32)
CREAT SERPL-MCNC: 0.75 MG/DL (ref 0.6–1.3)
CRP SERPL QL: 77.5 MG/L
ERYTHROCYTE [DISTWIDTH] IN BLOOD BY AUTOMATED COUNT: 16.3 % (ref 11.6–15.1)
FERRITIN SERPL-MCNC: 257 NG/ML (ref 8–388)
GFR SERPL CREATININE-BSD FRML MDRD: 88 ML/MIN/1.73SQ M
GLUCOSE SERPL-MCNC: 108 MG/DL (ref 65–140)
HCT VFR BLD AUTO: 37.1 % (ref 36.5–49.3)
HGB BLD-MCNC: 11.5 G/DL (ref 12–17)
MCH RBC QN AUTO: 25.1 PG (ref 26.8–34.3)
MCHC RBC AUTO-ENTMCNC: 31 G/DL (ref 31.4–37.4)
MCV RBC AUTO: 81 FL (ref 82–98)
NRBC BLD AUTO-RTO: 0 /100 WBCS
PLATELET # BLD AUTO: 326 THOUSANDS/UL (ref 149–390)
PMV BLD AUTO: 11.8 FL (ref 8.9–12.7)
POTASSIUM SERPL-SCNC: 4.6 MMOL/L (ref 3.5–5.3)
PROCALCITONIN SERPL-MCNC: 0.61 NG/ML
PROT SERPL-MCNC: 6.3 G/DL (ref 6.4–8.2)
RBC # BLD AUTO: 4.58 MILLION/UL (ref 3.88–5.62)
SODIUM SERPL-SCNC: 138 MMOL/L (ref 136–145)
WBC # BLD AUTO: 14.01 THOUSAND/UL (ref 4.31–10.16)

## 2021-03-10 PROCEDURE — 84145 PROCALCITONIN (PCT): CPT | Performed by: STUDENT IN AN ORGANIZED HEALTH CARE EDUCATION/TRAINING PROGRAM

## 2021-03-10 PROCEDURE — 80053 COMPREHEN METABOLIC PANEL: CPT | Performed by: STUDENT IN AN ORGANIZED HEALTH CARE EDUCATION/TRAINING PROGRAM

## 2021-03-10 PROCEDURE — 94760 N-INVAS EAR/PLS OXIMETRY 1: CPT

## 2021-03-10 PROCEDURE — 99231 SBSQ HOSP IP/OBS SF/LOW 25: CPT | Performed by: INTERNAL MEDICINE

## 2021-03-10 PROCEDURE — 82728 ASSAY OF FERRITIN: CPT | Performed by: STUDENT IN AN ORGANIZED HEALTH CARE EDUCATION/TRAINING PROGRAM

## 2021-03-10 PROCEDURE — 93308 TTE F-UP OR LMTD: CPT

## 2021-03-10 PROCEDURE — 71045 X-RAY EXAM CHEST 1 VIEW: CPT

## 2021-03-10 PROCEDURE — 85027 COMPLETE CBC AUTOMATED: CPT | Performed by: STUDENT IN AN ORGANIZED HEALTH CARE EDUCATION/TRAINING PROGRAM

## 2021-03-10 PROCEDURE — 86140 C-REACTIVE PROTEIN: CPT | Performed by: STUDENT IN AN ORGANIZED HEALTH CARE EDUCATION/TRAINING PROGRAM

## 2021-03-10 RX ORDER — ACETAMINOPHEN 325 MG/1
650 TABLET ORAL EVERY 6 HOURS PRN
Status: DISCONTINUED | OUTPATIENT
Start: 2021-03-10 | End: 2021-03-16 | Stop reason: HOSPADM

## 2021-03-10 RX ORDER — ATORVASTATIN CALCIUM 10 MG/1
10 TABLET, FILM COATED ORAL
Status: DISCONTINUED | OUTPATIENT
Start: 2021-03-10 | End: 2021-03-10

## 2021-03-10 RX ORDER — FUROSEMIDE 10 MG/ML
40 INJECTION INTRAMUSCULAR; INTRAVENOUS
Status: DISCONTINUED | OUTPATIENT
Start: 2021-03-10 | End: 2021-03-11

## 2021-03-10 RX ADMIN — DIGOXIN 250 MCG: 0.25 INJECTION INTRAMUSCULAR; INTRAVENOUS at 00:28

## 2021-03-10 RX ADMIN — Medication 2000 UNITS: at 08:58

## 2021-03-10 RX ADMIN — STANDARDIZED SENNA CONCENTRATE 8.6 MG: 8.6 TABLET ORAL at 08:58

## 2021-03-10 RX ADMIN — Medication 12.5 MG: at 08:28

## 2021-03-10 RX ADMIN — DIGOXIN 125 MCG: 125 TABLET ORAL at 08:29

## 2021-03-10 RX ADMIN — DOXYCYCLINE 100 MG: 100 CAPSULE ORAL at 18:13

## 2021-03-10 RX ADMIN — Medication 12.5 MG: at 15:15

## 2021-03-10 RX ADMIN — FUROSEMIDE 40 MG: 10 INJECTION, SOLUTION INTRAMUSCULAR; INTRAVENOUS at 08:26

## 2021-03-10 RX ADMIN — RIVAROXABAN 20 MG: 20 TABLET, FILM COATED ORAL at 06:41

## 2021-03-10 RX ADMIN — FAMOTIDINE 20 MG: 20 TABLET ORAL at 08:58

## 2021-03-10 RX ADMIN — FUROSEMIDE 40 MG: 10 INJECTION, SOLUTION INTRAMUSCULAR; INTRAVENOUS at 15:15

## 2021-03-10 RX ADMIN — Medication 12.5 MG: at 21:06

## 2021-03-10 RX ADMIN — DOCUSATE SODIUM 100 MG: 100 CAPSULE, LIQUID FILLED ORAL at 18:13

## 2021-03-10 RX ADMIN — Medication 12.5 MG: at 00:28

## 2021-03-10 RX ADMIN — CEFTRIAXONE 1000 MG: 2 INJECTION, POWDER, FOR SOLUTION INTRAMUSCULAR; INTRAVENOUS at 18:13

## 2021-03-10 RX ADMIN — ZINC SULFATE 220 MG (50 MG) CAPSULE 220 MG: CAPSULE at 08:59

## 2021-03-10 RX ADMIN — MELATONIN 3 MG: at 21:06

## 2021-03-10 RX ADMIN — DEXAMETHASONE SODIUM PHOSPHATE 6 MG: 4 INJECTION INTRA-ARTICULAR; INTRALESIONAL; INTRAMUSCULAR; INTRAVENOUS; SOFT TISSUE at 21:06

## 2021-03-10 RX ADMIN — DOCUSATE SODIUM 100 MG: 100 CAPSULE, LIQUID FILLED ORAL at 08:58

## 2021-03-10 RX ADMIN — FAMOTIDINE 20 MG: 20 TABLET ORAL at 18:13

## 2021-03-10 RX ADMIN — OXYCODONE HYDROCHLORIDE AND ACETAMINOPHEN 1000 MG: 500 TABLET ORAL at 21:06

## 2021-03-10 RX ADMIN — OXYCODONE HYDROCHLORIDE AND ACETAMINOPHEN 1000 MG: 500 TABLET ORAL at 08:58

## 2021-03-10 NOTE — ASSESSMENT & PLAN NOTE
Due to COVID pneumonia, possible heart failure  Worsened overnight to 15 L  Chest x-ray appears grossly volume overloaded  Appears euvolemic on exam  BNP however was elevated at 2900, no prior baseline  Echocardiogram ordered and pending  Continue to wean oxygen, currently on 12 L  Will initiate diuresis with Lasix 40 mg IV b i d  and monitor response  Critical care is aware of patient in case patient decompensates

## 2021-03-10 NOTE — PROGRESS NOTES
Progress Note - Cardiology   MISSION [facility] 66 y o  male MRN: [id number]  Unit/Bed#: -01 Encounter: [id number]        Principal Problem:    Pneumonia due to COVID-19 virus  Active Problems:    Atrial fibrillation with RVR (HCC)    Acute respiratory failure with hypoxia (HCC)    Transaminitis    Lactic acidosis    Sepsis (HCC)      Assessment/Plan     1  Atrial fibrillation with RVR- new diagnosis  Presented with afib with RVR  EKG Sept 2020- NSR RBBB  IV Cardizem has been stopped 3/9 and  He has been started on Lopressor 12 5 b i d  Not much BP room but they are improving  Changed this to 12 5mg TID, receiving doses   Increase to every 6 hours  IV heparin for [facility], [place], operated by [facility]  CHADS2-VASc= 2 for age  As well is in hypercoagulable state with COVID infection  Xarelto 20mg started  We loaded with 1 gram digoxin and daily 125mcg today  HR improved but still with pds RVR  TTE has been ordered  TSH- 1 4     2  Acute hypoxic respiratory failure  CT of the chest no PE  Extensive ground-glass infiltrates related to COVID pneumonia  ProBNP 2, 948 (no baseline)  Troponin negative x1  With increasing oxygen requirements over night  Now to 12 liters  With suspected volume overload- lasix started at 40mg IV BID  Per RN diuresis began       3  COVID-19-on moderate pathway  remdesivir, Decadron, ceftriaxone and doxycycline  Tested positive March 1, 2021  Mild hypoxia on presentation  Worsening oxygenation over night  Suspected component ov volume overload  Had been receiving fluid resusitation  for lactic acidosis  Elevated inflammatory markers  CT of the chest extensive ground-glass infiltrates    Subjective/Objective   Chief Complaint/Subjective  I did not enter patients room  Per RN SOB walking to BR  Increased oxygenation requirements over night           Vitals: /70   Pulse 97   Temp 97 5 °F (36 4 °C) (Oral)   Resp 22   Ht 5' 8" (1 727 m)   Wt 67 kg (147 lb 11 3 oz)   SpO2 97%   BMI 22 46 kg/m²     Vitals: 03/07/21 1953 03/08/21 0300   Weight: 67 kg (147 lb 11 3 oz) 67 kg (147 lb 11 3 oz)     Orthostatic Blood Pressures      Most Recent Value   Blood Pressure  112/70 filed at 03/10/2021 3125   Patient Position - Orthostatic VS  Lying filed at 03/10/2021 0647            Intake/Output Summary (Last 24 hours) at 3/10/2021 1006  Last data filed at 3/10/2021 0500  Gross per 24 hour   Intake 1299 75 ml   Output 425 ml   Net 874 75 ml       Invasive Devices     Peripheral Intravenous Line            Peripheral IV 03/07/21 Left Antecubital 2 days    Peripheral IV 03/07/21 Right Antecubital 2 days    Peripheral IV 03/08/21 Left Forearm 2 days                Current Facility-Administered Medications   Medication Dose Route Frequency    acetaminophen (TYLENOL) tablet 650 mg  650 mg Oral Q6H PRN    ascorbic acid (VITAMIN C) tablet 1,000 mg  1,000 mg Oral Q12H [address]    atorvastatin (LIPITOR) tablet 40 mg  40 mg Oral HS    ceftriaxone (ROCEPHIN) 1 g/50 mL in dextrose IVPB  1,000 mg Intravenous Q24H    cholecalciferol (VITAMIN D3) tablet 2,000 Units  2,000 Units Oral Daily    dexamethasone (DECADRON) injection 6 mg  6 mg Intravenous Q24H    digoxin (LANOXIN) tablet 125 mcg  125 mcg Oral Daily    docusate sodium (COLACE) capsule 100 mg  100 mg Oral BID    doxycycline hyclate (VIBRAMYCIN) capsule 100 mg  100 mg Oral Q12H    famotidine (PEPCID) tablet 20 mg  20 mg Oral BID    furosemide (LASIX) injection 40 mg  40 mg Intravenous BID (diuretic)    melatonin tablet 3 mg  3 mg Oral HS    metoprolol tartrate (LOPRESSOR) partial tablet 12 5 mg  12 5 mg Oral Q8H    zinc sulfate (ZINCATE) capsule 220 mg  220 mg Oral Daily    Followed by   Aaron Mukund ON 3/15/2021] multivitamin-minerals (CENTRUM ADULTS) tablet 1 tablet  1 tablet Oral Daily    ondansetron (ZOFRAN) injection 4 mg  4 mg Intravenous Q6H PRN    remdesivir (Veklury) 100 mg in sodium chloride 0 9 % 250 mL IVPB  100 mg Intravenous Q24H    rivaroxaban (XARELTO) tablet 20 mg  20 mg Oral Daily With Breakfast    senna (SENOKOT) tablet 8 6 mg  1 tablet Oral Daily         Physical Exam: /70   Pulse 97   Temp 97 5 °F (36 4 °C) (Oral)   Resp 22   Ht 5' 8" (1 727 m)   Wt 67 kg (147 lb 11 3 oz)   SpO2 97%   BMI 22 46 kg/m²         I did not enter patients room  Viewed from window  Mild dyspneic  Per RN crackles lung fields  On 12 liters  No acute distress  Skin warm  Active bowel sounds  Lab Results:   Recent Results (from the past 72 hour(s))   ECG 12 lead    Collection Time: 03/07/21  6:08 PM   Result Value Ref Range    Ventricular Rate 145 BPM    Atrial Rate 468 BPM    [place] Interval  ms    QRSD Interval 114 ms    QT Interval 262 ms    QTC Interval 406 ms    P Axis  degrees    QRS Axis 260 degrees    T Wave Axis 55 degrees   APTT    Collection Time: 03/07/21  6:10 PM   Result Value Ref Range    PTT 32 23 - 37 seconds   Protime-INR    Collection Time: 03/07/21  6:10 PM   Result Value Ref Range    Protime 15 5 (H) 11 6 - 14 5 seconds    INR 1 23 (H) 0 84 - 1 19   Blood culture #2    Collection Time: 03/07/21  6:10 PM    Specimen: Arm, Left; Blood   Result Value Ref Range    Blood Culture No Growth at 48 hrs      CBC and differential    Collection Time: 03/07/21  6:10 PM   Result Value Ref Range    WBC 9 48 4 31 - 10 16 Thousand/uL    RBC 4 87 3 88 - 5 62 Million/uL    Hemoglobin 12 1 12 0 - 17 0 g/dL    Hematocrit 38 8 36 5 - 49 3 %    MCV 80 (L) 82 - 98 fL    MCH 24 8 (L) 26 8 - 34 3 pg    MCHC 31 2 (L) 31 4 - 37 4 g/dL    RDW 16 0 (H) 11 6 - 15 1 %    MPV 11 3 8 9 - 12 7 fL    Platelets 150 282 - 676 Thousands/uL    nRBC 0 /100 WBCs   Comprehensive metabolic panel    Collection Time: 03/07/21  6:10 PM   Result Value Ref Range    Sodium 139 136 - 145 mmol/L    Potassium 4 6 3 5 - 5 3 mmol/L    Chloride 107 100 - 108 mmol/L    CO2 26 21 - 32 mmol/L    ANION GAP 6 4 - 13 mmol/L    BUN 24 5 - 25 mg/dL Creatinine 1 15 0 60 - 1 30 mg/dL    Glucose 131 65 - 140 mg/dL    Calcium 8 3 8 3 - 10 1 mg/dL    Corrected Calcium 9 4 8 3 - 10 1 mg/dL    AST 77 (H) 5 - 45 U/L    ALT 79 (H) 12 - 78 U/L    Alkaline Phosphatase 58 46 - 116 U/L    Total Protein 7 1 6 4 - 8 2 g/dL    Albumin 2 6 (L) 3 5 - 5 0 g/dL    Total Bilirubin 0 90 0 20 - 1 00 mg/dL    eGFR 61 ml/min/1 73sq m   Lactic acid    Collection Time: 03/07/21  6:10 PM   Result Value Ref Range    LACTIC ACID 2 5 (HH) 0 5 - 2 0 mmol/L   Procalcitonin with AM Reflex    Collection Time: 03/07/21  6:10 PM   Result Value Ref Range    Procalcitonin 1 41 (H) <=0 25 ng/ml   D-dimer, quantitative    Collection Time: 03/07/21  6:10 PM   Result Value Ref Range    D-Dimer, Quant 4 58 (H) <0 50 ug/ml FEU   Magnesium    Collection Time: 03/07/21  6:10 PM   Result Value Ref Range    Magnesium 2 4 1 6 - 2 6 mg/dL   Manual Differential(PHLEBS Do Not Order)    Collection Time: 03/07/21  6:10 PM   Result Value Ref Range    Segmented % 89 (H) 43 - 75 %    Bands % 1 0 - 8 %    Lymphocytes % 6 (L) 14 - 44 %    Monocytes % 1 (L) 4 - 12 %    Eosinophils, % 0 0 - 6 %    Basophils % 0 0 - 1 %    Atypical Lymphocytes % 3 (H) <=0 %    Absolute Neutrophils 8 53 (H) 1 85 - 7 62 Thousand/uL    Lymphocytes Absolute 0 57 (L) 0 60 - 4 47 Thousand/uL    Monocytes Absolute 0 09 0 00 - 1 22 Thousand/uL    Eosinophils Absolute 0 00 0 00 - 0 40 Thousand/uL    Basophils Absolute 0 00 0 00 - 0 10 Thousand/uL    Total Counted      RBC Morphology Present     Polychromasia Present     Platelet Estimate Adequate Adequate    Clumped Platelets Present    Troponin I    Collection Time: 03/07/21  6:14 PM   Result Value Ref Range    Troponin I <0 02 <=0 04 ng/mL   TSH, 3rd generation with Free T4 reflex    Collection Time: 03/07/21  6:14 PM   Result Value Ref Range    TSH 3RD GENERATON 1 460 0 358 - 3 740 uIU/mL   Fingerstick Glucose (POCT)    Collection Time: 03/07/21  6:44 PM   Result Value Ref Range    POC Glucose 120 65 - 140 mg/dl   Blood culture #1    Collection Time: 03/07/21  6:57 PM    Specimen: Arm, Right; Blood   Result Value Ref Range    Blood Culture No Growth at 48 hrs      UA w Reflex to Microscopic w Reflex to Culture    Collection Time: 03/07/21  8:41 PM    Specimen: Urine, Clean Catch   Result Value Ref Range    Color, UA Lillie     Clarity, UA Clear     Specific [place], UA 1 010 1 003 - 1 030    pH, UA 6 0 4 5, 5 0, 5 5, 6 0, 6 5, 7 0, 7 5, 8 0    Leukocytes, UA Negative Negative    Nitrite, UA Negative Negative    Protein,  (3+) (A) Negative mg/dl    Glucose, UA Negative Negative mg/dl    Ketones, UA Negative Negative mg/dl    Urobilinogen, UA 0 2 0 2, 1 0 E U /dl E U /dl    Bilirubin, UA Negative Negative    Blood, UA Trace (A) Negative   Urine Microscopic    Collection Time: 03/07/21  8:41 PM   Result Value Ref Range    RBC, UA None Seen None Seen, 2-4 /hpf    WBC, UA None Seen None Seen, 2-4 /hpf    Epithelial Cells Occasional None Seen, Occasional /hpf    Bacteria, UA Occasional None Seen, Occasional /hpf    MUCUS THREADS Occasional (A) None Seen   ECG 12 lead    Collection Time: 03/08/21 12:08 AM   Result Value Ref Range    Ventricular Rate 109 BPM    Atrial Rate 102 BPM    NV Interval  ms    QRSD Interval 124 ms    QT Interval 386 ms    QTC Interval 519 ms    P Axis  degrees    QRS Axis -82 degrees    T Wave Axis 22 degrees   ECG 12 lead    Collection Time: 03/08/21 12:09 AM   Result Value Ref Range    Ventricular Rate 104 BPM    Atrial Rate 58 BPM    NV Interval  ms    QRSD Interval 130 ms    QT Interval 392 ms    QTC Interval 515 ms    P Axis  degrees    QRS Axis -84 degrees    T Wave Axis -27 degrees   Lactic acid 2 Hours    Collection Time: 03/08/21 12:37 AM   Result Value Ref Range    LACTIC ACID 2 2 (HH) 0 5 - 2 0 mmol/L   APTT six (6) hours after Heparin bolus/drip initiation or dosing change    Collection Time: 03/08/21 12:38 AM   Result Value Ref Range    PTT 67 (H) 23 - 37 seconds Chronic Hepatitis Panel    Collection Time: 03/08/21 12:38 AM   Result Value Ref Range    Hepatitis B Surface Ag Non-reactive Non-reactive, NonReactive - Confirmed    Hepatitis C Ab Non-reactive Non-reactive    Hep B C IgM Non-reactive Non-reactive    Hep B Core Total Ab Non-reactive Non-reactive   Lactic acid, plasma    Collection Time: 03/08/21  4:54 AM   Result Value Ref Range    LACTIC ACID 2 3 (HH) 0 5 - 2 0 mmol/L   APTT    Collection Time: 03/08/21  4:54 AM   Result Value Ref Range    PTT 48 (H) 23 - 37 seconds   CBC and differential    Collection Time: 03/08/21  5:52 AM   Result Value Ref Range    WBC 9 34 4 31 - 10 16 Thousand/uL    RBC 4 64 3 88 - 5 62 Million/uL    Hemoglobin 11 8 (L) 12 0 - 17 0 g/dL    Hematocrit 37 7 36 5 - 49 3 %    MCV 81 (L) 82 - 98 fL    MCH 25 4 (L) 26 8 - 34 3 pg    MCHC 31 3 (L) 31 4 - 37 4 g/dL    RDW 16 3 (H) 11 6 - 15 1 %    MPV 12 6 8 9 - 12 7 fL    Platelets 816 713 - 428 Thousands/uL    nRBC 0 /100 WBCs   Comprehensive metabolic panel    Collection Time: 03/08/21  5:52 AM   Result Value Ref Range    Sodium 139 136 - 145 mmol/L    Potassium 3 8 3 5 - 5 3 mmol/L    Chloride 107 100 - 108 mmol/L    CO2 23 21 - 32 mmol/L    ANION GAP 9 4 - 13 mmol/L    BUN 21 5 - 25 mg/dL    Creatinine 0 69 0 60 - 1 30 mg/dL    Glucose 126 65 - 140 mg/dL    Calcium 7 9 (L) 8 3 - 10 1 mg/dL    Corrected Calcium 9 4 8 3 - 10 1 mg/dL    AST 52 (H) 5 - 45 U/L    ALT 66 12 - 78 U/L    Alkaline Phosphatase 49 46 - 116 U/L    Total Protein 6 2 (L) 6 4 - 8 2 g/dL    Albumin 2 1 (L) 3 5 - 5 0 g/dL    Total Bilirubin 0 88 0 20 - 1 00 mg/dL    eGFR 92 ml/min/1 73sq m   Procalcitonin with AM Reflex    Collection Time: 03/08/21  5:52 AM   Result Value Ref Range    Procalcitonin 1 18 (H) <=0 25 ng/ml   C-reactive protein    Collection Time: 03/08/21  5:52 AM   Result Value Ref Range     0 (H) <3 0 mg/L   Ferritin    Collection Time: 03/08/21  5:52 AM   Result Value Ref Range    Ferritin 299 8 - 388 ng/mL   D-dimer, quantitative    Collection Time: 03/08/21  5:52 AM   Result Value Ref Range    D-Dimer, Quant 3 50 (H) <0 50 ug/ml FEU   NT-BNP PRO    Collection Time: 03/08/21  5:52 AM   Result Value Ref Range    NT-proBNP 2,943 (H) <450 pg/mL   CK (with reflex to MB)    Collection Time: 03/08/21  5:52 AM   Result Value Ref Range    Total CK 89 39 - 308 U/L   Lactic acid 2 Hours    Collection Time: 03/08/21 11:12 AM   Result Value Ref Range    LACTIC ACID 3 3 (HH) 0 5 - 2 0 mmol/L   APTT    Collection Time: 03/08/21 11:12 AM   Result Value Ref Range    PTT 52 (H) 23 - 37 seconds   APTT    Collection Time: 03/08/21  6:19 PM   Result Value Ref Range    PTT 54 (H) 23 - 37 seconds   Lactic acid, plasma    Collection Time: 03/08/21  6:19 PM   Result Value Ref Range    LACTIC ACID 3 5 (HH) 0 5 - 2 0 mmol/L   Lactic acid 2 Hours    Collection Time: 03/08/21  9:55 PM   Result Value Ref Range    LACTIC ACID 3 1 (HH) 0 5 - 2 0 mmol/L   Lactic acid, plasma    Collection Time: 03/09/21  1:55 AM   Result Value Ref Range    LACTIC ACID 2 6 (HH) 0 5 - 2 0 mmol/L   APTT    Collection Time: 03/09/21  2:43 AM   Result Value Ref Range    PTT 58 (H) 23 - 37 seconds   Procalcitonin Reflex    Collection Time: 03/09/21  4:53 AM   Result Value Ref Range    Procalcitonin 0 91 (H) <=0 25 ng/ml   Lactic acid 2 Hours    Collection Time: 03/09/21  4:53 AM   Result Value Ref Range    LACTIC ACID 2 5 (HH) 0 5 - 2 0 mmol/L   CBC    Collection Time: 03/09/21  4:53 AM   Result Value Ref Range    WBC 12 10 (H) 4 31 - 10 16 Thousand/uL    RBC 4 14 3 88 - 5 62 Million/uL    Hemoglobin 10 4 (L) 12 0 - 17 0 g/dL    Hematocrit 33 2 (L) 36 5 - 49 3 %    MCV 80 (L) 82 - 98 fL    MCH 25 1 (L) 26 8 - 34 3 pg    MCHC 31 3 (L) 31 4 - 37 4 g/dL    RDW 16 3 (H) 11 6 - 15 1 %    Platelets 134 918 - 545 Thousands/uL    MPV 11 8 8 9 - 12 7 fL   Comprehensive metabolic panel    Collection Time: 03/09/21  4:53 AM   Result Value Ref Range    Sodium 141 136 - 145 mmol/L    Potassium 3 8 3 5 - 5 3 mmol/L    Chloride 109 (H) 100 - 108 mmol/L    CO2 25 21 - 32 mmol/L    ANION GAP 7 4 - 13 mmol/L    BUN 20 5 - 25 mg/dL    Creatinine 0 69 0 60 - 1 30 mg/dL    Glucose 130 65 - 140 mg/dL    Calcium 7 6 (L) 8 3 - 10 1 mg/dL    Corrected Calcium 9 0 8 3 - 10 1 mg/dL    AST 76 (H) 5 - 45 U/L    ALT 81 (H) 12 - 78 U/L    Alkaline Phosphatase 55 46 - 116 U/L    Total Protein 5 9 (L) 6 4 - 8 2 g/dL    Albumin 2 3 (L) 3 5 - 5 0 g/dL    Total Bilirubin 0 64 0 20 - 1 00 mg/dL    eGFR 92 ml/min/1 73sq m   APTT    Collection Time: 03/09/21 10:52 AM   Result Value Ref Range    PTT 79 (H) 23 - 37 seconds   CBC and differential    Collection Time: 03/10/21  5:09 AM   Result Value Ref Range    WBC 14 01 (H) 4 31 - 10 16 Thousand/uL    RBC 4 58 3 88 - 5 62 Million/uL    Hemoglobin 11 5 (L) 12 0 - 17 0 g/dL    Hematocrit 37 1 36 5 - 49 3 %    MCV 81 (L) 82 - 98 fL    MCH 25 1 (L) 26 8 - 34 3 pg    MCHC 31 0 (L) 31 4 - 37 4 g/dL    RDW 16 3 (H) 11 6 - 15 1 %    MPV 11 8 8 9 - 12 7 fL    Platelets 176 546 - 786 Thousands/uL    nRBC 0 /100 WBCs   Comprehensive metabolic panel    Collection Time: 03/10/21  5:09 AM   Result Value Ref Range    Sodium 138 136 - 145 mmol/L    Potassium 4 6 3 5 - 5 3 mmol/L    Chloride 109 (H) 100 - 108 mmol/L    CO2 21 21 - 32 mmol/L    ANION GAP 8 4 - 13 mmol/L    BUN 18 5 - 25 mg/dL    Creatinine 0 75 0 60 - 1 30 mg/dL    Glucose 108 65 - 140 mg/dL    Calcium 8 1 (L) 8 3 - 10 1 mg/dL    Corrected Calcium 9 4 8 3 - 10 1 mg/dL     (H) 5 - 45 U/L     (H) 12 - 78 U/L    Alkaline Phosphatase 77 46 - 116 U/L    Total Protein 6 3 (L) 6 4 - 8 2 g/dL    Albumin 2 4 (L) 3 5 - 5 0 g/dL    Total Bilirubin 0 64 0 20 - 1 00 mg/dL    eGFR 88 ml/min/1 73sq m   Ferritin    Collection Time: 03/10/21  5:09 AM   Result Value Ref Range    Ferritin 257 8 - 388 ng/mL   C-reactive protein    Collection Time: 03/10/21  5:09 AM   Result Value Ref Range    CRP 77 5 (H) <3 0 mg/L Procalcitonin with AM Reflex    Collection Time: 03/10/21  5:09 AM   Result Value Ref Range    Procalcitonin 0 61 (H) <=0 25 ng/ml     Imaging: I have personally reviewed pertinent reports  Tele- afib    Counseling / Coordination of Care  Total time spent today 20 minutes  Greater than 50% of total time was spent with the patient and / or family counseling and / or coordination of care

## 2021-03-10 NOTE — PROGRESS NOTES
INTERNAL MEDICINE RESIDENCY PROGRESS NOTE     Name: Romel King   Age & Sex: 66 y o  male   MRN: [id number]  Unit/Bed#: -01   Encounter: [id number]  Team: SOD Team B     PATIENT INFORMATION     Name: Romel King   Age & Sex: 66 y o  male   MRN: [id number]  Hospital Stay Days: 3    ASSESSMENT/PLAN     Principal Problem:    Pneumonia due to COVID-19 virus  Active Problems:    Atrial fibrillation with RVR ([facility] [address] )    Acute respiratory failure with hypoxia (HCC)    Transaminitis    Lactic acidosis    Sepsis ([facility] [address] )      Sepsis ([facility] [address] )  Assessment & Plan  Secondary to COVID-19 pneumonia  Status post 2 L IV fluid boluses  Worsening respiratory status possibly due to volume overload secondary to IV fluid administration   Diuresis as described above  Management of COVID-19 as above    Lactic acidosis  Assessment & Plan  Down trending in the setting of sepsis secondary to COVID-19 pneumonia  Currently 2 5  Has received over 2 L of IV resuscitation fluids  Other possible etiologies include heart failure though he appears euvolemic on exam   Echocardiogram is pending  Continue to monitor patient clinically and recheck if he acutely worsens  Transaminitis  Assessment & Plan  AST ALT  Low 100s  Suspect drug-induced as he was recently started on antibiotics as well as Lipitor  Hold lipitor  Continue Tylenol p r n  Likely due to recent infection  Chronic hepatitis panel negative  Will continue to monitor  Continue to trend LFTs, consider RUQ US if liver function worsens tomorrow  Acute respiratory failure with hypoxia (HCC)  Assessment & Plan  Due to COVID pneumonia, possible heart failure, COVID cardiomyopathy?   Worsened overnight to 15 L  Chest x-ray appears grossly volume overloaded  Appears euvolemic on exam, no JVD seen  BNP however was elevated at 2900, no prior baseline  Echocardiogram ordered and pending  Continue to wean oxygen, currently on 12 L  Will initiate diuresis with Lasix 40 mg IV b i d  and monitor response  Critical care is aware of patient in case patient decompensates    Atrial fibrillation with RVR [facility])  Assessment & Plan  Patient with new onset AFib 1st noted in the ED with heart rates in 130s  Last EKG at [facility] showing right bundle branch block 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  Patient is not on anticoagulation at home although he was previously taking aspirin daily but stopped due to fatigue  CHADS-VASc 2 due to patient age  Although patient was hypertensive in the ED, review of prior records showsnormal blood pressures at previous office visits  Patient also does not have a documented history of CAD and never underwent catheterization  S/p cardizem drip in the ED without improvement in heart rates  Denies chest pain, orthopnea, leg swelling    - TSH within normal limits  - CTA no evidence of PE  - Possibly due to acute COVID infection vs prior MI  Plan:  - continue Lopressor 12 5  Q 6 hours scheduled  Withhold parameters  - rate currently controlled between 70s to [de-identified]  - cardiology following, appreciated, patient initiated on digoxin  - if patient has sustained AFib with RVR will consider starting patient on amiodarone given his hypotension  - patient changed over to Xarelto for anticoagulation  - closely monitor HR and blood pressures on telemetry    * Pneumonia due to COVID-19 virus  Assessment & Plan  Patient with approximately two weeks of shortness of breath, fevers, chills, myalgias, diarrhea, poor PO intake, fatigue  Tested positive for COVID on 3/1/21 but had been having symptoms before that  Diarrhea has resolved  Per daughter, patient appeared more confused today  Patient satting 88% on room air on arrival to ED with improvement to 96% on 4L nasal cannula     - Labs: AST 77, ALT 79, albumin 2 6, lactic acid 2 5, procalcitonin 1 41, d-dimer 4 58, blood cultures pending, procal 1 41  -D-dimer trending downward, BNP elevated at 2900, no prior baseline  - Chest x-ray in the ED showing diffuse patchy infiltrates  - CT head showing acute pansinusitis, patient asymptomatic  - CTA showed no evidence of pulmonary emboli but with extensive ground-glass infiltrates related to COVID pneumonia and trace pleural effusions  - no smoking or drug history    Plan:   - COVID moderate pathway  - continue remdesivir, Decadron, ceftriaxone, doxycycline, day 3  - trend inflammatory markers  - blood cultures negative for 24 hours  - chronic hepatitis panel negative  - anticoagulation with heparin drip  - wean O2 as tolerated, currently on 3 L  - aggressive pulmonary hygeine  - continue to monitor on telemetry      Family daughter was updated, all questions answered  Disposition:  Worsening oxygen requirements, continue to monitor on COVID moderate pathway  Likely will be inpatient until next week  SUBJECTIVE     Patient seen and examined  Overnight patient had worsening oxygen requirements  He also refused remdesivir as he stated it was making him feel worse  He kept stating that he is using not on medications and does not require them to recover from [place]  Educated patient that his worsening oxygen requirements could be worsening inspection and that he requires the medications we are giving him  He stated he understood and would comply  He denies any chest pain, states that his breathing has improved  He denies any nausea, vomiting, diarrhea  Tolerating p o  diet  Rest of ROS is negative  [employer] interpretor was used      OBJECTIVE     Vitals:    03/10/21 0647 03/10/21 0647 03/10/21 0818 03/10/21 1115   BP: 112/70 112/70  103/64   BP Location: Left arm      Pulse: 97 97  87   Resp: 22 22  (!) 36   Temp: 97 5 °F (36 4 °C)      TempSrc: Oral      SpO2: (!) 85% (!) 85% 97% 96%   Weight:       Height:          Temperature:   Temp (24hrs), Av °F (36 1 °C), Min:96 6 °F (35 9 °C), Max:97 8 °F (36 6 °C)    Temperature: 97 5 °F (36 4 °C)  Intake & Output:  I/O        0701 -  0700 03/09 0701 - 03/10 0700 03/10 0701 - 03/11 0700    P  O  480 600     I V  (mL/kg) 2460 (36 7) 4569 8 (68 2)     IV Piggyback       Total Intake(mL/kg) 2940 (43 9) 5169 8 (77 2)     Urine (mL/kg/hr) 1300 (0 8) 425 (0 3) 900 (3 1)    Stool 0 0     Total Output 1300 425 900    Net +1640 +4744 8 -900           Unmeasured Urine Occurrence   5 x    Unmeasured Stool Occurrence 1 x 2 x         Weights:   IBW: 68 4 kg    Body mass index is 22 46 kg/m²  Weight (last 2 days)     Date/Time   Weight    03/08/21 0300   67 (147 71)            Physical Exam  Constitutional:       General: He is not in acute distress  Appearance: Normal appearance  He is ill-appearing  HENT:      Head: Normocephalic and atraumatic  Mouth/Throat:      Mouth: Mucous membranes are moist    Eyes:      Extraocular Movements: Extraocular movements intact  Conjunctiva/sclera: Conjunctivae normal       Pupils: Pupils are equal, round, and reactive to light  Cardiovascular:      Rate and Rhythm: Normal rate and regular rhythm  Pulses: Normal pulses  Heart sounds: Normal heart sounds  No murmur  No friction rub  No gallop  Pulmonary:      Effort: Pulmonary effort is normal  No respiratory distress  Breath sounds: Normal breath sounds  No wheezing or rales  Abdominal:      General: Abdomen is flat  Bowel sounds are normal  There is no distension  Palpations: Abdomen is soft  Tenderness: There is no abdominal tenderness  There is no guarding  Musculoskeletal:      Right lower leg: No edema  Left lower leg: No edema  Skin:     General: Skin is warm and dry  Neurological:      General: No focal deficit present  Mental Status: He is alert and oriented to person, place, and time  Psychiatric:         Mood and Affect: Mood normal          Behavior: Behavior normal        LABORATORY DATA     Labs: I have personally reviewed pertinent reports    Results from last 7 days   Lab Units 03/10/21  8392 03/09/21  0453 03/08/21  0552 03/07/21  1810   WBC Thousand/uL 14 01* 12 10* 9 34 9 48   HEMOGLOBIN g/dL 11 5* 10 4* 11 8* 12 1   HEMATOCRIT % 37 1 33 2* 37 7 38 8   PLATELETS Thousands/uL 326 307 239 264   MONO PCT %  --   --   --  1*      Results from last 7 days   Lab Units 03/10/21  0509 03/09/21  0453 03/08/21  0552   POTASSIUM mmol/L 4 6 3 8 3 8   CHLORIDE mmol/L 109* 109* 107   CO2 mmol/L 21 25 23   BUN mg/dL 18 20 21   CREATININE mg/dL 0 75 0 69 0 69   CALCIUM mg/dL 8 1* 7 6* 7 9*   ALK PHOS U/L 77 55 49   ALT U/L 137* 81* 66   AST U/L 105* 76* 52*     Results from last 7 days   Lab Units 03/07/21  1810   MAGNESIUM mg/dL 2 4          Results from last 7 days   Lab Units 03/09/21  1052 03/09/21  0243 03/08/21  1819  03/07/21  1810   INR   --   --   --   --  1 23*   PTT seconds 79* 58* 54*   < > 32    < > = values in this interval not displayed  Results from last 7 days   Lab Units 03/09/21  0453   LACTIC ACID mmol/L 2 5*     Results from last 7 days   Lab Units 03/07/21  1814   TROPONIN I ng/mL <0 02       IMAGING & DIAGNOSTIC TESTING     Radiology Results: I have personally reviewed pertinent reports  Xr Chest Portable    Result Date: 3/10/2021  Impression: Worsening of bilateral pulmonary infiltrates Workstation performed: [id number]     Xr Chest 1 View Portable    Result Date: 3/8/2021  Impression: Diffuse patchy groundglass opacity throughout the lung fields  No effusions or pneumothorax  In the setting of clinically suspected/proven COVID-19, this plain film appearance while nonspecific, can be seen in cases of viral pneumonia such as COVID-19  Workstation performed: [id number]     Ct Head Without Contrast    Result Date: 3/7/2021  Impression: Acute pansinusitis  No sign of an acute transcortical infarction  No acute intracranial hemorrhage  Workstation performed: [id number]     Cta Ed Chest Pe Study    Result Date: 3/7/2021  Impression: No evidence of pulmonary emboli   Extensive groundglass infiltrates related to Covid pneumonia  Trace pleural effusions  Mild mediastinal and hilar adenopathy Workstation performed: [id number]     Other Diagnostic Testing: I have personally reviewed pertinent reports  ACTIVE MEDICATIONS     Current Facility-Administered Medications   Medication Dose Route Frequency    acetaminophen (TYLENOL) tablet 650 mg  650 mg Oral Q6H PRN    ascorbic acid (VITAMIN C) tablet 1,000 mg  1,000 mg Oral Q12H [facility]    ceftriaxone (ROCEPHIN) 1 g/50 mL in dextrose IVPB  1,000 mg Intravenous Q24H    cholecalciferol (VITAMIN D3) tablet 2,000 Units  2,000 Units Oral Daily    dexamethasone (DECADRON) injection 6 mg  6 mg Intravenous Q24H    digoxin (LANOXIN) tablet 125 mcg  125 mcg Oral Daily    docusate sodium (COLACE) capsule 100 mg  100 mg Oral BID    doxycycline hyclate (VIBRAMYCIN) capsule 100 mg  100 mg Oral Q12H    famotidine (PEPCID) tablet 20 mg  20 mg Oral BID    furosemide (LASIX) injection 40 mg  40 mg Intravenous BID (diuretic)    melatonin tablet 3 mg  3 mg Oral HS    metoprolol tartrate (LOPRESSOR) partial tablet 12 5 mg  12 5 mg Oral Q6H    zinc sulfate (ZINCATE) capsule 220 mg  220 mg Oral Daily    Followed by   [place] [facility] ON 3/15/2021] multivitamin-minerals (CENTRUM ADULTS) tablet 1 tablet  1 tablet Oral Daily    ondansetron (ZOFRAN) injection 4 mg  4 mg Intravenous Q6H PRN    remdesivir (Veklury) 100 mg in sodium chloride 0 9 % 250 mL IVPB  100 mg Intravenous Q24H    rivaroxaban (XARELTO) tablet 20 mg  20 mg Oral Daily With Breakfast    senna (SENOKOT) tablet 8 6 mg  1 tablet Oral Daily       VTE Pharmacologic Prophylaxis: Reason for no pharmacologic prophylaxis Xarelto  VTE Mechanical Prophylaxis: sequential compression device    Portions of the record may have been created with voice recognition software  Occasional wrong word or "sound a like" substitutions may have occurred due to the inherent limitations of voice recognition software    Read the chart carefully and recognize, using context, where substitutions have occurred   ==  Brien Delcid, [address] [facility]  Internal Medicine Residency PGY-2

## 2021-03-10 NOTE — ASSESSMENT & PLAN NOTE
AST ALT  Low 100s  Suspect drug-induced as he was recently started on antibiotics as well as Lipitor  Hold lipitor  Continue Tylenol p r n  Likely due to recent infection  Chronic hepatitis panel negative  Will continue to monitor  Continue to trend LFTs, consider RUQ US if liver function worsens tomorrow

## 2021-03-10 NOTE — ASSESSMENT & PLAN NOTE
Down trending in the setting of sepsis secondary to COVID-19 pneumonia  Currently 2 5  Has received over 2 L of IV resuscitation fluids  Other possible etiologies include heart failure though he appears euvolemic on exam   Echocardiogram is pending  Continue to monitor patient clinically and recheck if he acutely worsens

## 2021-03-10 NOTE — QUICK NOTE
QUICK NOTE - Deterioration Index  Mireya Sparks 66 y o  male MRN: [id number]  Unit/Bed#: -01 Encounter: [id number]    Date Paged: 03/10/21  Time Paged: [phone]  Room #: 0  Arrival Time: 1200  Deterioration index score at time of page: 65 2  %  Spoke with Dr Rosalie Elizabeth from primary team  Need to escalate level of care: no     PROBLEMS resulting in high DI score:    RR 39   Age 66   In atrial Fibrillation    K 4 6    PLAN:     RR spiked while patient was getting OOB to chair, RR now 22   Patient was weaned from 15 to 12L midflow, subjectively feels improved   Please alert us if clinical status changes    Please contact critical care via [employer] with any questions or concerns       Vitals:   Vitals:    03/10/21 0647 03/10/21 0647 03/10/21 0818 03/10/21 1115   BP: 112/70 112/70  103/64   BP Location: Left arm      Pulse: 97 97  87   Resp: 22 22  (!) 36   Temp: 97 5 °F (36 4 °C)      TempSrc: Oral      SpO2: (!) 85% (!) 85% 97% 96%   Weight:       Height:           Respiratory:  SpO2: SpO2: 96 %, SpO2 Activity: SpO2 Activity: At Rest, SpO2 Device: O2 Device: Mid flow nasal cannula  Nasal Cannula O2 Flow Rate (L/min): 12 L/min    Temperature: Temp (24hrs), Av °F (36 1 °C), Min:96 6 °F (35 9 °C), Max:97 8 °F (36 6 °C)  Current: Temperature: 97 5 °F (36 4 °C)    Labs:   Results from last 7 days   Lab Units 03/10/21  0509 21  0453 21  0552 21  1810   WBC Thousand/uL 14 01* 12 10* 9 34 9 48   HEMOGLOBIN g/dL 11 5* 10 4* 11 8* 12 1   HEMATOCRIT % 37 1 33 2* 37 7 38 8   PLATELETS Thousands/uL 326 307 239 264   MONO PCT %  --   --   --  1*     Results from last 7 days   Lab Units 03/10/21  0509 21  0453 21  0552   SODIUM mmol/L 138 141 139   POTASSIUM mmol/L 4 6 3 8 3 8   CHLORIDE mmol/L 109* 109* 107   CO2 mmol/L 21 25 23   BUN mg/dL 18 20 21   CREATININE mg/dL 0 75 0 69 0 69   CALCIUM mg/dL 8 1* 7 6* 7 9*   ALK PHOS U/L 77 55 49   ALT U/L 137* 81* 66   AST U/L 105* 76* 52* Results from last 7 days   Lab Units 03/07/21  1810   MAGNESIUM mg/dL 2 4     Results from last 7 days   Lab Units 03/09/21  0453 03/09/21  0155 03/08/21  2155 03/08/21  1819 03/08/21  1112   LACTIC ACID mmol/L 2 5* 2 6* 3 1* 3 5* 3 3*     Results from last 7 days   Lab Units 03/07/21  1814   TROPONIN I ng/mL <0 02     Results from last 7 days   Lab Units 03/10/21  0509 03/09/21  0453 03/08/21  0552 03/07/21  1810   PROCALCITONIN ng/ml 0 61* 0 91* 1 18* 1 41*       Code Status: Level 1 - Full Code

## 2021-03-10 NOTE — ASSESSMENT & PLAN NOTE
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  Patient is not on anticoagulation at home although he was previously taking aspirin daily but stopped due to fatigue  CHADS-VASc 2 due to patient age  Although patient was hypertensive in the ED, review of prior records showsnormal blood pressures at previous office visits  Patient also does not have a documented history of CAD and never underwent catheterization  S/p cardizem drip in the ED without improvement in heart rates  Denies chest pain, orthopnea, leg swelling    - TSH within normal limits  - CTA no evidence of PE  - Possibly due to acute COVID infection vs prior MI       Plan:  - continue Lopressor 12 5  Q 6 hours scheduled  Withhold parameters  - rate currently controlled between 70s to [de-identified]  - cardiology following, appreciated, patient initiated on digoxin  - if patient has sustained AFib with RVR will consider starting patient on amiodarone given his hypotension  - patient changed over to Xarelto for anticoagulation  - closely monitor HR and blood pressures on telemetry

## 2021-03-11 LAB
ALBUMIN SERPL BCP-MCNC: 2.4 G/DL (ref 3.5–5)
ALP SERPL-CCNC: 92 U/L (ref 46–116)
ALT SERPL W P-5'-P-CCNC: 207 U/L (ref 12–78)
ANION GAP SERPL CALCULATED.3IONS-SCNC: 5 MMOL/L (ref 4–13)
AST SERPL W P-5'-P-CCNC: 148 U/L (ref 5–45)
BILIRUB SERPL-MCNC: 0.68 MG/DL (ref 0.2–1)
BUN SERPL-MCNC: 20 MG/DL (ref 5–25)
CALCIUM ALBUM COR SERPL-MCNC: 9.5 MG/DL (ref 8.3–10.1)
CALCIUM SERPL-MCNC: 8.2 MG/DL (ref 8.3–10.1)
CHLORIDE SERPL-SCNC: 107 MMOL/L (ref 100–108)
CO2 SERPL-SCNC: 25 MMOL/L (ref 21–32)
CREAT SERPL-MCNC: 0.78 MG/DL (ref 0.6–1.3)
ERYTHROCYTE [DISTWIDTH] IN BLOOD BY AUTOMATED COUNT: 16.2 % (ref 11.6–15.1)
GFR SERPL CREATININE-BSD FRML MDRD: 87 ML/MIN/1.73SQ M
GLUCOSE SERPL-MCNC: 123 MG/DL (ref 65–140)
HCT VFR BLD AUTO: 38.4 % (ref 36.5–49.3)
HGB BLD-MCNC: 11.9 G/DL (ref 12–17)
MCH RBC QN AUTO: 24.8 PG (ref 26.8–34.3)
MCHC RBC AUTO-ENTMCNC: 31 G/DL (ref 31.4–37.4)
MCV RBC AUTO: 80 FL (ref 82–98)
PLATELET # BLD AUTO: 230 THOUSANDS/UL (ref 149–390)
PMV BLD AUTO: 10.9 FL (ref 8.9–12.7)
POTASSIUM SERPL-SCNC: 4.4 MMOL/L (ref 3.5–5.3)
PROCALCITONIN SERPL-MCNC: 0.25 NG/ML
PROT SERPL-MCNC: 6.3 G/DL (ref 6.4–8.2)
RBC # BLD AUTO: 4.79 MILLION/UL (ref 3.88–5.62)
SODIUM SERPL-SCNC: 137 MMOL/L (ref 136–145)
WBC # BLD AUTO: 10 THOUSAND/UL (ref 4.31–10.16)

## 2021-03-11 PROCEDURE — 94760 N-INVAS EAR/PLS OXIMETRY 1: CPT

## 2021-03-11 PROCEDURE — 94760 N-INVAS EAR/PLS OXIMETRY 1: CPT | Performed by: SOCIAL WORKER

## 2021-03-11 PROCEDURE — 93325 DOPPLER ECHO COLOR FLOW MAPG: CPT | Performed by: INTERNAL MEDICINE

## 2021-03-11 PROCEDURE — 85027 COMPLETE CBC AUTOMATED: CPT | Performed by: STUDENT IN AN ORGANIZED HEALTH CARE EDUCATION/TRAINING PROGRAM

## 2021-03-11 PROCEDURE — 99231 SBSQ HOSP IP/OBS SF/LOW 25: CPT | Performed by: INTERNAL MEDICINE

## 2021-03-11 PROCEDURE — 93308 TTE F-UP OR LMTD: CPT | Performed by: INTERNAL MEDICINE

## 2021-03-11 PROCEDURE — 93321 DOPPLER ECHO F-UP/LMTD STD: CPT | Performed by: INTERNAL MEDICINE

## 2021-03-11 PROCEDURE — 80053 COMPREHEN METABOLIC PANEL: CPT | Performed by: STUDENT IN AN ORGANIZED HEALTH CARE EDUCATION/TRAINING PROGRAM

## 2021-03-11 PROCEDURE — 84145 PROCALCITONIN (PCT): CPT | Performed by: STUDENT IN AN ORGANIZED HEALTH CARE EDUCATION/TRAINING PROGRAM

## 2021-03-11 RX ORDER — FUROSEMIDE 10 MG/ML
20 INJECTION INTRAMUSCULAR; INTRAVENOUS
Status: DISCONTINUED | OUTPATIENT
Start: 2021-03-11 | End: 2021-03-11

## 2021-03-11 RX ORDER — FUROSEMIDE 10 MG/ML
40 INJECTION INTRAMUSCULAR; INTRAVENOUS
Status: COMPLETED | OUTPATIENT
Start: 2021-03-11 | End: 2021-03-12

## 2021-03-11 RX ADMIN — ZINC SULFATE 220 MG (50 MG) CAPSULE 220 MG: CAPSULE at 08:19

## 2021-03-11 RX ADMIN — OXYCODONE HYDROCHLORIDE AND ACETAMINOPHEN 1000 MG: 500 TABLET ORAL at 21:11

## 2021-03-11 RX ADMIN — MELATONIN 3 MG: at 21:11

## 2021-03-11 RX ADMIN — FUROSEMIDE 40 MG: 10 INJECTION, SOLUTION INTRAMUSCULAR; INTRAVENOUS at 08:19

## 2021-03-11 RX ADMIN — DEXAMETHASONE SODIUM PHOSPHATE 6 MG: 4 INJECTION INTRA-ARTICULAR; INTRALESIONAL; INTRAMUSCULAR; INTRAVENOUS; SOFT TISSUE at 21:12

## 2021-03-11 RX ADMIN — STANDARDIZED SENNA CONCENTRATE 8.6 MG: 8.6 TABLET ORAL at 08:19

## 2021-03-11 RX ADMIN — Medication 2000 UNITS: at 08:19

## 2021-03-11 RX ADMIN — FAMOTIDINE 20 MG: 20 TABLET ORAL at 17:34

## 2021-03-11 RX ADMIN — DOXYCYCLINE 100 MG: 100 CAPSULE ORAL at 17:34

## 2021-03-11 RX ADMIN — METOPROLOL TARTRATE 25 MG: 25 TABLET, FILM COATED ORAL at 21:11

## 2021-03-11 RX ADMIN — CEFTRIAXONE 1000 MG: 2 INJECTION, POWDER, FOR SOLUTION INTRAMUSCULAR; INTRAVENOUS at 17:40

## 2021-03-11 RX ADMIN — DOCUSATE SODIUM 100 MG: 100 CAPSULE, LIQUID FILLED ORAL at 08:19

## 2021-03-11 RX ADMIN — DOXYCYCLINE 100 MG: 100 CAPSULE ORAL at 05:52

## 2021-03-11 RX ADMIN — DOCUSATE SODIUM 100 MG: 100 CAPSULE, LIQUID FILLED ORAL at 17:34

## 2021-03-11 RX ADMIN — Medication 12.5 MG: at 08:19

## 2021-03-11 RX ADMIN — FUROSEMIDE 40 MG: 10 INJECTION, SOLUTION INTRAMUSCULAR; INTRAVENOUS at 17:34

## 2021-03-11 RX ADMIN — RIVAROXABAN 20 MG: 20 TABLET, FILM COATED ORAL at 08:23

## 2021-03-11 RX ADMIN — DIGOXIN 125 MCG: 125 TABLET ORAL at 08:24

## 2021-03-11 RX ADMIN — REMDESIVIR 100 MG: 100 INJECTION, POWDER, LYOPHILIZED, FOR SOLUTION INTRAVENOUS at 00:43

## 2021-03-11 RX ADMIN — OXYCODONE HYDROCHLORIDE AND ACETAMINOPHEN 1000 MG: 500 TABLET ORAL at 08:19

## 2021-03-11 RX ADMIN — FAMOTIDINE 20 MG: 20 TABLET ORAL at 08:19

## 2021-03-11 NOTE — ASSESSMENT & PLAN NOTE
Down trending in the setting of sepsis secondary to COVID-19 pneumonia  Last checked 2 5  Has received over 2 L of IV resuscitation fluids  Other possible etiologies include heart failure though he appears euvolemic on exam     Echocardiogram is pending, will await results  Continue to monitor patient clinically and recheck if he acutely worsens

## 2021-03-11 NOTE — ASSESSMENT & PLAN NOTE
AST ALT  Low 100s  Suspect drug-induced as he was recently started on antibiotics as well as Lipitor  Lipitor discontinued, given that it takes 3 days to be eliminated completely will monitor Liver function for now  Continue Tylenol p r n  Chronic hepatitis panel negative  Will continue to monitor  Continue to trend LFTs, will consider further workup or alternative abx if liver enzymes continue to increase

## 2021-03-11 NOTE — PROGRESS NOTES
INTERNAL MEDICINE RESIDENCY PROGRESS NOTE     Name: Alf Higgins   Age & Sex: 66 y o  male   MRN: [id number]  Unit/Bed#: -01   Encounter: [id number]  Team: SOD Team B     PATIENT INFORMATION     Name: Alf Higgins   Age & Sex: 66 y o  male   MRN: [id number]  Hospital Stay Days: 4    ASSESSMENT/PLAN     Principal Problem:    Pneumonia due to COVID-19 virus  Active Problems:    Atrial fibrillation with RVR ([facility] [address] )    Acute respiratory failure with hypoxia (HCC)    Transaminitis    Lactic acidosis    Sepsis ([facility] [address] )      Sepsis ([facility] [address] )  Assessment & Plan  Secondary to COVID-19 pneumonia  Status post 2 L IV fluid boluses, no being diuresed for possible overload  Worsening respiratory status possibly due to volume overload secondary to IV fluid administration   Diuresis as described above  Management of COVID-19 as above    Lactic acidosis  Assessment & Plan  Down trending in the setting of sepsis secondary to COVID-19 pneumonia  Last checked 2 5  Has received over 2 L of IV resuscitation fluids  Other possible etiologies include heart failure though he appears euvolemic on exam     Echocardiogram is pending, will await results  Continue to monitor patient clinically and recheck if he acutely worsens  Transaminitis  Assessment & Plan  AST ALT  Low 100s  Suspect drug-induced as he was recently started on antibiotics as well as Lipitor  Lipitor discontinued, given that it takes 3 days to be eliminated completely will monitor Liver function for now  Continue Tylenol p r n  Chronic hepatitis panel negative  Will continue to monitor  Continue to trend LFTs, will consider further workup or alternative abx if liver enzymes continue to increase      Acute respiratory failure with hypoxia (HCC)  Assessment & Plan  Due to COVID pneumonia, possible heart failure  Worsened overnight to 15 L  Chest x-ray appears grossly volume overloaded  Appears euvolemic on exam  BNP however was elevated at 2900, no prior baseline  Echocardiogram ordered and pending  Continue to wean oxygen, currently on 10  Will continue diuresis with Lasix 40 mg IV b i d  and monitor response  Critical care is aware of patient in case patient decompensates    Atrial fibrillation with RVR [facility])  Assessment & Plan  Patient with new onset AFib 1st noted in the ED with heart rates in 130s  Last EKG at [facility] showing right bundle branch block 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  Patient is not on anticoagulation at home although he was previously taking aspirin daily but stopped due to fatigue  CHADS-VASc 2 due to patient age  Although patient was hypertensive in the ED, review of prior records showsnormal blood pressures at previous office visits  Patient also does not have a documented history of CAD and never underwent catheterization  S/p cardizem drip in the ED without improvement in heart rates  Denies chest pain, orthopnea, leg swelling    - TSH within normal limits  - CTA no evidence of PE  - Possibly due to acute COVID infection vs prior MI  Plan:  - continue Lopressor 12 5  Q 6 hours scheduled  Withhold parameters  - rate currently controlled between 70s to [de-identified]  - cardiology following, appreciated, patient initiated on digoxin, continue  - if patient has sustained AFib with RVR will consider starting patient on amiodarone given his hypotension  - Continue Xarelto  - closely monitor HR and blood pressures on telemetry    * Pneumonia due to COVID-19 virus  Assessment & Plan  Patient with approximately two weeks of shortness of breath, fevers, chills, myalgias, diarrhea, poor PO intake, fatigue  Tested positive for COVID on 3/1/21 but had been having symptoms before that  Diarrhea has resolved  Per daughter, patient appeared more confused today  Patient satting 88% on room air on arrival to ED with improvement to 96% on 4L nasal cannula     - Labs: AST 77, ALT 79, albumin 2 6, lactic acid 2 5, procalcitonin 1 41, d-dimer 4 58, blood cultures pending, procal 1 41  -D-dimer trending downward, BNP elevated at 2900, no prior baseline  - Chest x-ray in the ED showing diffuse patchy infiltrates  - CT head showing acute pansinusitis, patient asymptomatic  - CTA showed no evidence of pulmonary emboli but with extensive ground-glass infiltrates related to COVID pneumonia and trace pleural effusions  - no smoking or drug history    Plan:   - COVID moderate pathway  - continue remdesivir, Decadron, ceftriaxone, doxycycline, day 4, would complete 7 day course for PNA  - trend inflammatory markers, procalc now normalized  - blood cultures negative for 48 hours  - chronic hepatitis panel negative  - anticoagulation with heparin drip  - wean O2 as tolerated, currently on 10 L   - aggressive pulmonary hygeine  - continue to monitor on telemetry        Disposition:  Continue inpatient management    SUBJECTIVE     Patient seen and examined  No acute events overnight  Patient reports that his breathing is better  He denies any chest pain  Reports that he is hungry  He denies any nausea, vomiting, diarrhea  States that he is urinating more frequently due to Lasix  Rest of ROS is negative  [employer] Interpretor was used  OBJECTIVE     Vitals:    21 0348 21 0656 21 0830 21 0832   BP: 110/63 113/68     Pulse: 71 73     Resp:       Temp:  (!) 97 4 °F (36 3 °C)     TempSrc:  Axillary     SpO2: (!) 87% 95% 93% 93%   Weight:       Height:          Temperature:   Temp (24hrs), Av 8 °F (36 6 °C), Min:97 4 °F (36 3 °C), Max:98 1 °F (36 7 °C)    Temperature: (!) 97 4 °F (36 3 °C)  Intake & Output:  I/O        07 - 03/10 0700 03/10 07 -  0700  07 -  0700    P  O  600 580 180    I V  (mL/kg) 4569 8 (68 2)      Total Intake(mL/kg) 5169 8 (77 2) 580 (8 7) 180 (2 7)    Urine (mL/kg/hr) 425 (0 3) 3255 (2) 850 (3 1)    Stool 0 0     Total Output 425 3255 850    Net +4744 8 -2675 -670           Unmeasured Urine Occurrence  5 x Unmeasured Stool Occurrence 2 x 1 x         Weights:   IBW: 68 4 kg    Body mass index is 22 46 kg/m²  Weight (last 2 days)     None        Physical Exam  Constitutional:       General: He is not in acute distress  Appearance: Normal appearance  He is ill-appearing  Interventions: Nasal cannula in place  HENT:      Head: Normocephalic and atraumatic  Mouth/Throat:      Mouth: Mucous membranes are moist    Eyes:      Extraocular Movements: Extraocular movements intact  Conjunctiva/sclera: Conjunctivae normal       Pupils: Pupils are equal, round, and reactive to light  Cardiovascular:      Rate and Rhythm: Normal rate and regular rhythm  Pulses: Normal pulses  Heart sounds: Normal heart sounds  No murmur  No friction rub  No gallop  Pulmonary:      Effort: Pulmonary effort is normal  No respiratory distress  Breath sounds: Normal breath sounds  No wheezing or rales  Abdominal:      General: Abdomen is flat  Bowel sounds are normal  There is no distension  Palpations: Abdomen is soft  Tenderness: There is no abdominal tenderness  There is no guarding  Musculoskeletal:      Right lower leg: No edema  Left lower leg: No edema  Skin:     General: Skin is warm and dry  Neurological:      General: No focal deficit present  Mental Status: He is alert and oriented to person, place, and time  Psychiatric:         Mood and Affect: Mood normal          Behavior: Behavior normal         LABORATORY DATA     Labs: I have personally reviewed pertinent reports  Results from last 7 days   Lab Units 03/11/21  0451 03/10/21  0509 03/09/21  0453  03/07/21  1810   WBC Thousand/uL 10 00 14 01* 12 10*   < > 9 48   HEMOGLOBIN g/dL 11 9* 11 5* 10 4*   < > 12 1   HEMATOCRIT % 38 4 37 1 33 2*   < > 38 8   PLATELETS Thousands/uL 230 326 307   < > 264   MONO PCT %  --   --   --   --  1*    < > = values in this interval not displayed        Results from last 7 days   Lab Units 03/11/21  0451 03/10/21  0509 03/09/21  0453   POTASSIUM mmol/L 4 4 4 6 3 8   CHLORIDE mmol/L 107 109* 109*   CO2 mmol/L 25 21 25   BUN mg/dL 20 18 20   CREATININE mg/dL 0 78 0 75 0 69   CALCIUM mg/dL 8 2* 8 1* 7 6*   ALK PHOS U/L 92 77 55   ALT U/L 207* 137* 81*   AST U/L 148* 105* 76*     Results from last 7 days   Lab Units 03/07/21  1810   MAGNESIUM mg/dL 2 4          Results from last 7 days   Lab Units 03/09/21  1052 03/09/21  0243 03/08/21  1819  03/07/21  1810   INR   --   --   --   --  1 23*   PTT seconds 79* 58* 54*   < > 32    < > = values in this interval not displayed  Results from last 7 days   Lab Units 03/09/21  0453   LACTIC ACID mmol/L 2 5*     Results from last 7 days   Lab Units 03/07/21  1814   TROPONIN I ng/mL <0 02       IMAGING & DIAGNOSTIC TESTING     Radiology Results: I have personally reviewed pertinent reports  Xr Chest Portable    Result Date: 3/10/2021  Impression: Worsening of bilateral pulmonary infiltrates Workstation performed: [id number]     Xr Chest 1 View Portable    Result Date: 3/8/2021  Impression: Diffuse patchy groundglass opacity throughout the lung fields  No effusions or pneumothorax  In the setting of clinically suspected/proven COVID-19, this plain film appearance while nonspecific, can be seen in cases of viral pneumonia such as COVID-19  Workstation performed: [id number]     Ct Head Without Contrast    Result Date: 3/7/2021  Impression: Acute pansinusitis  No sign of an acute transcortical infarction  No acute intracranial hemorrhage  Workstation performed: [id number]     Cta Ed Chest Pe Study    Result Date: 3/7/2021  Impression: No evidence of pulmonary emboli  Extensive groundglass infiltrates related to Covid pneumonia  Trace pleural effusions  Mild mediastinal and hilar adenopathy Workstation performed: [id number]     Other Diagnostic Testing: I have personally reviewed pertinent reports      ACTIVE MEDICATIONS     Current Facility-Administered Medications   Medication Dose Route Frequency    acetaminophen (TYLENOL) tablet 650 mg  650 mg Oral Q6H PRN    ascorbic acid (VITAMIN C) tablet 1,000 mg  1,000 mg Oral Q12H LEIE    ceftriaxone (ROCEPHIN) 1 g/50 mL in dextrose IVPB  1,000 mg Intravenous Q24H    cholecalciferol (VITAMIN D3) tablet 2,000 Units  2,000 Units Oral Daily    dexamethasone (DECADRON) injection 6 mg  6 mg Intravenous Q24H    digoxin (LANOXIN) tablet 125 mcg  125 mcg Oral Daily    docusate sodium (COLACE) capsule 100 mg  100 mg Oral BID    doxycycline hyclate (VIBRAMYCIN) capsule 100 mg  100 mg Oral Q12H    famotidine (PEPCID) tablet 20 mg  20 mg Oral BID    furosemide (LASIX) injection 40 mg  40 mg Intravenous BID (diuretic)    melatonin tablet 3 mg  3 mg Oral HS    metoprolol tartrate (LOPRESSOR) tablet 25 mg  25 mg Oral Q12H ELIE    zinc sulfate (ZINCATE) capsule 220 mg  220 mg Oral Daily    Followed by   Devere Agent ON 3/15/2021] multivitamin-minerals (CENTRUM ADULTS) tablet 1 tablet  1 tablet Oral Daily    ondansetron (ZOFRAN) injection 4 mg  4 mg Intravenous Q6H PRN    remdesivir (Veklury) 100 mg in sodium chloride 0 9 % 250 mL IVPB  100 mg Intravenous Q24H    rivaroxaban (XARELTO) tablet 20 mg  20 mg Oral Daily With Breakfast    senna (SENOKOT) tablet 8 6 mg  1 tablet Oral Daily       VTE Pharmacologic Prophylaxis: Reason for no pharmacologic prophylaxis Xarelto  VTE Mechanical Prophylaxis: sequential compression device    Portions of the record may have been created with voice recognition software  Occasional wrong word or "sound a like" substitutions may have occurred due to the inherent limitations of voice recognition software    Read the chart carefully and recognize, using context, where substitutions have occurred   ==  Anup Torres, [address] [facility]  Internal Medicine Residency PGY-2

## 2021-03-11 NOTE — ASSESSMENT & PLAN NOTE
Due to COVID pneumonia, possible heart failure  Worsened overnight to 15 L  Chest x-ray appears grossly volume overloaded  Appears euvolemic on exam  BNP however was elevated at 2900, no prior baseline  Echocardiogram ordered and pending  Continue to wean oxygen, currently on 10  Will continue diuresis with Lasix 40 mg IV b i d  and monitor response  Critical care is aware of patient in case patient decompensates

## 2021-03-11 NOTE — RESPIRATORY THERAPY NOTE
resp care      03/11/21 0832   Respiratory Assessment   Resp Comments Midflow decreased to 8lpm  Sat 93% on 10     Additional Assessments   SpO2 93 %

## 2021-03-11 NOTE — PROGRESS NOTES
Progress Note - Cardiology   [facility] 66 y o  male MRN: [id number]  Unit/Bed#: -01 Encounter: [id number]        Principal Problem:    Pneumonia due to COVID-19 virus  Active Problems:    Atrial fibrillation with RVR ([address] )    Acute respiratory failure with hypoxia (HCC)    Transaminitis    Lactic acidosis    Sepsis ([facility] [address] )      Assessment/Plan     1  Atrial fibrillation with RVR- new diagnosis  Presented with afib with RVR  EKG Sept 2020- NSR RBBB  IV Cardizem has been stopped 3/9 and Evelyne Jackson has been started on Lopressor 12 5 b i d  Up titrated to every 6 hours as BP's have improved yesterday  One dose held last evening  Will change to 25mg BID  CHADS2-VASc= 2 for age   As well is in hypercoagulable state with COVID   Xarelto 20mg started  We loaded with 1 gram digoxin and daily 125mcg daily   HR improved but still with pds RVR  Limited TTE results pending   TSH- 1 4     2  Acute hypoxic respiratory failure  CT of the chest no PE   Extensive ground-glass infiltrates related to COVID pneumonia  ProBNP 2, 948 (no baseline)  Troponin negative x1  With increasing oxygen requirements over night 2 nights ago IVF stopped ( had been receiving fluids for lactic acidosis, had a few liters)  and diuretics started  Now down to 8 liters  Per RN few crackles, improved  Net negative 2175, continue diuretic     3  COVID-19-on moderate pathway  remdesivir, Decadron, ceftriaxone and doxycycline  Tested positive March 1, 2021  Mild hypoxia on presentation  Worsening oxygenation over night last PM   Suspected component ov volume overload  Had been receiving fluid resusitation  for lactic acidosis  Elevated inflammatory markers  CT of the chest extensive ground-glass infiltrates    Subjective/Objective   Chief Complaint/subjective  Spoke with primary team yesterday and will touch base again today  Spoke with patient's RN  Crackles are better  Down to 8 L from 13 yesterday  Patient up and down to the bathroom      I did not enter patient's room due to COVID isolation        Vitals: /68   Pulse 73   Temp (!) 97 4 °F (36 3 °C) (Axillary)   Resp 20   Ht 5' 8" (1 727 m)   Wt 67 kg (147 lb 11 3 oz)   SpO2 93%   BMI 22 46 kg/m²     Vitals:    03/07/21 1953 03/08/21 0300   Weight: 67 kg (147 lb 11 3 oz) 67 kg (147 lb 11 3 oz)     Orthostatic Blood Pressures      Most Recent Value   Blood Pressure  113/68 filed at 03/11/2021 0656   Patient Position - Orthostatic VS  Lying filed at 03/10/2021 1523            Intake/Output Summary (Last 24 hours) at 3/11/2021 0852  Last data filed at 3/11/2021 0601  Gross per 24 hour   Intake 580 ml   Output 2755 ml   Net -2175 ml       Invasive Devices     Peripheral Intravenous Line            Peripheral IV 03/08/21 Left Forearm 3 days                Current Facility-Administered Medications   Medication Dose Route Frequency    acetaminophen (TYLENOL) tablet 650 mg  650 mg Oral Q6H PRN    ascorbic acid (VITAMIN C) tablet 1,000 mg  1,000 mg Oral Q12H [address]    ceftriaxone (ROCEPHIN) 1 g/50 mL in dextrose IVPB  1,000 mg Intravenous Q24H    cholecalciferol (VITAMIN D3) tablet 2,000 Units  2,000 Units Oral Daily    dexamethasone (DECADRON) injection 6 mg  6 mg Intravenous Q24H    digoxin (LANOXIN) tablet 125 mcg  125 mcg Oral Daily    docusate sodium (COLACE) capsule 100 mg  100 mg Oral BID    doxycycline hyclate (VIBRAMYCIN) capsule 100 mg  100 mg Oral Q12H    famotidine (PEPCID) tablet 20 mg  20 mg Oral BID    furosemide (LASIX) injection 40 mg  40 mg Intravenous BID (diuretic)    melatonin tablet 3 mg  3 mg Oral HS    metoprolol tartrate (LOPRESSOR) partial tablet 12 5 mg  12 5 mg Oral Q6H    zinc sulfate (ZINCATE) capsule 220 mg  220 mg Oral Daily    Followed by   Shanna Ugarte ON 3/15/2021] multivitamin-minerals (CENTRUM ADULTS) tablet 1 tablet  1 tablet Oral Daily    ondansetron (ZOFRAN) injection 4 mg  4 mg Intravenous Q6H PRN    remdesivir (Veklury) 100 mg in sodium chloride 0 9 % 250 mL IVPB  100 mg Intravenous Q24H    rivaroxaban (XARELTO) tablet 20 mg  20 mg Oral Daily With Breakfast    senna (SENOKOT) tablet 8 6 mg  1 tablet Oral Daily         Physical Exam: /68   Pulse 73   Temp (!) 97 4 °F (36 3 °C) (Axillary)   Resp 20   Ht 5' 8" (1 727 m)   Wt 67 kg (147 lb 11 3 oz)   SpO2 93%   BMI 22 46 kg/m²     I did not enter patient's room  Spoke with RN  Per documentation  The patient from the window  General Appearance:    Alert, appears in no acute distress   Head:    Normocephalic                       Lungs:     Appears nonlabored  Few crackles at the base according to the patient's RN on 8 L           Heart:    Irregular rhythm   Abdomen:     Soft, non-tender, per documentation   Extremities:   No edema per reports           Neurologic:   Alert and oriented to person place and time, no focal deficits                 Lab Results:   Recent Results (from the past 72 hour(s))   Lactic acid 2 Hours    Collection Time: 03/08/21 11:12 AM   Result Value Ref Range    LACTIC ACID 3 3 (HH) 0 5 - 2 0 mmol/L   APTT    Collection Time: 03/08/21 11:12 AM   Result Value Ref Range    PTT 52 (H) 23 - 37 seconds   APTT    Collection Time: 03/08/21  6:19 PM   Result Value Ref Range    PTT 54 (H) 23 - 37 seconds   Lactic acid, plasma    Collection Time: 03/08/21  6:19 PM   Result Value Ref Range    LACTIC ACID 3 5 (HH) 0 5 - 2 0 mmol/L   Lactic acid 2 Hours    Collection Time: 03/08/21  9:55 PM   Result Value Ref Range    LACTIC ACID 3 1 (HH) 0 5 - 2 0 mmol/L   Lactic acid, plasma    Collection Time: 03/09/21  1:55 AM   Result Value Ref Range    LACTIC ACID 2 6 (HH) 0 5 - 2 0 mmol/L   APTT    Collection Time: 03/09/21  2:43 AM   Result Value Ref Range    PTT 58 (H) 23 - 37 seconds   Procalcitonin Reflex    Collection Time: 03/09/21  4:53 AM   Result Value Ref Range    Procalcitonin 0 91 (H) <=0 25 ng/ml   Lactic acid 2 Hours    Collection Time: 03/09/21  4:53 AM   Result Value Ref Range LACTIC ACID 2 5 (HH) 0 5 - 2 0 mmol/L   CBC    Collection Time: 03/09/21  4:53 AM   Result Value Ref Range    WBC 12 10 (H) 4 31 - 10 16 Thousand/uL    RBC 4 14 3 88 - 5 62 Million/uL    Hemoglobin 10 4 (L) 12 0 - 17 0 g/dL    Hematocrit 33 2 (L) 36 5 - 49 3 %    MCV 80 (L) 82 - 98 fL    MCH 25 1 (L) 26 8 - 34 3 pg    MCHC 31 3 (L) 31 4 - 37 4 g/dL    RDW 16 3 (H) 11 6 - 15 1 %    Platelets 069 425 - 709 Thousands/uL    MPV 11 8 8 9 - 12 7 fL   Comprehensive metabolic panel    Collection Time: 03/09/21  4:53 AM   Result Value Ref Range    Sodium 141 136 - 145 mmol/L    Potassium 3 8 3 5 - 5 3 mmol/L    Chloride 109 (H) 100 - 108 mmol/L    CO2 25 21 - 32 mmol/L    ANION GAP 7 4 - 13 mmol/L    BUN 20 5 - 25 mg/dL    Creatinine 0 69 0 60 - 1 30 mg/dL    Glucose 130 65 - 140 mg/dL    Calcium 7 6 (L) 8 3 - 10 1 mg/dL    Corrected Calcium 9 0 8 3 - 10 1 mg/dL    AST 76 (H) 5 - 45 U/L    ALT 81 (H) 12 - 78 U/L    Alkaline Phosphatase 55 46 - 116 U/L    Total Protein 5 9 (L) 6 4 - 8 2 g/dL    Albumin 2 3 (L) 3 5 - 5 0 g/dL    Total Bilirubin 0 64 0 20 - 1 00 mg/dL    eGFR 92 ml/min/1 73sq m   APTT    Collection Time: 03/09/21 10:52 AM   Result Value Ref Range    PTT 79 (H) 23 - 37 seconds   CBC and differential    Collection Time: 03/10/21  5:09 AM   Result Value Ref Range    WBC 14 01 (H) 4 31 - 10 16 Thousand/uL    RBC 4 58 3 88 - 5 62 Million/uL    Hemoglobin 11 5 (L) 12 0 - 17 0 g/dL    Hematocrit 37 1 36 5 - 49 3 %    MCV 81 (L) 82 - 98 fL    MCH 25 1 (L) 26 8 - 34 3 pg    MCHC 31 0 (L) 31 4 - 37 4 g/dL    RDW 16 3 (H) 11 6 - 15 1 %    MPV 11 8 8 9 - 12 7 fL    Platelets 674 372 - 158 Thousands/uL    nRBC 0 /100 WBCs   Comprehensive metabolic panel    Collection Time: 03/10/21  5:09 AM   Result Value Ref Range    Sodium 138 136 - 145 mmol/L    Potassium 4 6 3 5 - 5 3 mmol/L    Chloride 109 (H) 100 - 108 mmol/L    CO2 21 21 - 32 mmol/L    ANION GAP 8 4 - 13 mmol/L    BUN 18 5 - 25 mg/dL    Creatinine 0 75 0 60 - 1 30 mg/dL    Glucose 108 65 - 140 mg/dL    Calcium 8 1 (L) 8 3 - 10 1 mg/dL    Corrected Calcium 9 4 8 3 - 10 1 mg/dL     (H) 5 - 45 U/L     (H) 12 - 78 U/L    Alkaline Phosphatase 77 46 - 116 U/L    Total Protein 6 3 (L) 6 4 - 8 2 g/dL    Albumin 2 4 (L) 3 5 - 5 0 g/dL    Total Bilirubin 0 64 0 20 - 1 00 mg/dL    eGFR 88 ml/min/1 73sq m   Ferritin    Collection Time: 03/10/21  5:09 AM   Result Value Ref Range    Ferritin 257 8 - 388 ng/mL   C-reactive protein    Collection Time: 03/10/21  5:09 AM   Result Value Ref Range    CRP 77 5 (H) <3 0 mg/L   Procalcitonin with AM Reflex    Collection Time: 03/10/21  5:09 AM   Result Value Ref Range    Procalcitonin 0 61 (H) <=0 25 ng/ml   Procalcitonin Reflex    Collection Time: 03/11/21  4:51 AM   Result Value Ref Range    Procalcitonin 0 25 <=0 25 ng/ml   CBC    Collection Time: 03/11/21  4:51 AM   Result Value Ref Range    WBC 10 00 4 31 - 10 16 Thousand/uL    RBC 4 79 3 88 - 5 62 Million/uL    Hemoglobin 11 9 (L) 12 0 - 17 0 g/dL    Hematocrit 38 4 36 5 - 49 3 %    MCV 80 (L) 82 - 98 fL    MCH 24 8 (L) 26 8 - 34 3 pg    MCHC 31 0 (L) 31 4 - 37 4 g/dL    RDW 16 2 (H) 11 6 - 15 1 %    Platelets 477 511 - 350 Thousands/uL    MPV 10 9 8 9 - 12 7 fL   Comprehensive metabolic panel    Collection Time: 03/11/21  4:51 AM   Result Value Ref Range    Sodium 137 136 - 145 mmol/L    Potassium 4 4 3 5 - 5 3 mmol/L    Chloride 107 100 - 108 mmol/L    CO2 25 21 - 32 mmol/L    ANION GAP 5 4 - 13 mmol/L    BUN 20 5 - 25 mg/dL    Creatinine 0 78 0 60 - 1 30 mg/dL    Glucose 123 65 - 140 mg/dL    Calcium 8 2 (L) 8 3 - 10 1 mg/dL    Corrected Calcium 9 5 8 3 - 10 1 mg/dL     (H) 5 - 45 U/L     (H) 12 - 78 U/L    Alkaline Phosphatase 92 46 - 116 U/L    Total Protein 6 3 (L) 6 4 - 8 2 g/dL    Albumin 2 4 (L) 3 5 - 5 0 g/dL    Total Bilirubin 0 68 0 20 - 1 00 mg/dL    eGFR 87 ml/min/1 73sq m     Imaging: I have personally reviewed pertinent reports      Tele- afib RVR      Counseling / Coordination of Care  Total time spent today 20 minutes  Greater than 50% of total time was spent with the patient and / or family counseling and / or coordination of care

## 2021-03-11 NOTE — ASSESSMENT & PLAN NOTE
Patient with approximately two weeks of shortness of breath, fevers, chills, myalgias, diarrhea, poor PO intake, fatigue  Tested positive for COVID on 3/1/21 but had been having symptoms before that  Diarrhea has resolved  Per daughter, patient appeared more confused today  Patient satting 88% on room air on arrival to ED with improvement to 96% on 4L nasal cannula  - Labs: AST 77, ALT 79, albumin 2 6, lactic acid 2 5, procalcitonin 1 41, d-dimer 4 58, blood cultures pending, procal 1 41  -D-dimer trending downward, BNP elevated at 2900, no prior baseline  - Chest x-ray in the ED showing diffuse patchy infiltrates  - CT head showing acute pansinusitis, patient asymptomatic  - CTA showed no evidence of pulmonary emboli but with extensive ground-glass infiltrates related to COVID pneumonia and trace pleural effusions    - no smoking or drug history    Plan:   - COVID moderate pathway  - continue remdesivir, Decadron, ceftriaxone, doxycycline, day 4, would complete 7 day course for PNA  - trend inflammatory markers, procalc now normalized  - blood cultures negative for 48 hours  - chronic hepatitis panel negative  - anticoagulation with heparin drip  - wean O2 as tolerated, currently on 10 L   - aggressive pulmonary hygeine  - continue to monitor on telemetry

## 2021-03-11 NOTE — CASE MANAGEMENT
Cm continues to follow this pt  Pt currently requiring 10LNC  Continue with IV fluids and continue covid pathway for covid pnuemonia  Currently, recs remain for pt to return home once medically stable for dc  Cm will continue to follow

## 2021-03-11 NOTE — ASSESSMENT & PLAN NOTE
Patient with new onset AFib 1st noted in the ED with heart rates in 130s  Last EKG at 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  Patient is not on anticoagulation at home although he was previously taking aspirin daily but stopped due to fatigue  CHADS-VASc 2 due to patient age  Although patient was hypertensive in the ED, review of prior records showsnormal blood pressures at previous office visits  Patient also does not have a documented history of CAD and never underwent catheterization  S/p cardizem drip in the ED without improvement in heart rates  Denies chest pain, orthopnea, leg swelling    - TSH within normal limits  - CTA no evidence of PE  - Possibly due to acute COVID infection vs prior MI       Plan:  - continue Lopressor 12 5  Q 6 hours scheduled  Withhold parameters  - rate currently controlled between 70s to [de-identified]  - cardiology following, appreciated, patient initiated on digoxin, continue  - if patient has sustained AFib with RVR will consider starting patient on amiodarone given his hypotension  - Continue Xarelto  - closely monitor HR and blood pressures on telemetry

## 2021-03-11 NOTE — ASSESSMENT & PLAN NOTE
Secondary to COVID-19 pneumonia  Status post 2 L IV fluid boluses, no being diuresed for possible overload    Worsening respiratory status possibly due to volume overload secondary to IV fluid administration   Diuresis as described above  Management of COVID-19 as above

## 2021-03-12 LAB
ALBUMIN SERPL BCP-MCNC: 2.4 G/DL (ref 3.5–5)
ALP SERPL-CCNC: 90 U/L (ref 46–116)
ALT SERPL W P-5'-P-CCNC: 219 U/L (ref 12–78)
ANION GAP SERPL CALCULATED.3IONS-SCNC: 8 MMOL/L (ref 4–13)
AST SERPL W P-5'-P-CCNC: 96 U/L (ref 5–45)
BACTERIA BLD CULT: NORMAL
BACTERIA BLD CULT: NORMAL
BASOPHILS # BLD AUTO: 0.02 THOUSANDS/ΜL (ref 0–0.1)
BASOPHILS NFR BLD AUTO: 0 % (ref 0–1)
BILIRUB SERPL-MCNC: 0.53 MG/DL (ref 0.2–1)
BUN SERPL-MCNC: 24 MG/DL (ref 5–25)
CALCIUM ALBUM COR SERPL-MCNC: 9.7 MG/DL (ref 8.3–10.1)
CALCIUM SERPL-MCNC: 8.4 MG/DL (ref 8.3–10.1)
CHLORIDE SERPL-SCNC: 104 MMOL/L (ref 100–108)
CO2 SERPL-SCNC: 26 MMOL/L (ref 21–32)
CREAT SERPL-MCNC: 0.72 MG/DL (ref 0.6–1.3)
EOSINOPHIL # BLD AUTO: 0 THOUSAND/ΜL (ref 0–0.61)
EOSINOPHIL NFR BLD AUTO: 0 % (ref 0–6)
ERYTHROCYTE [DISTWIDTH] IN BLOOD BY AUTOMATED COUNT: 16.8 % (ref 11.6–15.1)
GFR SERPL CREATININE-BSD FRML MDRD: 90 ML/MIN/1.73SQ M
GLUCOSE SERPL-MCNC: 146 MG/DL (ref 65–140)
HCT VFR BLD AUTO: 40.7 % (ref 36.5–49.3)
HGB BLD-MCNC: 12.7 G/DL (ref 12–17)
IMM GRANULOCYTES # BLD AUTO: 0.19 THOUSAND/UL (ref 0–0.2)
IMM GRANULOCYTES NFR BLD AUTO: 2 % (ref 0–2)
LYMPHOCYTES # BLD AUTO: 0.86 THOUSANDS/ΜL (ref 0.6–4.47)
LYMPHOCYTES NFR BLD AUTO: 8 % (ref 14–44)
MCH RBC QN AUTO: 25.1 PG (ref 26.8–34.3)
MCHC RBC AUTO-ENTMCNC: 31.2 G/DL (ref 31.4–37.4)
MCV RBC AUTO: 80 FL (ref 82–98)
MONOCYTES # BLD AUTO: 0.37 THOUSAND/ΜL (ref 0.17–1.22)
MONOCYTES NFR BLD AUTO: 3 % (ref 4–12)
NEUTROPHILS # BLD AUTO: 10.07 THOUSANDS/ΜL (ref 1.85–7.62)
NEUTS SEG NFR BLD AUTO: 87 % (ref 43–75)
NRBC BLD AUTO-RTO: 0 /100 WBCS
PLATELET # BLD AUTO: 257 THOUSANDS/UL (ref 149–390)
PMV BLD AUTO: 11.4 FL (ref 8.9–12.7)
POTASSIUM SERPL-SCNC: 4.2 MMOL/L (ref 3.5–5.3)
PROT SERPL-MCNC: 6.4 G/DL (ref 6.4–8.2)
RBC # BLD AUTO: 5.06 MILLION/UL (ref 3.88–5.62)
SODIUM SERPL-SCNC: 138 MMOL/L (ref 136–145)
WBC # BLD AUTO: 11.51 THOUSAND/UL (ref 4.31–10.16)

## 2021-03-12 PROCEDURE — 80053 COMPREHEN METABOLIC PANEL: CPT | Performed by: STUDENT IN AN ORGANIZED HEALTH CARE EDUCATION/TRAINING PROGRAM

## 2021-03-12 PROCEDURE — 94760 N-INVAS EAR/PLS OXIMETRY 1: CPT

## 2021-03-12 PROCEDURE — 85025 COMPLETE CBC W/AUTO DIFF WBC: CPT | Performed by: STUDENT IN AN ORGANIZED HEALTH CARE EDUCATION/TRAINING PROGRAM

## 2021-03-12 PROCEDURE — 99231 SBSQ HOSP IP/OBS SF/LOW 25: CPT | Performed by: INTERNAL MEDICINE

## 2021-03-12 RX ORDER — FUROSEMIDE 40 MG/1
40 TABLET ORAL DAILY
Status: DISCONTINUED | OUTPATIENT
Start: 2021-03-13 | End: 2021-03-16

## 2021-03-12 RX ADMIN — CEFTRIAXONE 1000 MG: 2 INJECTION, POWDER, FOR SOLUTION INTRAMUSCULAR; INTRAVENOUS at 18:16

## 2021-03-12 RX ADMIN — FUROSEMIDE 40 MG: 10 INJECTION, SOLUTION INTRAMUSCULAR; INTRAVENOUS at 08:54

## 2021-03-12 RX ADMIN — METOPROLOL TARTRATE 25 MG: 25 TABLET, FILM COATED ORAL at 08:54

## 2021-03-12 RX ADMIN — DOXYCYCLINE 100 MG: 100 CAPSULE ORAL at 06:01

## 2021-03-12 RX ADMIN — FAMOTIDINE 20 MG: 20 TABLET ORAL at 18:16

## 2021-03-12 RX ADMIN — REMDESIVIR 100 MG: 100 INJECTION, POWDER, LYOPHILIZED, FOR SOLUTION INTRAVENOUS at 00:09

## 2021-03-12 RX ADMIN — RIVAROXABAN 20 MG: 20 TABLET, FILM COATED ORAL at 08:59

## 2021-03-12 RX ADMIN — METOPROLOL TARTRATE 25 MG: 25 TABLET, FILM COATED ORAL at 21:28

## 2021-03-12 RX ADMIN — Medication 2000 UNITS: at 08:54

## 2021-03-12 RX ADMIN — ZINC SULFATE 220 MG (50 MG) CAPSULE 220 MG: CAPSULE at 08:54

## 2021-03-12 RX ADMIN — DEXAMETHASONE SODIUM PHOSPHATE 6 MG: 4 INJECTION INTRA-ARTICULAR; INTRALESIONAL; INTRAMUSCULAR; INTRAVENOUS; SOFT TISSUE at 21:28

## 2021-03-12 RX ADMIN — DIGOXIN 125 MCG: 125 TABLET ORAL at 08:59

## 2021-03-12 RX ADMIN — DOXYCYCLINE 100 MG: 100 CAPSULE ORAL at 18:16

## 2021-03-12 RX ADMIN — STANDARDIZED SENNA CONCENTRATE 8.6 MG: 8.6 TABLET ORAL at 08:54

## 2021-03-12 RX ADMIN — OXYCODONE HYDROCHLORIDE AND ACETAMINOPHEN 1000 MG: 500 TABLET ORAL at 08:54

## 2021-03-12 RX ADMIN — DOCUSATE SODIUM 100 MG: 100 CAPSULE, LIQUID FILLED ORAL at 08:54

## 2021-03-12 RX ADMIN — DOCUSATE SODIUM 100 MG: 100 CAPSULE, LIQUID FILLED ORAL at 18:16

## 2021-03-12 RX ADMIN — FUROSEMIDE 40 MG: 10 INJECTION, SOLUTION INTRAMUSCULAR; INTRAVENOUS at 19:00

## 2021-03-12 RX ADMIN — OXYCODONE HYDROCHLORIDE AND ACETAMINOPHEN 1000 MG: 500 TABLET ORAL at 21:28

## 2021-03-12 RX ADMIN — MELATONIN 3 MG: at 21:28

## 2021-03-12 RX ADMIN — FAMOTIDINE 20 MG: 20 TABLET ORAL at 08:54

## 2021-03-12 NOTE — ASSESSMENT & PLAN NOTE
Secondary to COVID-19 pneumonia  Resolving  Status post 2 L IV fluid boluses, no being diuresed for possible overload    Improving respiratory status possibly due to volume overload secondary to IV fluid administration   Diuresis as described above   Management of COVID-19 as above

## 2021-03-12 NOTE — ASSESSMENT & PLAN NOTE
Patient with approximately two weeks of shortness of breath, fevers, chills, myalgias, diarrhea, poor PO intake, fatigue  Tested positive for COVID on 3/1/21 but had been having symptoms before that  Diarrhea has resolved  Per daughter, patient appeared more confused today  Patient satting 88% on room air on arrival to ED with improvement to 96% on 4L nasal cannula  - Labs: AST 77, ALT 79, albumin 2 6, lactic acid 2 5, procalcitonin 1 41, d-dimer 4 58, blood cultures pending, procal 1 41  -D-dimer trending downward, BNP elevated at 2900, no prior baseline  - Chest x-ray in the ED showing diffuse patchy infiltrates  - CT head showing acute pansinusitis, patient asymptomatic  - CTA showed no evidence of pulmonary emboli but with extensive ground-glass infiltrates related to COVID pneumonia and trace pleural effusions    - no smoking or drug history    Plan:   - COVID moderate pathway  - continue remdesivir, day 5  - continue Decadron, day 5  - ceftriaxone, doxycycline, day 5, would complete 7 day course for PNA  - trend inflammatory markers, procalc now normalized  - blood cultures negative for 48 hours  - chronic hepatitis panel negative  - anticoagulation with xarelto  - wean O2 as tolerated, currently on 8 L   - aggressive pulmonary hygeine  - continue to monitor on telemetry

## 2021-03-12 NOTE — PROGRESS NOTES
INTERNAL MEDICINE RESIDENCY PROGRESS NOTE     Name: Bradley Schaeffer   Age & Sex: 66 y o  male   MRN: [id number]  Unit/Bed#: -01   Encounter: [id number]  Team: SOD Team B     PATIENT INFORMATION     Name: Bradley Schaeffer   Age & Sex: 66 y o  male   MRN: [id number]  Hospital Stay Days: 5    ASSESSMENT/PLAN     Principal Problem:    Pneumonia due to COVID-19 virus  Active Problems:    Atrial fibrillation with RVR ([facility] [address] )    Acute respiratory failure with hypoxia (HCC)    Transaminitis    Lactic acidosis    Sepsis ([facility] [address] )      Sepsis ([facility] [address] )  Assessment & Plan  Secondary to COVID-19 pneumonia  Resolving  Status post 2 L IV fluid boluses, no being diuresed for possible overload  Improving respiratory status possibly due to volume overload secondary to IV fluid administration   Diuresis as described above   Management of COVID-19 as above    Lactic acidosis  Assessment & Plan  Down trending in the setting of sepsis secondary to COVID-19 pneumonia  Last checked 2 5  Has received over 2 L of IV resuscitation fluids  Other possible etiologies include heart failure though he appears euvolemic on exam     Echocardiogram is pending, will await results  Patient improving, no need to trend    Transaminitis  Assessment & Plan  AST trending down, ALT stable  Suspect drug-induced as he was recently started on antibiotics as well as Lipitor  Lipitor discontinued, given that it takes 3 days to be eliminated completely will monitor Liver function for now  Continue Tylenol low dose prn  Chronic hepatitis panel negative  Will continue to monitor  Continue to trend LFTs, will consider further workup or alternative abx if liver enzymes continue to increase      Acute respiratory failure with hypoxia (HCC)  Assessment & Plan  Due to COVID pneumonia, possible heart failure  Improved to 8 L since yesterday  Chest x-ray appears grossly volume overloaded  Appears euvolemic on exam  BNP however was elevated at 2900, no prior baseline  Echocardiogram largely unremarkable  Continue to wean oxygen, currently on 8L  Will continue diuresis with Lasix 40 mg IV b i d  then switch to 40 mg Daily tomorrow  Critical care is aware of patient in case patient decompensates    Atrial fibrillation with RVR ([facility])  Assessment & Plan  Currently stable  - TSH within normal limits  - CTA no evidence of PE  - Possibly due to acute COVID infection    Plan:  - continue Lopressor 12 5  Q 12 hours scheduled  Withhold parameters  - rate currently controlled between 70s to 80s  - cardiology following, appreciated, continue digoxin  - if patient has sustained AFib with RVR will consider starting patient on amiodarone given his hypotension  - continue Xarelto QD  - closely monitor HR and blood pressures on telemetry  * Pneumonia due to COVID-19 virus  Assessment & Plan  Patient with approximately two weeks of shortness of breath, fevers, chills, myalgias, diarrhea, poor PO intake, fatigue  Tested positive for COVID on 3/1/21 but had been having symptoms before that  Diarrhea has resolved  Per daughter, patient appeared more confused today  Patient satting 88% on room air on arrival to ED with improvement to 96% on 4L nasal cannula  - Labs: AST 77, ALT 79, albumin 2 6, lactic acid 2 5, procalcitonin 1 41, d-dimer 4 58, blood cultures pending, procal 1 41  -D-dimer trending downward, BNP elevated at 2900, no prior baseline  - Chest x-ray in the ED showing diffuse patchy infiltrates  - CT head showing acute pansinusitis, patient asymptomatic  - CTA showed no evidence of pulmonary emboli but with extensive ground-glass infiltrates related to COVID pneumonia and trace pleural effusions    - no smoking or drug history    Plan:   - COVID moderate pathway  - continue remdesivir, day 5  - continue Decadron, day 5  - ceftriaxone, doxycycline, day 5, would complete 7 day course for PNA  - trend inflammatory markers, procalc now normalized  - blood cultures negative for 48 hours  - chronic hepatitis panel negative  - anticoagulation with xarelto  - wean O2 as tolerated, currently on 8 L   - aggressive pulmonary hygeine  - continue to monitor on telemetry      Daughter Updated, All questions answered    Disposition: Progressing, if able to get to 2-3 L will order home o2 eval to expedite discharge    SUBJECTIVE     Patient seen and examined  No acute events overnight  Patient denies any SOB, CP, n/v/d  Tolerating PO  Rest of ROS negative    OBJECTIVE     Vitals:    21 1942 21 2313 21 0319 21 0727   BP:  99/58 102/61    Pulse:  64 89    Resp:  19 18    Temp:  97 8 °F (36 6 °C) (!) 97 2 °F (36 2 °C)    TempSrc:       SpO2: 90% 95% 91% 96%   Weight:       Height:          Temperature:   Temp (24hrs), Av 5 °F (36 4 °C), Min:97 2 °F (36 2 °C), Max:97 8 °F (36 6 °C)    Temperature: (!) 97 2 °F (36 2 °C)  Intake & Output:  I/O       03/10 07 -  0700  07 -  0700  07 -  0700    P  O  580 800 200    I V  (mL/kg)       Total Intake(mL/kg) 580 (8 7) 800 (11 9) 200 (3)    Urine (mL/kg/hr) 3255 (2) 2550 (1 6)     Stool 0 0     Total Output 3255 2550     Net -2675 -1750 +200           Unmeasured Urine Occurrence 5 x      Unmeasured Stool Occurrence 1 x 1 x         Weights:   IBW: 68 4 kg    Body mass index is 22 46 kg/m²  Weight (last 2 days)     None        Physical Exam  Constitutional:       General: He is not in acute distress  Appearance: Normal appearance  He is not ill-appearing  Interventions: Nasal cannula in place  HENT:      Head: Normocephalic and atraumatic  Mouth/Throat:      Mouth: Mucous membranes are moist    Eyes:      Extraocular Movements: Extraocular movements intact  Conjunctiva/sclera: Conjunctivae normal       Pupils: Pupils are equal, round, and reactive to light  Cardiovascular:      Rate and Rhythm: Normal rate  Rhythm irregular  Pulses: Normal pulses        Heart sounds: Normal heart sounds  No murmur  No friction rub  No gallop  Pulmonary:      Effort: Pulmonary effort is normal  No respiratory distress  Breath sounds: Normal breath sounds  No wheezing or rales  Abdominal:      General: Abdomen is flat  Bowel sounds are normal  There is no distension  Palpations: Abdomen is soft  Tenderness: There is no abdominal tenderness  There is no guarding  Musculoskeletal:      Right lower leg: No edema  Left lower leg: No edema  Skin:     General: Skin is warm and dry  Neurological:      General: No focal deficit present  Mental Status: He is alert and oriented to person, place, and time  Psychiatric:         Mood and Affect: Mood normal          Behavior: Behavior normal        LABORATORY DATA     Labs: I have personally reviewed pertinent reports  Results from last 7 days   Lab Units 03/12/21  0512 03/11/21  0451 03/10/21  0509  03/07/21  1810   WBC Thousand/uL 11 51* 10 00 14 01*   < > 9 48   HEMOGLOBIN g/dL 12 7 11 9* 11 5*   < > 12 1   HEMATOCRIT % 40 7 38 4 37 1   < > 38 8   PLATELETS Thousands/uL 257 230 326   < > 264   NEUTROS PCT % 87*  --   --   --   --    MONOS PCT % 3*  --   --   --   --    MONO PCT %  --   --   --   --  1*    < > = values in this interval not displayed  Results from last 7 days   Lab Units 03/12/21  0512 03/11/21  0451 03/10/21  0509   POTASSIUM mmol/L 4 2 4 4 4 6   CHLORIDE mmol/L 104 107 109*   CO2 mmol/L 26 25 21   BUN mg/dL 24 20 18   CREATININE mg/dL 0 72 0 78 0 75   CALCIUM mg/dL 8 4 8 2* 8 1*   ALK PHOS U/L 90 92 77   ALT U/L 219* 207* 137*   AST U/L 96* 148* 105*     Results from last 7 days   Lab Units 03/07/21  1810   MAGNESIUM mg/dL 2 4          Results from last 7 days   Lab Units 03/09/21  1052 03/09/21  0243 03/08/21  1819  03/07/21  1810   INR   --   --   --   --  1 23*   PTT seconds 79* 58* 54*   < > 32    < > = values in this interval not displayed       Results from last 7 days   Lab Units 03/09/21  0453   LACTIC ACID mmol/L 2 5*     Results from last 7 days   Lab Units 03/07/21  1814   TROPONIN I ng/mL <0 02       IMAGING & DIAGNOSTIC TESTING     Radiology Results: I have personally reviewed pertinent reports  Xr Chest Portable    Result Date: 3/10/2021  Impression: Worsening of bilateral pulmonary infiltrates Workstation performed: [id number]     Xr Chest 1 View Portable    Result Date: 3/8/2021  Impression: Diffuse patchy groundglass opacity throughout the lung fields  No effusions or pneumothorax  In the setting of clinically suspected/proven COVID-19, this plain film appearance while nonspecific, can be seen in cases of viral pneumonia such as COVID-19  Workstation performed: [id number]     Ct Head Without Contrast    Result Date: 3/7/2021  Impression: Acute pansinusitis  No sign of an acute transcortical infarction  No acute intracranial hemorrhage  Workstation performed: [id number]     Cta Ed Chest Pe Study    Result Date: 3/7/2021  Impression: No evidence of pulmonary emboli  Extensive groundglass infiltrates related to Covid pneumonia  Trace pleural effusions  Mild mediastinal and hilar adenopathy Workstation performed: [id number]     Other Diagnostic Testing: I have personally reviewed pertinent reports      ACTIVE MEDICATIONS     Current Facility-Administered Medications   Medication Dose Route Frequency    acetaminophen (TYLENOL) tablet 650 mg  650 mg Oral Q6H PRN    ascorbic acid (VITAMIN C) tablet 1,000 mg  1,000 mg Oral Q12H [address]    ceftriaxone (ROCEPHIN) 1 g/50 mL in dextrose IVPB  1,000 mg Intravenous Q24H    cholecalciferol (VITAMIN D3) tablet 2,000 Units  2,000 Units Oral Daily    dexamethasone (DECADRON) injection 6 mg  6 mg Intravenous Q24H    digoxin (LANOXIN) tablet 125 mcg  125 mcg Oral Daily    docusate sodium (COLACE) capsule 100 mg  100 mg Oral BID    doxycycline hyclate (VIBRAMYCIN) capsule 100 mg  100 mg Oral Q12H    famotidine (PEPCID) tablet 20 mg  20 mg Oral BID    furosemide (LASIX) injection 40 mg  40 mg Intravenous BID (diuretic)    [START ON 3/13/2021] furosemide (LASIX) tablet 40 mg  40 mg Oral Daily    melatonin tablet 3 mg  3 mg Oral HS    metoprolol tartrate (LOPRESSOR) tablet 25 mg  25 mg Oral Q12H [address]    zinc sulfate (ZINCATE) capsule 220 mg  220 mg Oral Daily    Followed by   Mckenziephill [place] ON 3/15/2021] multivitamin-minerals (CENTRUM ADULTS) tablet 1 tablet  1 tablet Oral Daily    ondansetron (ZOFRAN) injection 4 mg  4 mg Intravenous Q6H PRN    remdesivir (Veklury) 100 mg in sodium chloride 0 9 % 250 mL IVPB  100 mg Intravenous Q24H    rivaroxaban (XARELTO) tablet 20 mg  20 mg Oral Daily With Breakfast    senna (SENOKOT) tablet 8 6 mg  1 tablet Oral Daily       VTE Pharmacologic Prophylaxis: Reason for no pharmacologic prophylaxis Xarelto  VTE Mechanical Prophylaxis: sequential compression device    Portions of the record may have been created with voice recognition software  Occasional wrong word or "sound a like" substitutions may have occurred due to the inherent limitations of voice recognition software    Read the chart carefully and recognize, using context, where substitutions have occurred   ==  Solo Harry, [address] [facility]  Internal Medicine Residency PGY-2

## 2021-03-12 NOTE — ASSESSMENT & PLAN NOTE
Due to COVID pneumonia, possible heart failure  Improved to 8 L since yesterday  Chest x-ray appears grossly volume overloaded  Appears euvolemic on exam  BNP however was elevated at 2900, no prior baseline  Echocardiogram largely unremarkable  Continue to wean oxygen, currently on 8L  Will continue diuresis with Lasix 40 mg IV b i d  then switch to 40 mg Daily tomorrow  Critical care is aware of patient in case patient decompensates

## 2021-03-12 NOTE — ASSESSMENT & PLAN NOTE
Currently stable  - TSH within normal limits  - CTA no evidence of PE  - Possibly due to acute COVID infection    Plan:  - continue Lopressor 12 5  Q 12 hours scheduled  Withhold parameters  - rate currently controlled between 70s to 80s  - cardiology following, appreciated, continue digoxin  - if patient has sustained AFib with RVR will consider starting patient on amiodarone given his hypotension  - continue Xarelto QD  - closely monitor HR and blood pressures on telemetry

## 2021-03-12 NOTE — RESTORATIVE TECHNICIAN NOTE
Restorative Specialist Mobility Note       Activity: Ambulate in room(back in bed per pt's request)     Assistive Device: None

## 2021-03-12 NOTE — ASSESSMENT & PLAN NOTE
Down trending in the setting of sepsis secondary to COVID-19 pneumonia  Last checked 2 5  Has received over 2 L of IV resuscitation fluids  Other possible etiologies include heart failure though he appears euvolemic on exam     Echocardiogram is pending, will await results  Patient improving, no need to trend

## 2021-03-12 NOTE — ASSESSMENT & PLAN NOTE
AST trending down, ALT stable  Suspect drug-induced as he was recently started on antibiotics as well as Lipitor  Lipitor discontinued, given that it takes 3 days to be eliminated completely will monitor Liver function for now  Continue Tylenol low dose prn  Chronic hepatitis panel negative  Will continue to monitor  Continue to trend LFTs, will consider further workup or alternative abx if liver enzymes continue to increase

## 2021-03-12 NOTE — PROGRESS NOTES
Progress Note - Cardiology   MISSION [facility] 66 y o  male MRN: [id number]  Unit/Bed#: -01 Encounter: [id number]        Principal Problem:    Pneumonia due to COVID-19 virus  Active Problems:    Atrial fibrillation with RVR ([address] )    Acute respiratory failure with hypoxia (HCC)    Transaminitis    Lactic acidosis    Sepsis ([facility] [address] )        Assessment/Plan     1  Atrial fibrillation with RVR- new diagnosis  Presented with afib with RVR  EKG Sept 2020- NSR RBBB  IV Cardizem has been stopped 3/9  BB- lopressor 25mg BID- today will be day he will receive this dosing    CHADS2-VASc= 2 for age   As well is in hypercoagulable state with COVID   Xarelto 20mg started  We loaded with 1 gram digoxin and daily 125mcg daily   HR improved but still with pds RVR  TTE- LVEF 50% with MR mild AI  RV size and function normal   TSH- 1 4  Will need f/u with cardiology  Will schedule telemedicine visit       2  Acute hypoxic respiratory failure- multifactorial -COVID/volume overload  CT of the chest no PE   Extensive ground-glass infiltrates related to COVID pneumonia  ProBNP 2, 948 (no baseline)  Troponin negative x1  With increasing oxygen requirements- IVF stopped ( had been receiving fluids for lactic acidosis, had a few liters)  and diuretics started  Now down to 8 liters  Per RN no rales  Net negative 1750 and 2100 day prior  Spoke with primary team now are planning to transition to oral Lasix  He will receive 40mg IV this am   Recommend 40 mg p o  Daily tomorrow   He may not need this long term       3  COVID-19-on moderate pathway  remdesivir, Decadron, ceftriaxone and doxycycline  Tested positive March 1, 2021  Mild hypoxia on presentation  Worsening oxygenation with volume overload   Had been receiving fluid resusitation  for lactic acidosis  Elevated inflammatory markers  CT of the chest extensive ground-glass infiltrates       Subjective/Objective   Chief Complaint/Subjective  Spoke with patient's are and and primary team   Patient on 8 L this morning  No rales  Spoke about transitioning to oral diuretics  Patient is asking when he is going home          Vitals: /61   Pulse 89   Temp (!) 97 2 °F (36 2 °C)   Resp 18   Ht 5' 8" (1 727 m)   Wt 67 kg (147 lb 11 3 oz)   SpO2 96%   BMI 22 46 kg/m²     Vitals:    03/07/21 1953 03/08/21 0300   Weight: 67 kg (147 lb 11 3 oz) 67 kg (147 lb 11 3 oz)     Orthostatic Blood Pressures      Most Recent Value   Blood Pressure  102/61 filed at 03/12/2021 0319   Patient Position - Orthostatic VS  Lying filed at 03/10/2021 1523            Intake/Output Summary (Last 24 hours) at 3/12/2021 0842  Last data filed at 3/12/2021 0640  Gross per 24 hour   Intake 800 ml   Output 2550 ml   Net -1750 ml       Invasive Devices     Peripheral Intravenous Line            Peripheral IV 03/08/21 Left Forearm 4 days                Current Facility-Administered Medications   Medication Dose Route Frequency    acetaminophen (TYLENOL) tablet 650 mg  650 mg Oral Q6H PRN    ascorbic acid (VITAMIN C) tablet 1,000 mg  1,000 mg Oral Q12H ELIE    ceftriaxone (ROCEPHIN) 1 g/50 mL in dextrose IVPB  1,000 mg Intravenous Q24H    cholecalciferol (VITAMIN D3) tablet 2,000 Units  2,000 Units Oral Daily    dexamethasone (DECADRON) injection 6 mg  6 mg Intravenous Q24H    digoxin (LANOXIN) tablet 125 mcg  125 mcg Oral Daily    docusate sodium (COLACE) capsule 100 mg  100 mg Oral BID    doxycycline hyclate (VIBRAMYCIN) capsule 100 mg  100 mg Oral Q12H    famotidine (PEPCID) tablet 20 mg  20 mg Oral BID    furosemide (LASIX) injection 40 mg  40 mg Intravenous BID (diuretic)    melatonin tablet 3 mg  3 mg Oral HS    metoprolol tartrate (LOPRESSOR) tablet 25 mg  25 mg Oral Q12H ELIE    zinc sulfate (ZINCATE) capsule 220 mg  220 mg Oral Daily    Followed by   Ellie Ordoñez ON 3/15/2021] multivitamin-minerals (CENTRUM ADULTS) tablet 1 tablet  1 tablet Oral Daily    ondansetron (ZOFRAN) injection 4 mg  4 mg Intravenous Q6H PRN    remdesivir (Veklury) 100 mg in sodium chloride 0 9 % 250 mL IVPB  100 mg Intravenous Q24H    rivaroxaban (XARELTO) tablet 20 mg  20 mg Oral Daily With Breakfast    senna (SENOKOT) tablet 8 6 mg  1 tablet Oral Daily         Physical Exam: /61   Pulse 89   Temp (!) 97 2 °F (36 2 °C)   Resp 18   Ht 5' 8" (1 727 m)   Wt 67 kg (147 lb 11 3 oz)   SpO2 96%   BMI 22 46 kg/m²         I did not enter patient's room due to COVID isolation  With patient's RN primary team   No rales today  Remains on 8 L  Seen from the window  Appears comfortable breathing appears nonlabored                                                                            Lab Results:   Recent Results (from the past 72 hour(s))   APTT    Collection Time: 03/09/21 10:52 AM   Result Value Ref Range    PTT 79 (H) 23 - 37 seconds   CBC and differential    Collection Time: 03/10/21  5:09 AM   Result Value Ref Range    WBC 14 01 (H) 4 31 - 10 16 Thousand/uL    RBC 4 58 3 88 - 5 62 Million/uL    Hemoglobin 11 5 (L) 12 0 - 17 0 g/dL    Hematocrit 37 1 36 5 - 49 3 %    MCV 81 (L) 82 - 98 fL    MCH 25 1 (L) 26 8 - 34 3 pg    MCHC 31 0 (L) 31 4 - 37 4 g/dL    RDW 16 3 (H) 11 6 - 15 1 %    MPV 11 8 8 9 - 12 7 fL    Platelets 894 704 - 079 Thousands/uL    nRBC 0 /100 WBCs   Comprehensive metabolic panel    Collection Time: 03/10/21  5:09 AM   Result Value Ref Range    Sodium 138 136 - 145 mmol/L    Potassium 4 6 3 5 - 5 3 mmol/L    Chloride 109 (H) 100 - 108 mmol/L    CO2 21 21 - 32 mmol/L    ANION GAP 8 4 - 13 mmol/L    BUN 18 5 - 25 mg/dL    Creatinine 0 75 0 60 - 1 30 mg/dL    Glucose 108 65 - 140 mg/dL    Calcium 8 1 (L) 8 3 - 10 1 mg/dL    Corrected Calcium 9 4 8 3 - 10 1 mg/dL     (H) 5 - 45 U/L     (H) 12 - 78 U/L    Alkaline Phosphatase 77 46 - 116 U/L    Total Protein 6 3 (L) 6 4 - 8 2 g/dL    Albumin 2 4 (L) 3 5 - 5 0 g/dL    Total Bilirubin 0 64 0 20 - 1 00 mg/dL    eGFR 88 ml/min/1 73sq m   Ferritin Collection Time: 03/10/21  5:09 AM   Result Value Ref Range    Ferritin 257 8 - 388 ng/mL   C-reactive protein    Collection Time: 03/10/21  5:09 AM   Result Value Ref Range    CRP 77 5 (H) <3 0 mg/L   Procalcitonin with AM Reflex    Collection Time: 03/10/21  5:09 AM   Result Value Ref Range    Procalcitonin 0 61 (H) <=0 25 ng/ml   Procalcitonin Reflex    Collection Time: 03/11/21  4:51 AM   Result Value Ref Range    Procalcitonin 0 25 <=0 25 ng/ml   CBC    Collection Time: 03/11/21  4:51 AM   Result Value Ref Range    WBC 10 00 4 31 - 10 16 Thousand/uL    RBC 4 79 3 88 - 5 62 Million/uL    Hemoglobin 11 9 (L) 12 0 - 17 0 g/dL    Hematocrit 38 4 36 5 - 49 3 %    MCV 80 (L) 82 - 98 fL    MCH 24 8 (L) 26 8 - 34 3 pg    MCHC 31 0 (L) 31 4 - 37 4 g/dL    RDW 16 2 (H) 11 6 - 15 1 %    Platelets 366 659 - 428 Thousands/uL    MPV 10 9 8 9 - 12 7 fL   Comprehensive metabolic panel    Collection Time: 03/11/21  4:51 AM   Result Value Ref Range    Sodium 137 136 - 145 mmol/L    Potassium 4 4 3 5 - 5 3 mmol/L    Chloride 107 100 - 108 mmol/L    CO2 25 21 - 32 mmol/L    ANION GAP 5 4 - 13 mmol/L    BUN 20 5 - 25 mg/dL    Creatinine 0 78 0 60 - 1 30 mg/dL    Glucose 123 65 - 140 mg/dL    Calcium 8 2 (L) 8 3 - 10 1 mg/dL    Corrected Calcium 9 5 8 3 - 10 1 mg/dL     (H) 5 - 45 U/L     (H) 12 - 78 U/L    Alkaline Phosphatase 92 46 - 116 U/L    Total Protein 6 3 (L) 6 4 - 8 2 g/dL    Albumin 2 4 (L) 3 5 - 5 0 g/dL    Total Bilirubin 0 68 0 20 - 1 00 mg/dL    eGFR 87 ml/min/1 73sq m   CBC and differential    Collection Time: 03/12/21  5:12 AM   Result Value Ref Range    WBC 11 51 (H) 4 31 - 10 16 Thousand/uL    RBC 5 06 3 88 - 5 62 Million/uL    Hemoglobin 12 7 12 0 - 17 0 g/dL    Hematocrit 40 7 36 5 - 49 3 %    MCV 80 (L) 82 - 98 fL    MCH 25 1 (L) 26 8 - 34 3 pg    MCHC 31 2 (L) 31 4 - 37 4 g/dL    RDW 16 8 (H) 11 6 - 15 1 %    MPV 11 4 8 9 - 12 7 fL    Platelets 784 822 - 884 Thousands/uL    nRBC 0 /100 WBCs Neutrophils Relative 87 (H) 43 - 75 %    Immat GRANS % 2 0 - 2 %    Lymphocytes Relative 8 (L) 14 - 44 %    Monocytes Relative 3 (L) 4 - 12 %    Eosinophils Relative 0 0 - 6 %    Basophils Relative 0 0 - 1 %    Neutrophils Absolute 10 07 (H) 1 85 - 7 62 Thousands/µL    Immature Grans Absolute 0 19 0 00 - 0 20 Thousand/uL    Lymphocytes Absolute 0 86 0 60 - 4 47 Thousands/µL    Monocytes Absolute 0 37 0 17 - 1 22 Thousand/µL    Eosinophils Absolute 0 00 0 00 - 0 61 Thousand/µL    Basophils Absolute 0 02 0 00 - 0 10 Thousands/µL   Comprehensive metabolic panel    Collection Time: 21  5:12 AM   Result Value Ref Range    Sodium 138 136 - 145 mmol/L    Potassium 4 2 3 5 - 5 3 mmol/L    Chloride 104 100 - 108 mmol/L    CO2 26 21 - 32 mmol/L    ANION GAP 8 4 - 13 mmol/L    BUN 24 5 - 25 mg/dL    Creatinine 0 72 0 60 - 1 30 mg/dL    Glucose 146 (H) 65 - 140 mg/dL    Calcium 8 4 8 3 - 10 1 mg/dL    Corrected Calcium 9 7 8 3 - 10 1 mg/dL    AST 96 (H) 5 - 45 U/L     (H) 12 - 78 U/L    Alkaline Phosphatase 90 46 - 116 U/L    Total Protein 6 4 6 4 - 8 2 g/dL    Albumin 2 4 (L) 3 5 - 5 0 g/dL    Total Bilirubin 0 53 0 20 - 1 00 mg/dL    eGFR 90 ml/min/1 73sq m     Imaging: I have personally reviewed pertinent reports  Tele- afib [address], [address] [place] [address]  [phone]  Transthoracic Echocardiogram  Limited 2D, Doppler, and Color Doppler  Study date:  10-Mar-2021  Patient: Kiran Muñoz  MR number: [id number]  Account number: [de-identified]  : 1945  Age: 66 years  Gender: Male  Status: Inpatient  Location: Bedside  Height: 68 in  Weight: 147 lb  BP: 117/ 69 mmHg  Indications: Afib  Diagnoses: I48 0 - Atrial fibrillation  Sonographer:  ADA Dixon  Referring Physician:  Evonne Alvarez DO  Group:  [facility]  Cardiology Fellow:   Christina Finley MD  Interpreting Physician:  Justa Varghese MD  IMPRESSIONS:  This was a limited study to evaluate left ventricular function  SUMMARY  LEFT VENTRICLE:  Size was normal   Systolic function was at the lower limits of normal  Ejection fraction was estimated to be 50 %  There were no regional wall motion abnormalities  Wall thickness was normal   There was no evidence of concentric hypertrophy  RIGHT VENTRICLE:  The size was normal   Systolic function was normal   MITRAL VALVE:  There was mild regurgitation  AORTIC VALVE:  There was mild regurgitation  TRICUSPID VALVE:  There was mild regurgitation  Pulmonary artery systolic pressure was within the normal range  Estimated peak PA pressure was 30 mmHg  HISTORY: PRIOR HISTORY: COVID-19  PROCEDURE: The procedure was performed at the bedside  This was a routine study  The transthoracic approach was used  The study included limited 2D imaging, limited spectral Doppler, and color Doppler  Images were obtained from the  parasternal, apical, and subcostal acoustic windows  Echocardiographic views were limited due to poor acoustic window availability and lung interference  This was a technically difficult study  LEFT VENTRICLE: Size was normal  Systolic function was at the lower limits of normal  Ejection fraction was estimated to be 50 %  There were no regional wall motion abnormalities  Wall thickness was normal  There was no evidence of  concentric hypertrophy  DOPPLER: The study was not technically sufficient to allow evaluation of LV diastolic function  RIGHT VENTRICLE: The size was normal  Systolic function was normal   LEFT ATRIUM: Size was normal   RIGHT ATRIUM: Size was at the upper limits of normal   MITRAL VALVE: Valve structure was normal  There was normal leaflet separation  DOPPLER: The transmitral velocity was within the normal range  There was no evidence for stenosis  There was mild regurgitation  AORTIC VALVE: The valve was probably trileaflet  Leaflets exhibited normal thickness and normal cuspal separation   DOPPLER: Transaortic velocity was within the normal range  There was no evidence for stenosis  There was mild regurgitation  TRICUSPID VALVE: The valve structure was normal  There was normal leaflet separation  DOPPLER: The transtricuspid velocity was within the normal range  There was no evidence for stenosis  There was mild regurgitation  Pulmonary artery  systolic pressure was within the normal range  Estimated peak PA pressure was 30 mmHg  PULMONIC VALVE: Not well visualized  PERICARDIUM: There was no pericardial effusion  AORTA: The root exhibited normal size  SYSTEMIC VEINS: IVC: The inferior vena cava was normal in size and course  Respirophasic changes were normal   SYSTEM MEASUREMENT TABLES  2D  %FS: 29 71 %  Ao Diam: 3 07 cm  EDV(Teich): 105 3 ml  EF(Teich): 56 72 %  ESV(Teich): 45 57 ml  IVSd: 0 76 cm  LA Diam: 3 36 cm  LVIDd: 4 76 cm  LVIDs: 3 34 cm  LVPWd: 0 86 cm  SV(Teich): 59 73 ml  CW  TR Vmax: 2 64 m/s  TR maxP 92 mmHg  [facility]  Prepared and electronically signed by  Bironna Coleman MD  Signed 11-Mar-2021 08:58:00  Counseling / Coordination of Care  Total time spent today 20 minutes  Greater than 50% of total time was spent with the patient and / or family counseling and / or coordination of care

## 2021-03-13 PROBLEM — A41.9 SEPSIS (HCC): Status: RESOLVED | Noted: 2021-03-09 | Resolved: 2021-03-13

## 2021-03-13 LAB
ALBUMIN SERPL BCP-MCNC: 2.4 G/DL (ref 3.5–5)
ALP SERPL-CCNC: 107 U/L (ref 46–116)
ALT SERPL W P-5'-P-CCNC: 277 U/L (ref 12–78)
ANION GAP SERPL CALCULATED.3IONS-SCNC: 10 MMOL/L (ref 4–13)
ANISOCYTOSIS BLD QL SMEAR: PRESENT
AST SERPL W P-5'-P-CCNC: 93 U/L (ref 5–45)
BASOPHILS # BLD MANUAL: 0 THOUSAND/UL (ref 0–0.1)
BASOPHILS NFR MAR MANUAL: 0 % (ref 0–1)
BILIRUB SERPL-MCNC: 0.76 MG/DL (ref 0.2–1)
BUN SERPL-MCNC: 34 MG/DL (ref 5–25)
CALCIUM ALBUM COR SERPL-MCNC: 9.6 MG/DL (ref 8.3–10.1)
CALCIUM SERPL-MCNC: 8.3 MG/DL (ref 8.3–10.1)
CHLORIDE SERPL-SCNC: 104 MMOL/L (ref 100–108)
CO2 SERPL-SCNC: 26 MMOL/L (ref 21–32)
CREAT SERPL-MCNC: 0.81 MG/DL (ref 0.6–1.3)
EOSINOPHIL # BLD MANUAL: 0 THOUSAND/UL (ref 0–0.4)
EOSINOPHIL NFR BLD MANUAL: 0 % (ref 0–6)
ERYTHROCYTE [DISTWIDTH] IN BLOOD BY AUTOMATED COUNT: 17.6 % (ref 11.6–15.1)
GFR SERPL CREATININE-BSD FRML MDRD: 86 ML/MIN/1.73SQ M
GIANT PLATELETS BLD QL SMEAR: PRESENT
GLUCOSE SERPL-MCNC: 116 MG/DL (ref 65–140)
HCT VFR BLD AUTO: 44.5 % (ref 36.5–49.3)
HGB BLD-MCNC: 13.5 G/DL (ref 12–17)
LYMPHOCYTES # BLD AUTO: 0.27 THOUSAND/UL (ref 0.6–4.47)
LYMPHOCYTES # BLD AUTO: 2 % (ref 14–44)
MCH RBC QN AUTO: 24.7 PG (ref 26.8–34.3)
MCHC RBC AUTO-ENTMCNC: 30.3 G/DL (ref 31.4–37.4)
MCV RBC AUTO: 82 FL (ref 82–98)
MONOCYTES # BLD AUTO: 0.4 THOUSAND/UL (ref 0–1.22)
MONOCYTES NFR BLD: 3 % (ref 4–12)
MYELOCYTES NFR BLD MANUAL: 1 % (ref 0–1)
NEUTROPHILS # BLD MANUAL: 12.34 THOUSAND/UL (ref 1.85–7.62)
NEUTS SEG NFR BLD AUTO: 93 % (ref 43–75)
NRBC BLD AUTO-RTO: 0 /100 WBCS
PLATELET # BLD AUTO: 283 THOUSANDS/UL (ref 149–390)
PLATELET BLD QL SMEAR: ADEQUATE
PMV BLD AUTO: 11.4 FL (ref 8.9–12.7)
POLYCHROMASIA BLD QL SMEAR: PRESENT
POTASSIUM SERPL-SCNC: 4.3 MMOL/L (ref 3.5–5.3)
PROT SERPL-MCNC: 6.8 G/DL (ref 6.4–8.2)
RBC # BLD AUTO: 5.46 MILLION/UL (ref 3.88–5.62)
RBC MORPH BLD: PRESENT
SODIUM SERPL-SCNC: 140 MMOL/L (ref 136–145)
VARIANT LYMPHS # BLD AUTO: 1 %
WBC # BLD AUTO: 13.27 THOUSAND/UL (ref 4.31–10.16)

## 2021-03-13 PROCEDURE — 85027 COMPLETE CBC AUTOMATED: CPT | Performed by: STUDENT IN AN ORGANIZED HEALTH CARE EDUCATION/TRAINING PROGRAM

## 2021-03-13 PROCEDURE — 80053 COMPREHEN METABOLIC PANEL: CPT | Performed by: STUDENT IN AN ORGANIZED HEALTH CARE EDUCATION/TRAINING PROGRAM

## 2021-03-13 PROCEDURE — 99232 SBSQ HOSP IP/OBS MODERATE 35: CPT | Performed by: INTERNAL MEDICINE

## 2021-03-13 PROCEDURE — 94760 N-INVAS EAR/PLS OXIMETRY 1: CPT

## 2021-03-13 PROCEDURE — 85007 BL SMEAR W/DIFF WBC COUNT: CPT | Performed by: STUDENT IN AN ORGANIZED HEALTH CARE EDUCATION/TRAINING PROGRAM

## 2021-03-13 RX ADMIN — FAMOTIDINE 20 MG: 20 TABLET ORAL at 17:00

## 2021-03-13 RX ADMIN — Medication 2000 UNITS: at 08:52

## 2021-03-13 RX ADMIN — OXYCODONE HYDROCHLORIDE AND ACETAMINOPHEN 1000 MG: 500 TABLET ORAL at 08:52

## 2021-03-13 RX ADMIN — DIGOXIN 125 MCG: 125 TABLET ORAL at 08:52

## 2021-03-13 RX ADMIN — METOPROLOL TARTRATE 25 MG: 25 TABLET, FILM COATED ORAL at 20:29

## 2021-03-13 RX ADMIN — FUROSEMIDE 40 MG: 40 TABLET ORAL at 08:52

## 2021-03-13 RX ADMIN — ZINC SULFATE 220 MG (50 MG) CAPSULE 220 MG: CAPSULE at 08:52

## 2021-03-13 RX ADMIN — DOCUSATE SODIUM 100 MG: 100 CAPSULE, LIQUID FILLED ORAL at 08:52

## 2021-03-13 RX ADMIN — FAMOTIDINE 20 MG: 20 TABLET ORAL at 08:52

## 2021-03-13 RX ADMIN — STANDARDIZED SENNA CONCENTRATE 8.6 MG: 8.6 TABLET ORAL at 08:52

## 2021-03-13 RX ADMIN — OXYCODONE HYDROCHLORIDE AND ACETAMINOPHEN 1000 MG: 500 TABLET ORAL at 20:29

## 2021-03-13 RX ADMIN — MELATONIN 3 MG: at 20:29

## 2021-03-13 RX ADMIN — DEXAMETHASONE SODIUM PHOSPHATE 6 MG: 4 INJECTION INTRA-ARTICULAR; INTRALESIONAL; INTRAMUSCULAR; INTRAVENOUS; SOFT TISSUE at 20:30

## 2021-03-13 RX ADMIN — DOCUSATE SODIUM 100 MG: 100 CAPSULE, LIQUID FILLED ORAL at 17:00

## 2021-03-13 RX ADMIN — RIVAROXABAN 20 MG: 20 TABLET, FILM COATED ORAL at 08:52

## 2021-03-13 RX ADMIN — METOPROLOL TARTRATE 25 MG: 25 TABLET, FILM COATED ORAL at 08:52

## 2021-03-13 NOTE — ASSESSMENT & PLAN NOTE
Patient with approximately two weeks of shortness of breath, fevers, chills, myalgias, diarrhea, poor PO intake, fatigue  Tested positive for COVID on 3/1/21 but had been having symptoms before that  Diarrhea has resolved  Per daughter, patient appeared more confused today  Patient satting 88% on room air on arrival to ED with improvement to 96% on 4L nasal cannula  - Labs: AST 77, ALT 79, albumin 2 6, lactic acid 2 5, procalcitonin 1 41, d-dimer 4 58, blood cultures pending, procal 1 41  -D-dimer trending downward, BNP elevated at 2900, no prior baseline  - Chest x-ray in the ED showing diffuse patchy infiltrates  - CT head showing acute pansinusitis, patient asymptomatic  - CTA showed no evidence of pulmonary emboli but with extensive ground-glass infiltrates related to COVID pneumonia and trace pleural effusions    - no smoking or drug history    Plan:   - COVID moderate pathway  - Completed Remedesiver  - continue Decadron, day 5/10  - completed abx tx  - trend inflammatory markers, procalc now normalized  - blood cultures negative  - chronic hepatitis panel negative  - anticoagulation with xarelto  - wean O2 as tolerated, currently on 10 L   - aggressive pulmonary hygeine  - continue to monitor on telemetry  - If patient is able to be weaned to 3 L will initiated home o2 eval for earlier discharge

## 2021-03-13 NOTE — ASSESSMENT & PLAN NOTE
AST trending down, ALT trending up 277  Suspect drug-induced as he was recently started on antibiotics as well as Lipitor  Lipitor discontinued, given that it takes 3 days to be eliminated completely will monitor Liver function for now  He completed his abx  Continue Tylenol low dose prn  Chronic hepatitis panel negative  Will continue to monitor  Continue to trend LFTs, will consider further workup if liver enzymes continue to increase

## 2021-03-13 NOTE — PROGRESS NOTES
INTERNAL MEDICINE RESIDENCY PROGRESS NOTE     Name: Andreina Salvador   Age & Sex: 66 y o  male   MRN: [id number]  Unit/Bed#: -01   Encounter: [id number]  Team: SOD Team B     PATIENT INFORMATION     Name: Andreina Salvador   Age & Sex: 66 y o  male   MRN: [id number]  Hospital Stay Days: 6    ASSESSMENT/PLAN     Principal Problem:    Pneumonia due to COVID-19 virus  Active Problems:    Atrial fibrillation with RVR ([address] )    Acute respiratory failure with hypoxia (HCC)    Transaminitis    Lactic acidosis      Lactic acidosis  Assessment & Plan  Down trending in the setting of sepsis secondary to COVID-19 pneumonia  Last checked 2 5  Has received over 2 L of IV resuscitation fluids  Other possible etiologies include heart failure though he appears euvolemic on exam     Echocardiogram largely unremarkable  Patient improving, no need to trend    Transaminitis  Assessment & Plan  AST trending down, ALT trending up 277  Suspect drug-induced as he was recently started on antibiotics as well as Lipitor  Lipitor discontinued, given that it takes 3 days to be eliminated completely will monitor Liver function for now  He completed his abx  Continue Tylenol low dose prn  Chronic hepatitis panel negative  Will continue to monitor  Continue to trend LFTs, will consider further workup if liver enzymes continue to increase      Acute respiratory failure with hypoxia (HCC)  Assessment & Plan  Due to COVID pneumonia, possible heart failure  Chest x-ray appeared grossly volume overloaded  Appears euvolemic on exam  BNP however was elevated at 2900, no prior baseline  Echocardiogram largely unremarkable with low normal EF  Continue to wean oxygen, currently on 10 L  Diuresis with Lasix 40 mg QD, may not require on discharge  Critical care is aware of patient in case patient decompensates    Atrial fibrillation with RVR (HCC)  Assessment & Plan  Currently stable  - TSH within normal limits  - CTA no evidence of PE  - Possibly due to acute COVID infection    Plan:  - continue Lopressor 25  Q 12 hours scheduled  Withhold parameters  - rate currently controlled between 70s to 80s with periods of tachycardia  - cardiology following, appreciated, continue digoxin  - continue Xarelto QD  - closely monitor HR and blood pressures on telemetry  * Pneumonia due to COVID-19 virus  Assessment & Plan  Patient with approximately two weeks of shortness of breath, fevers, chills, myalgias, diarrhea, poor PO intake, fatigue  Tested positive for COVID on 3/1/21 but had been having symptoms before that  Diarrhea has resolved  Per daughter, patient appeared more confused today  Patient satting 88% on room air on arrival to ED with improvement to 96% on 4L nasal cannula  - Labs: AST 77, ALT 79, albumin 2 6, lactic acid 2 5, procalcitonin 1 41, d-dimer 4 58, blood cultures pending, procal 1 41  -D-dimer trending downward, BNP elevated at 2900, no prior baseline  - Chest x-ray in the ED showing diffuse patchy infiltrates  - CT head showing acute pansinusitis, patient asymptomatic  - CTA showed no evidence of pulmonary emboli but with extensive ground-glass infiltrates related to COVID pneumonia and trace pleural effusions  - no smoking or drug history    Plan:   - COVID moderate pathway  - Completed Remedesiver  - continue Decadron, day 5/10  - completed abx tx  - trend inflammatory markers, procalc now normalized  - blood cultures negative  - chronic hepatitis panel negative  - anticoagulation with xarelto  - wean O2 as tolerated, currently on 10 L   - aggressive pulmonary hygeine  - continue to monitor on telemetry  - If patient is able to be weaned to 3 L will initiated home o2 eval for earlier discharge    Sepsis (HCC)-resolved as of 3/13/2021  Assessment & Plan  Secondary to COVID-19 pneumonia  Resolving  Status post 2 L IV fluid boluses, no being diuresed for possible overload    Improving respiratory status possibly due to volume overload secondary to IV fluid administration   Diuresis as described above   Management of COVID-19 as above        Disposition:  Continue inpatient management    SUBJECTIVE     Patient seen and examined  No acute events overnight  Patient reports his breathing is better  He denies any chest pain, nausea, vomiting  Tolerating p o  Diet  No changes in bowel or bladder  Rest of ROS was negative  [employer] interpretor was used  OBJECTIVE     Vitals:    21 0300 21 0301 21 0500 21 0718   BP: 101/61      Pulse: 84      Resp:       Temp: (!) 96 2 °F (35 7 °C)      TempSrc: Axillary      SpO2: 92% 93%  91%   Weight:   63 2 kg (139 lb 6 4 oz)    Height:          Temperature:   Temp (24hrs), Av °F (36 1 °C), Min:96 2 °F (35 7 °C), Max:97 4 °F (36 3 °C)    Temperature: (!) 96 2 °F (35 7 °C)  Intake & Output:  I/O        07 -  0700  07 -  0700  07 -  0700    P  O  800 680     Total Intake(mL/kg) 800 (11 9) 680 (10 8)     Urine (mL/kg/hr) 2550 (1 6) 1400 (0 9)     Stool 0      Total Output 2550 1400     Net -1750 -720            Unmeasured Stool Occurrence 1 x          Weights:   IBW: 68 4 kg    Body mass index is 21 2 kg/m²  Weight (last 2 days)     Date/Time   Weight    21 0500   63 2 (139 4)            Physical Exam  Constitutional:       General: He is not in acute distress  Appearance: Normal appearance  He is not ill-appearing  Interventions: Nasal cannula in place  HENT:      Head: Normocephalic and atraumatic  Mouth/Throat:      Mouth: Mucous membranes are moist    Eyes:      Extraocular Movements: Extraocular movements intact  Conjunctiva/sclera: Conjunctivae normal       Pupils: Pupils are equal, round, and reactive to light  Cardiovascular:      Rate and Rhythm: Normal rate and regular rhythm  Pulses: Normal pulses  Heart sounds: Normal heart sounds  No murmur  No friction rub  No gallop      Pulmonary:      Effort: Pulmonary effort is normal  No respiratory distress  Breath sounds: Normal breath sounds  No wheezing or rales  Abdominal:      General: Abdomen is flat  Bowel sounds are normal  There is no distension  Palpations: Abdomen is soft  Tenderness: There is no abdominal tenderness  There is no guarding  Musculoskeletal:      Right lower leg: No edema  Left lower leg: No edema  Skin:     General: Skin is warm and dry  Neurological:      General: No focal deficit present  Mental Status: He is alert and oriented to person, place, and time  Psychiatric:         Mood and Affect: Mood normal          Behavior: Behavior normal        LABORATORY DATA     Labs: I have personally reviewed pertinent reports  Results from last 7 days   Lab Units 03/13/21  0453 03/12/21  0512 03/11/21  0451 03/07/21  1810   WBC Thousand/uL 13 27* 11 51* 10 00   < > 9 48   HEMOGLOBIN g/dL 13 5 12 7 11 9*   < > 12 1   HEMATOCRIT % 44 5 40 7 38 4   < > 38 8   PLATELETS Thousands/uL 283 257 230   < > 264   NEUTROS PCT %  --  87*  --   --   --    MONOS PCT %  --  3*  --   --   --    MONO PCT % 3*  --   --   --  1*    < > = values in this interval not displayed  Results from last 7 days   Lab Units 03/13/21  0454 03/12/21  0512 03/11/21  0451   POTASSIUM mmol/L 4 3 4 2 4 4   CHLORIDE mmol/L 104 104 107   CO2 mmol/L 26 26 25   BUN mg/dL 34* 24 20   CREATININE mg/dL 0 81 0 72 0 78   CALCIUM mg/dL 8 3 8 4 8 2*   ALK PHOS U/L 107 90 92   ALT U/L 277* 219* 207*   AST U/L 93* 96* 148*     Results from last 7 days   Lab Units 03/07/21  1810   MAGNESIUM mg/dL 2 4          Results from last 7 days   Lab Units 03/09/21  1052 03/09/21  0243 03/08/21  1819  03/07/21  1810   INR   --   --   --   --  1 23*   PTT seconds 79* 58* 54*   < > 32    < > = values in this interval not displayed       Results from last 7 days   Lab Units 03/09/21  0453   LACTIC ACID mmol/L 2 5*     Results from last 7 days   Lab Units 03/07/21  1814   TROPONIN I ng/mL <0 02       IMAGING & DIAGNOSTIC TESTING     Radiology Results: I have personally reviewed pertinent reports  Xr Chest Portable    Result Date: 3/10/2021  Impression: Worsening of bilateral pulmonary infiltrates Workstation performed: [id number]     Xr Chest 1 View Portable    Result Date: 3/8/2021  Impression: Diffuse patchy groundglass opacity throughout the lung fields  No effusions or pneumothorax  In the setting of clinically suspected/proven COVID-19, this plain film appearance while nonspecific, can be seen in cases of viral pneumonia such as COVID-19  Workstation performed: [id number]     Ct Head Without Contrast    Result Date: 3/7/2021  Impression: Acute pansinusitis  No sign of an acute transcortical infarction  No acute intracranial hemorrhage  Workstation performed: [id number]     Cta Ed Chest Pe Study    Result Date: 3/7/2021  Impression: No evidence of pulmonary emboli  Extensive groundglass infiltrates related to Covid pneumonia  Trace pleural effusions  Mild mediastinal and hilar adenopathy Workstation performed: [id number]     Other Diagnostic Testing: I have personally reviewed pertinent reports      ACTIVE MEDICATIONS     Current Facility-Administered Medications   Medication Dose Route Frequency    acetaminophen (TYLENOL) tablet 650 mg  650 mg Oral Q6H PRN    ascorbic acid (VITAMIN C) tablet 1,000 mg  1,000 mg Oral Q12H [facility]    cholecalciferol (VITAMIN D3) tablet 2,000 Units  2,000 Units Oral Daily    dexamethasone (DECADRON) injection 6 mg  6 mg Intravenous Q24H    digoxin (LANOXIN) tablet 125 mcg  125 mcg Oral Daily    docusate sodium (COLACE) capsule 100 mg  100 mg Oral BID    famotidine (PEPCID) tablet 20 mg  20 mg Oral BID    furosemide (LASIX) tablet 40 mg  40 mg Oral Daily    melatonin tablet 3 mg  3 mg Oral HS    metoprolol tartrate (LOPRESSOR) tablet 25 mg  25 mg Oral Q12H [facility]    zinc sulfate (ZINCATE) capsule 220 mg  220 mg Oral Daily    Followed by   Tanna Lopez ON 3/15/2021] multivitamin-minerals (CENTRUM ADULTS) tablet 1 tablet  1 tablet Oral Daily    ondansetron (ZOFRAN) injection 4 mg  4 mg Intravenous Q6H PRN    rivaroxaban (XARELTO) tablet 20 mg  20 mg Oral Daily With Breakfast    senna (SENOKOT) tablet 8 6 mg  1 tablet Oral Daily       VTE Pharmacologic Prophylaxis: Reason for no pharmacologic prophylaxis Xarelto  VTE Mechanical Prophylaxis: sequential compression device    Portions of the record may have been created with voice recognition software  Occasional wrong word or "sound a like" substitutions may have occurred due to the inherent limitations of voice recognition software    Read the chart carefully and recognize, using context, where substitutions have occurred   ==  Lupe Lawton, [address] [facility]  Internal Medicine Residency PGY-2

## 2021-03-13 NOTE — ASSESSMENT & PLAN NOTE
Currently stable  - TSH within normal limits  - CTA no evidence of PE  - Possibly due to acute COVID infection    Plan:  - continue Lopressor 25  Q 12 hours scheduled  Withhold parameters  - rate currently controlled between 70s to 80s with periods of tachycardia  - cardiology following, appreciated, continue digoxin  - continue Xarelto QD  - closely monitor HR and blood pressures on telemetry

## 2021-03-13 NOTE — ASSESSMENT & PLAN NOTE
Down trending in the setting of sepsis secondary to COVID-19 pneumonia  Last checked 2 5  Has received over 2 L of IV resuscitation fluids  Other possible etiologies include heart failure though he appears euvolemic on exam     Echocardiogram largely unremarkable  Patient improving, no need to trend

## 2021-03-13 NOTE — ASSESSMENT & PLAN NOTE
Due to COVID pneumonia, possible heart failure  Chest x-ray appeared grossly volume overloaded  Appears euvolemic on exam  BNP however was elevated at 2900, no prior baseline  Echocardiogram largely unremarkable with low normal EF  Continue to wean oxygen, currently on 10 L  Diuresis with Lasix 40 mg QD, may not require on discharge  Critical care is aware of patient in case patient decompensates

## 2021-03-14 LAB
ALBUMIN SERPL BCP-MCNC: 2.4 G/DL (ref 3.5–5)
ALP SERPL-CCNC: 110 U/L (ref 46–116)
ALT SERPL W P-5'-P-CCNC: 255 U/L (ref 12–78)
ANION GAP SERPL CALCULATED.3IONS-SCNC: 4 MMOL/L (ref 4–13)
AST SERPL W P-5'-P-CCNC: 75 U/L (ref 5–45)
BILIRUB SERPL-MCNC: 0.73 MG/DL (ref 0.2–1)
BUN SERPL-MCNC: 36 MG/DL (ref 5–25)
CALCIUM ALBUM COR SERPL-MCNC: 9.6 MG/DL (ref 8.3–10.1)
CALCIUM SERPL-MCNC: 8.3 MG/DL (ref 8.3–10.1)
CHLORIDE SERPL-SCNC: 109 MMOL/L (ref 100–108)
CO2 SERPL-SCNC: 28 MMOL/L (ref 21–32)
CREAT SERPL-MCNC: 0.85 MG/DL (ref 0.6–1.3)
ERYTHROCYTE [DISTWIDTH] IN BLOOD BY AUTOMATED COUNT: 17.6 % (ref 11.6–15.1)
GFR SERPL CREATININE-BSD FRML MDRD: 84 ML/MIN/1.73SQ M
GLUCOSE SERPL-MCNC: 134 MG/DL (ref 65–140)
HCT VFR BLD AUTO: 43.7 % (ref 36.5–49.3)
HGB BLD-MCNC: 13.3 G/DL (ref 12–17)
MCH RBC QN AUTO: 25 PG (ref 26.8–34.3)
MCHC RBC AUTO-ENTMCNC: 30.4 G/DL (ref 31.4–37.4)
MCV RBC AUTO: 82 FL (ref 82–98)
NRBC BLD AUTO-RTO: 0 /100 WBCS
PLATELET # BLD AUTO: 286 THOUSANDS/UL (ref 149–390)
PMV BLD AUTO: 11.2 FL (ref 8.9–12.7)
POTASSIUM SERPL-SCNC: 4.8 MMOL/L (ref 3.5–5.3)
PROT SERPL-MCNC: 6.6 G/DL (ref 6.4–8.2)
RBC # BLD AUTO: 5.32 MILLION/UL (ref 3.88–5.62)
SODIUM SERPL-SCNC: 141 MMOL/L (ref 136–145)
WBC # BLD AUTO: 13.75 THOUSAND/UL (ref 4.31–10.16)

## 2021-03-14 PROCEDURE — 85027 COMPLETE CBC AUTOMATED: CPT | Performed by: STUDENT IN AN ORGANIZED HEALTH CARE EDUCATION/TRAINING PROGRAM

## 2021-03-14 PROCEDURE — 94760 N-INVAS EAR/PLS OXIMETRY 1: CPT

## 2021-03-14 PROCEDURE — 80053 COMPREHEN METABOLIC PANEL: CPT | Performed by: STUDENT IN AN ORGANIZED HEALTH CARE EDUCATION/TRAINING PROGRAM

## 2021-03-14 RX ADMIN — OXYCODONE HYDROCHLORIDE AND ACETAMINOPHEN 1000 MG: 500 TABLET ORAL at 09:10

## 2021-03-14 RX ADMIN — FAMOTIDINE 20 MG: 20 TABLET ORAL at 17:25

## 2021-03-14 RX ADMIN — FAMOTIDINE 20 MG: 20 TABLET ORAL at 09:10

## 2021-03-14 RX ADMIN — RIVAROXABAN 20 MG: 20 TABLET, FILM COATED ORAL at 09:10

## 2021-03-14 RX ADMIN — FUROSEMIDE 40 MG: 40 TABLET ORAL at 09:10

## 2021-03-14 RX ADMIN — MELATONIN 3 MG: at 21:18

## 2021-03-14 RX ADMIN — ZINC SULFATE 220 MG (50 MG) CAPSULE 220 MG: CAPSULE at 09:10

## 2021-03-14 RX ADMIN — METOPROLOL TARTRATE 25 MG: 25 TABLET, FILM COATED ORAL at 09:10

## 2021-03-14 RX ADMIN — DIGOXIN 125 MCG: 125 TABLET ORAL at 09:10

## 2021-03-14 RX ADMIN — DOCUSATE SODIUM 100 MG: 100 CAPSULE, LIQUID FILLED ORAL at 09:10

## 2021-03-14 RX ADMIN — Medication 2000 UNITS: at 09:10

## 2021-03-14 RX ADMIN — DEXAMETHASONE SODIUM PHOSPHATE 6 MG: 4 INJECTION INTRA-ARTICULAR; INTRALESIONAL; INTRAMUSCULAR; INTRAVENOUS; SOFT TISSUE at 21:19

## 2021-03-14 RX ADMIN — METOPROLOL TARTRATE 25 MG: 25 TABLET, FILM COATED ORAL at 21:19

## 2021-03-14 RX ADMIN — STANDARDIZED SENNA CONCENTRATE 8.6 MG: 8.6 TABLET ORAL at 09:10

## 2021-03-14 RX ADMIN — DOCUSATE SODIUM 100 MG: 100 CAPSULE, LIQUID FILLED ORAL at 17:25

## 2021-03-14 NOTE — CASE MANAGEMENT
Pt is currently on [address] and IV fluids  Completed course of remdesivir  Pt needs to be weaned down on 02 first, then to receive home o2 eval  Recs remain for pt to return home w/o needs once medically stable  Cm will continue to follow

## 2021-03-14 NOTE — PROGRESS NOTES
INTERNAL MEDICINE RESIDENCY PROGRESS NOTE     Name: Carlos Heredia   Age & Sex: 66 y o  male   MRN: [id number]  Unit/Bed#: -01   Encounter: [id number]  Team: SOD Team B     PATIENT INFORMATION     Name: Carlos Heredia   Age & Sex: 66 y o  male   MRN: [id number]  Hospital Stay Days: 7    ASSESSMENT/PLAN     Lactic acidosis  Assessment & Plan  Down trending in the setting of sepsis secondary to COVID-19 pneumonia  Last checked 2 5  Has received over 2 L of IV resuscitation fluids  Other possible etiologies include heart failure though he appears euvolemic on exam     Echocardiogram largely unremarkable  Patient improving, no need to trend     Transaminitis  Assessment & Plan  AST trending down, ALT trending up 277  Suspect drug-induced as he was recently started on antibiotics as well as Lipitor  Lipitor discontinued, given that it takes 3 days to be eliminated completely will monitor Liver function for now     He completed his abx  Continue Tylenol low dose prn  Chronic hepatitis panel negative  Will continue to monitor  Continue to trend LFTs, will consider further workup if liver enzymes continue to increase      Acute respiratory failure with hypoxia (HCC)  Assessment & Plan  Due to COVID pneumonia, possible heart failure  Chest x-ray appeared grossly volume overloaded  Appears euvolemic on exam  BNP however was elevated at 2900, no prior baseline  Echocardiogram largely unremarkable with low normal EF  Continue to wean oxygen, currently on 10 L  Diuresis with Lasix 40 mg QD, may not require on discharge  Critical care is aware of patient in case patient decompensates     Atrial fibrillation with RVR (HCC)  Assessment & Plan  Currently stable  - TSH within normal limits  - CTA no evidence of PE  - Possibly due to acute COVID infection     Plan:  - continue Lopressor 25  Q 12 hours scheduled  Withhold parameters  - rate currently controlled between 70s to 80s with periods of tachycardia  - cardiology following, appreciated, continue digoxin  - continue Xarelto QD  - closely monitor HR and blood pressures on telemetry      * Pneumonia due to COVID-19 virus  Assessment & Plan  Patient with approximately two weeks of shortness of breath, fevers, chills, myalgias, diarrhea, poor PO intake, fatigue  Tested positive for COVID on 3/1/21 but had been having symptoms before that  Diarrhea has resolved  Per daughter, patient appeared more confused today  Patient satting 88% on room air on arrival to ED with improvement to 96% on 4L nasal cannula  - Labs: AST 77, ALT 79, albumin 2 6, lactic acid 2 5, procalcitonin 1 41, d-dimer 4 58, blood cultures pending, procal 1 41  -D-dimer trending downward, BNP elevated at 2900, no prior baseline  - Chest x-ray in the ED showing diffuse patchy infiltrates  - CT head showing acute pansinusitis, patient asymptomatic  - CTA showed no evidence of pulmonary emboli but with extensive ground-glass infiltrates related to COVID pneumonia and trace pleural effusions  - no smoking or drug history     Plan:   - COVID moderate pathway  - Completed Remedesiver  - continue Decadron, day 5/10  - completed abx tx  - trend inflammatory markers, procalc now normalized  - blood cultures negative  - chronic hepatitis panel negative  - anticoagulation with xarelto  - wean O2 as tolerated, currently on 10 L   - aggressive pulmonary hygeine  - continue to monitor on telemetry  - If patient is able to be weaned to 3 L will initiated home o2 eval for earlier discharge     Sepsis (HCC)-resolved as of 3/13/2021  Assessment & Plan  Secondary to COVID-19 pneumonia  Resolving  Status post 2 L IV fluid boluses, no being diuresed for possible overload    Improving respiratory status possibly due to volume overload secondary to IV fluid administration   Diuresis as described above   Management of COVID-19 as above      Disposition: Continue inpatient management for COVID-19 viral pneumonia    SUBJECTIVE Patient seen and examined at bedside  No acute events overnight  Denies chest pain, SOB, diaphoresis, nausea/vomiting/diarrhea, fevers/chills  Saturating well on 10 L NC  OBJECTIVE     Vitals:    21 0316 21 0558 21 0726 21 0746   BP: 106/62   112/67   BP Location:    Left arm   Pulse: 65   76   Resp:    18   Temp:    (!) 97 2 °F (36 2 °C)   TempSrc:    Oral   SpO2: 97%  98% 97%   Weight:  58 4 kg (128 lb 12 8 oz)     Height:          Temperature:   Temp (24hrs), Av 2 °F (36 2 °C), Min:96 5 °F (35 8 °C), Max:97 8 °F (36 6 °C)    Temperature: (!) 97 2 °F (36 2 °C)  Intake & Output:  I/O        0701 -  0700  07 -  0700  07 -  0700    P  O  800 680     Total Intake(mL/kg) 800 (11 9) 680 (10 8)     Urine (mL/kg/hr) 2550 (1 6) 1400 (0 9)     Stool 0      Total Output 2550 1400     Net -1750 -720            Unmeasured Stool Occurrence 1 x          Weights:   IBW: 68 4 kg    Body mass index is 19 58 kg/m²  Weight (last 2 days)     Date/Time   Weight    21 0558   58 4 (128 8)    21 0500   63 2 (139 4)            Physical Exam  Constitutional:       General: He is not in acute distress  Appearance: Normal appearance  He is not ill-appearing  Interventions: Nasal cannula in place  HENT:      Head: Normocephalic and atraumatic  Mouth/Throat:      Mouth: Mucous membranes are moist    Eyes:      Extraocular Movements: Extraocular movements intact  Conjunctiva/sclera: Conjunctivae normal       Pupils: Pupils are equal, round, and reactive to light  Cardiovascular:      Rate and Rhythm: Normal rate and regular rhythm  Pulses: Normal pulses  Heart sounds: Normal heart sounds  No murmur  No friction rub  No gallop  Pulmonary:      Effort: Pulmonary effort is normal  No respiratory distress  Breath sounds: Normal breath sounds  No wheezing or rales  Abdominal:      General: Abdomen is flat   Bowel sounds are normal  There is no distension  Palpations: Abdomen is soft  Tenderness: There is no abdominal tenderness  There is no guarding  Musculoskeletal:      Right lower leg: No edema  Left lower leg: No edema  Skin:     General: Skin is warm and dry  Neurological:      General: No focal deficit present  Mental Status: He is alert and oriented to person, place, and time  Psychiatric:         Mood and Affect: Mood normal          Behavior: Behavior normal        LABORATORY DATA     Labs: I have personally reviewed pertinent reports  Results from last 7 days   Lab Units 03/14/21 0440 03/13/21  0453 03/12/21  0512  03/07/21  1810   WBC Thousand/uL 13 75* 13 27* 11 51*   < > 9 48   HEMOGLOBIN g/dL 13 3 13 5 12 7   < > 12 1   HEMATOCRIT % 43 7 44 5 40 7   < > 38 8   PLATELETS Thousands/uL 286 283 257   < > 264   NEUTROS PCT %  --   --  87*  --   --    MONOS PCT %  --   --  3*  --   --    MONO PCT %  --  3*  --   --  1*    < > = values in this interval not displayed  Results from last 7 days   Lab Units 03/14/21 0440 03/13/21 0454 03/12/21  0512   POTASSIUM mmol/L 4 8 4 3 4 2   CHLORIDE mmol/L 109* 104 104   CO2 mmol/L 28 26 26   BUN mg/dL 36* 34* 24   CREATININE mg/dL 0 85 0 81 0 72   CALCIUM mg/dL 8 3 8 3 8 4   ALK PHOS U/L 110 107 90   ALT U/L 255* 277* 219*   AST U/L 75* 93* 96*     Results from last 7 days   Lab Units 03/07/21  1810   MAGNESIUM mg/dL 2 4          Results from last 7 days   Lab Units 03/09/21  1052 03/09/21  0243 03/08/21  1819  03/07/21  1810   INR   --   --   --   --  1 23*   PTT seconds 79* 58* 54*   < > 32    < > = values in this interval not displayed  Results from last 7 days   Lab Units 03/09/21  0453   LACTIC ACID mmol/L 2 5*     Results from last 7 days   Lab Units 03/07/21  1814   TROPONIN I ng/mL <0 02       IMAGING & DIAGNOSTIC TESTING     Radiology Results: I have personally reviewed pertinent reports    Xr Chest Portable    Result Date: 3/10/2021  Impression: Worsening of bilateral pulmonary infiltrates Workstation performed: [id number]     Xr Chest 1 View Portable    Result Date: 3/8/2021  Impression: Diffuse patchy groundglass opacity throughout the lung fields  No effusions or pneumothorax  In the setting of clinically suspected/proven COVID-19, this plain film appearance while nonspecific, can be seen in cases of viral pneumonia such as COVID-19  Workstation performed: [id number]     Ct Head Without Contrast    Result Date: 3/7/2021  Impression: Acute pansinusitis  No sign of an acute transcortical infarction  No acute intracranial hemorrhage  Workstation performed: [id number]     Cta Ed Chest Pe Study    Result Date: 3/7/2021  Impression: No evidence of pulmonary emboli  Extensive groundglass infiltrates related to Covid pneumonia  Trace pleural effusions  Mild mediastinal and hilar adenopathy Workstation performed: [id number]     Other Diagnostic Testing: I have personally reviewed pertinent reports      ACTIVE MEDICATIONS     Current Facility-Administered Medications   Medication Dose Route Frequency    acetaminophen (TYLENOL) tablet 650 mg  650 mg Oral Q6H PRN    ascorbic acid (VITAMIN C) tablet 1,000 mg  1,000 mg Oral Q12H [facility]    cholecalciferol (VITAMIN D3) tablet 2,000 Units  2,000 Units Oral Daily    dexamethasone (DECADRON) injection 6 mg  6 mg Intravenous Q24H    digoxin (LANOXIN) tablet 125 mcg  125 mcg Oral Daily    docusate sodium (COLACE) capsule 100 mg  100 mg Oral BID    famotidine (PEPCID) tablet 20 mg  20 mg Oral BID    furosemide (LASIX) tablet 40 mg  40 mg Oral Daily    melatonin tablet 3 mg  3 mg Oral HS    metoprolol tartrate (LOPRESSOR) tablet 25 mg  25 mg Oral Q12H [facility]    zinc sulfate (ZINCATE) capsule 220 mg  220 mg Oral Daily    Followed by   Shaheen Brizuela ON 3/15/2021] multivitamin-minerals (CENTRUM ADULTS) tablet 1 tablet  1 tablet Oral Daily    ondansetron (ZOFRAN) injection 4 mg  4 mg Intravenous Q6H PRN    rivaroxaban (XARELTO) tablet 20 mg  20 mg Oral Daily With Breakfast    senna (SENOKOT) tablet 8 6 mg  1 tablet Oral Daily       VTE Pharmacologic Prophylaxis: Reason for no pharmacologic prophylaxis Xarelto  VTE Mechanical Prophylaxis: sequential compression device    Portions of the record may have been created with voice recognition software  Occasional wrong word or "sound a like" substitutions may have occurred due to the inherent limitations of voice recognition software    Read the chart carefully and recognize, using context, where substitutions have occurred   ==  Tessie Corrales, DO  [address]  Internal Medicine Residency PGY-3

## 2021-03-14 NOTE — RESTORATIVE TECHNICIAN NOTE
Restorative Specialist Mobility Note       Activity: Ambulate in room, Stand at bedside, Other (Comment)(back to bed)     Assistive Device: None        Repositioned: Semi Fidel

## 2021-03-15 PROBLEM — E87.2 LACTIC ACIDOSIS: Status: RESOLVED | Noted: 2021-03-09 | Resolved: 2021-03-15

## 2021-03-15 LAB
ANION GAP SERPL CALCULATED.3IONS-SCNC: 5 MMOL/L (ref 4–13)
BUN SERPL-MCNC: 34 MG/DL (ref 5–25)
CALCIUM SERPL-MCNC: 8.5 MG/DL (ref 8.3–10.1)
CHLORIDE SERPL-SCNC: 106 MMOL/L (ref 100–108)
CO2 SERPL-SCNC: 26 MMOL/L (ref 21–32)
CREAT SERPL-MCNC: 0.83 MG/DL (ref 0.6–1.3)
ERYTHROCYTE [DISTWIDTH] IN BLOOD BY AUTOMATED COUNT: 17.8 % (ref 11.6–15.1)
GFR SERPL CREATININE-BSD FRML MDRD: 85 ML/MIN/1.73SQ M
GLUCOSE SERPL-MCNC: 135 MG/DL (ref 65–140)
HCT VFR BLD AUTO: 43.2 % (ref 36.5–49.3)
HGB BLD-MCNC: 13 G/DL (ref 12–17)
MAGNESIUM SERPL-MCNC: 2.4 MG/DL (ref 1.6–2.6)
MCH RBC QN AUTO: 25 PG (ref 26.8–34.3)
MCHC RBC AUTO-ENTMCNC: 30.1 G/DL (ref 31.4–37.4)
MCV RBC AUTO: 83 FL (ref 82–98)
NRBC BLD AUTO-RTO: 0 /100 WBCS
PHOSPHATE SERPL-MCNC: 3.5 MG/DL (ref 2.3–4.1)
PLATELET # BLD AUTO: 327 THOUSANDS/UL (ref 149–390)
PMV BLD AUTO: 11.2 FL (ref 8.9–12.7)
POTASSIUM SERPL-SCNC: 4.8 MMOL/L (ref 3.5–5.3)
RBC # BLD AUTO: 5.19 MILLION/UL (ref 3.88–5.62)
SODIUM SERPL-SCNC: 137 MMOL/L (ref 136–145)
WBC # BLD AUTO: 13.43 THOUSAND/UL (ref 4.31–10.16)

## 2021-03-15 PROCEDURE — 84100 ASSAY OF PHOSPHORUS: CPT | Performed by: INTERNAL MEDICINE

## 2021-03-15 PROCEDURE — 80048 BASIC METABOLIC PNL TOTAL CA: CPT | Performed by: INTERNAL MEDICINE

## 2021-03-15 PROCEDURE — 83735 ASSAY OF MAGNESIUM: CPT | Performed by: INTERNAL MEDICINE

## 2021-03-15 PROCEDURE — 94760 N-INVAS EAR/PLS OXIMETRY 1: CPT

## 2021-03-15 PROCEDURE — 85027 COMPLETE CBC AUTOMATED: CPT | Performed by: INTERNAL MEDICINE

## 2021-03-15 RX ADMIN — METOPROLOL TARTRATE 25 MG: 25 TABLET, FILM COATED ORAL at 21:14

## 2021-03-15 RX ADMIN — STANDARDIZED SENNA CONCENTRATE 8.6 MG: 8.6 TABLET ORAL at 08:19

## 2021-03-15 RX ADMIN — DEXAMETHASONE SODIUM PHOSPHATE 6 MG: 4 INJECTION INTRA-ARTICULAR; INTRALESIONAL; INTRAMUSCULAR; INTRAVENOUS; SOFT TISSUE at 21:07

## 2021-03-15 RX ADMIN — Medication 1 TABLET: at 08:19

## 2021-03-15 RX ADMIN — DIGOXIN 125 MCG: 125 TABLET ORAL at 08:19

## 2021-03-15 RX ADMIN — RIVAROXABAN 20 MG: 20 TABLET, FILM COATED ORAL at 08:19

## 2021-03-15 RX ADMIN — DOCUSATE SODIUM 100 MG: 100 CAPSULE, LIQUID FILLED ORAL at 08:20

## 2021-03-15 RX ADMIN — FUROSEMIDE 40 MG: 40 TABLET ORAL at 08:19

## 2021-03-15 RX ADMIN — FAMOTIDINE 20 MG: 20 TABLET ORAL at 08:19

## 2021-03-15 RX ADMIN — DOCUSATE SODIUM 100 MG: 100 CAPSULE, LIQUID FILLED ORAL at 17:58

## 2021-03-15 RX ADMIN — FAMOTIDINE 20 MG: 20 TABLET ORAL at 17:58

## 2021-03-15 RX ADMIN — Medication 2000 UNITS: at 08:19

## 2021-03-15 RX ADMIN — MELATONIN 3 MG: at 22:00

## 2021-03-15 RX ADMIN — METOPROLOL TARTRATE 25 MG: 25 TABLET, FILM COATED ORAL at 08:20

## 2021-03-15 NOTE — ASSESSMENT & PLAN NOTE
Resolving  Suspect drug-induced as he was recently started on antibiotics as well as Lipitor  Lipitor discontinued, given that it takes 3 days to be eliminated completely will monitor Liver function for now  He completed his abx  Continue Tylenol low dose prn  Chronic hepatitis panel negative  Will continue to monitor  Continue to trend LFTs, will consider further workup if liver enzymes continue to increase

## 2021-03-15 NOTE — ASSESSMENT & PLAN NOTE
Patient with approximately two weeks of shortness of breath, fevers, chills, myalgias, diarrhea, poor PO intake, fatigue  Tested positive for COVID on 3/1/21 but had been having symptoms before that  Diarrhea has resolved  Per daughter, patient appeared more confused today  Patient satting 88% on room air on arrival to ED with improvement to 96% on 4L nasal cannula  - Labs: AST 77, ALT 79, albumin 2 6, lactic acid 2 5, procalcitonin 1 41, d-dimer 4 58, blood cultures pending, procal 1 41  -D-dimer trending downward, BNP elevated at 2900, no prior baseline  - Chest x-ray in the ED showing diffuse patchy infiltrates  - CT head showing acute pansinusitis, patient asymptomatic  - CTA showed no evidence of pulmonary emboli but with extensive ground-glass infiltrates related to COVID pneumonia and trace pleural effusions  - no smoking or drug history    Plan:   - COVID moderate pathway  - Completed Remedesiver  - continue Decadron, day 6/10  - completed abx tx with ceftriaxone and doxycycline for 5 days  - trend inflammatory markers, procalc now normalized  - blood cultures negative  - chronic hepatitis panel negative  - anticoagulation with xarelto  - wean O2 as tolerated, currently on 10 L   - aggressive pulmonary hygeine  - continue to monitor on telemetry  - If patient is able to be weaned to 3 L will initiated home o2 eval inset of all oxygen via case management for earlier discharge

## 2021-03-15 NOTE — ASSESSMENT & PLAN NOTE
Currently stable  - TSH within normal limits  - CTA no evidence of PE  - Possibly due to acute COVID infection    Plan:  - continue Lopressor 25  Q 12 hours scheduled  Withhold parameters  - rate currently controlled between 70s to 80s with periods of tachycardia  - cardiology following, appreciated, continue digoxin given his blood pressure  - continue Xarelto QD  - closely monitor HR and blood pressures

## 2021-03-15 NOTE — ASSESSMENT & PLAN NOTE
Due to COVID pneumonia, possible heart failure  Chest x-ray appeared grossly volume overloaded  Appears euvolemic on exam  BNP however was elevated at 2900, no prior baseline  Echocardiogram largely unremarkable with low normal EF  Continue to wean oxygen, currently on 10 L  Discussed with RT, wean patient to nasal cannula 6 L  Maintain O2 saturation greater than 88%  Diuresis with Lasix 40 mg QD, may not require on discharge  Critical care is aware of patient in case patient decompensates

## 2021-03-15 NOTE — PROGRESS NOTES
INTERNAL MEDICINE RESIDENCY PROGRESS NOTE     Name: Sal Garrison   Age & Sex: 66 y o  male   MRN: [id number]  Unit/Bed#: -01   Encounter: [id number]  Team: SOD Team B     PATIENT INFORMATION     Name: Sal Garrison   Age & Sex: 66 y o  male   MRN: [id number]  Hospital Stay Days: 8    ASSESSMENT/PLAN     Principal Problem:    Pneumonia due to COVID-19 virus  Active Problems:    Atrial fibrillation with RVR ([address] )    Acute respiratory failure with hypoxia ([address] )    Transaminitis      Transaminitis  Assessment & Plan  Resolving  Suspect drug-induced as he was recently started on antibiotics as well as Lipitor  Lipitor discontinued, given that it takes 3 days to be eliminated completely will monitor Liver function for now  He completed his abx  Continue Tylenol low dose prn  Chronic hepatitis panel negative  Will continue to monitor  Continue to trend LFTs, will consider further workup if liver enzymes continue to increase      Acute respiratory failure with hypoxia (HCC)  Assessment & Plan  Due to COVID pneumonia, possible heart failure  Chest x-ray appeared grossly volume overloaded  Appears euvolemic on exam  BNP however was elevated at 2900, no prior baseline  Echocardiogram largely unremarkable with low normal EF  Continue to wean oxygen, currently on 10 L  Discussed with RT, wean patient to nasal cannula 6 L  Maintain O2 saturation greater than 88%  Diuresis with Lasix 40 mg QD, may not require on discharge  Critical care is aware of patient in case patient decompensates    Atrial fibrillation with RVR (HCC)  Assessment & Plan  Currently stable  - TSH within normal limits  - CTA no evidence of PE  - Possibly due to acute COVID infection    Plan:  - continue Lopressor 25  Q 12 hours scheduled  Withhold parameters  - rate currently controlled between 70s to 80s with periods of tachycardia  - cardiology following, appreciated, continue digoxin given his blood pressure  - continue Xarelto QD  - closely monitor HR and blood pressures  * Pneumonia due to COVID-19 virus  Assessment & Plan  Patient with approximately two weeks of shortness of breath, fevers, chills, myalgias, diarrhea, poor PO intake, fatigue  Tested positive for COVID on 3/1/21 but had been having symptoms before that  Diarrhea has resolved  Per daughter, patient appeared more confused today  Patient satting 88% on room air on arrival to ED with improvement to 96% on 4L nasal cannula  - Labs: AST 77, ALT 79, albumin 2 6, lactic acid 2 5, procalcitonin 1 41, d-dimer 4 58, blood cultures pending, procal 1 41  -D-dimer trending downward, BNP elevated at 2900, no prior baseline  - Chest x-ray in the ED showing diffuse patchy infiltrates  - CT head showing acute pansinusitis, patient asymptomatic  - CTA showed no evidence of pulmonary emboli but with extensive ground-glass infiltrates related to COVID pneumonia and trace pleural effusions  - no smoking or drug history    Plan:   - COVID moderate pathway  - Completed Remedesiver  - continue Decadron, day 6/10  - completed abx tx with ceftriaxone and doxycycline for 5 days  - trend inflammatory markers, procalc now normalized  - blood cultures negative  - chronic hepatitis panel negative  - anticoagulation with xarelto  - wean O2 as tolerated, currently on 10 L   - aggressive pulmonary hygeine  - continue to monitor on telemetry  - If patient is able to be weaned to 3 L will initiated home o2 eval inset of all oxygen via case management for earlier discharge  Lactic acidosis-resolved as of 3/15/2021  Assessment & Plan  Down trending in the setting of sepsis secondary to COVID-19 pneumonia  Last checked 2 5  Has received over 2 L of IV resuscitation fluids  Other possible etiologies include heart failure though he appears euvolemic on exam     Echocardiogram largely unremarkable  Patient improving, no need to trend        Disposition:  Continue inpatient management    Will be weaned to nasal cannula today   Will order home O2 eval if stable in order to discharge earlier  Will inform Case Management  SUBJECTIVE     Patient seen and examined  No acute events overnight  Patient states he feels better  He denies any chest pain, shortness of breath  Tolerating p o  Diet  Rest of ROS was negative  [employer] interpretor was used  OBJECTIVE     Vitals:    21 2334 03/15/21 0600 03/15/21 0712 03/15/21 0720   BP: 99/61   111/55   BP Location: Left arm      Pulse: 77   99   Resp: 17   20   Temp: (!) 97 2 °F (36 2 °C)      TempSrc: Oral      SpO2: 98%  96% (!) 85%   Weight:  59 5 kg (131 lb 2 oz)     Height:          Temperature:   Temp (24hrs), Av 8 °F (36 6 °C), Min:97 2 °F (36 2 °C), Max:98 3 °F (36 8 °C)    Temperature: (!) 97 2 °F (36 2 °C)  Intake & Output:  I/O        0701 -  0700  07 - 03/15 0700 03/15 07 -  0700    P  O  120 360     Total Intake(mL/kg) 120 (2 1) 360 (6 1)     Urine (mL/kg/hr) 1450 (1) 450 (0 3)     Total Output 1450 450     Net -1330 -90            Unmeasured Urine Occurrence   1 x    Unmeasured Stool Occurrence   1 x        Weights:   IBW: 68 4 kg    Body mass index is 19 94 kg/m²  Weight (last 2 days)     Date/Time   Weight    03/15/21 0600   59 5 (131 13)    21 0558   58 4 (128 8)    21 0500   63 2 (139 4)            Physical Exam  Constitutional:       General: He is not in acute distress  Appearance: Normal appearance  He is not ill-appearing  Interventions: Nasal cannula in place  HENT:      Head: Normocephalic and atraumatic  Mouth/Throat:      Mouth: Mucous membranes are moist    Eyes:      Extraocular Movements: Extraocular movements intact  Conjunctiva/sclera: Conjunctivae normal       Pupils: Pupils are equal, round, and reactive to light  Cardiovascular:      Rate and Rhythm: Normal rate and regular rhythm  Pulses: Normal pulses  Heart sounds: Normal heart sounds  No murmur  No friction rub   No gallop  Pulmonary:      Effort: Pulmonary effort is normal  No respiratory distress  Breath sounds: Normal breath sounds  No wheezing or rales  Abdominal:      General: Abdomen is flat  Bowel sounds are normal  There is no distension  Palpations: Abdomen is soft  Tenderness: There is no abdominal tenderness  There is no guarding  Musculoskeletal:      Right lower leg: No edema  Left lower leg: No edema  Skin:     General: Skin is warm and dry  Neurological:      General: No focal deficit present  Mental Status: He is alert and oriented to person, place, and time  Psychiatric:         Mood and Affect: Mood normal          Behavior: Behavior normal        LABORATORY DATA     Labs: I have personally reviewed pertinent reports    Results from last 7 days   Lab Units 03/15/21  0503 03/14/21  0440 03/13/21  0453 03/12/21  0512   WBC Thousand/uL 13 43* 13 75* 13 27* 11 51*   HEMOGLOBIN g/dL 13 0 13 3 13 5 12 7   HEMATOCRIT % 43 2 43 7 44 5 40 7   PLATELETS Thousands/uL 327 286 283 257   NEUTROS PCT %  --   --   --  87*   MONOS PCT %  --   --   --  3*   MONO PCT %  --   --  3*  --       Results from last 7 days   Lab Units 03/15/21  0503 03/14/21  0440 03/13/21  0454 03/12/21  0512   POTASSIUM mmol/L 4 8 4 8 4 3 4 2   CHLORIDE mmol/L 106 109* 104 104   CO2 mmol/L 26 28 26 26   BUN mg/dL 34* 36* 34* 24   CREATININE mg/dL 0 83 0 85 0 81 0 72   CALCIUM mg/dL 8 5 8 3 8 3 8 4   ALK PHOS U/L  --  110 107 90   ALT U/L  --  255* 277* 219*   AST U/L  --  75* 93* 96*     Results from last 7 days   Lab Units 03/15/21  0503   MAGNESIUM mg/dL 2 4     Results from last 7 days   Lab Units 03/15/21  0503   PHOSPHORUS mg/dL 3 5      Results from last 7 days   Lab Units 03/09/21  1052 03/09/21  0243 03/08/21  1819   PTT seconds 79* 58* 54*     Results from last 7 days   Lab Units 03/09/21  0453   LACTIC ACID mmol/L 2 5*           IMAGING & DIAGNOSTIC TESTING     Radiology Results: I have personally reviewed pertinent reports  Xr Chest Portable    Result Date: 3/10/2021  Impression: Worsening of bilateral pulmonary infiltrates Workstation performed: [id number]     Xr Chest 1 View Portable    Result Date: 3/8/2021  Impression: Diffuse patchy groundglass opacity throughout the lung fields  No effusions or pneumothorax  In the setting of clinically suspected/proven COVID-19, this plain film appearance while nonspecific, can be seen in cases of viral pneumonia such as COVID-19  Workstation performed: [id number]     Ct Head Without Contrast    Result Date: 3/7/2021  Impression: Acute pansinusitis  No sign of an acute transcortical infarction  No acute intracranial hemorrhage  Workstation performed: [id number]     Cta Ed Chest Pe Study    Result Date: 3/7/2021  Impression: No evidence of pulmonary emboli  Extensive groundglass infiltrates related to Covid pneumonia  Trace pleural effusions  Mild mediastinal and hilar adenopathy Workstation performed: [id number]     Other Diagnostic Testing: I have personally reviewed pertinent reports      ACTIVE MEDICATIONS     Current Facility-Administered Medications   Medication Dose Route Frequency    acetaminophen (TYLENOL) tablet 650 mg  650 mg Oral Q6H PRN    cholecalciferol (VITAMIN D3) tablet 2,000 Units  2,000 Units Oral Daily    dexamethasone (DECADRON) injection 6 mg  6 mg Intravenous Q24H    digoxin (LANOXIN) tablet 125 mcg  125 mcg Oral Daily    docusate sodium (COLACE) capsule 100 mg  100 mg Oral BID    famotidine (PEPCID) tablet 20 mg  20 mg Oral BID    furosemide (LASIX) tablet 40 mg  40 mg Oral Daily    melatonin tablet 3 mg  3 mg Oral HS    metoprolol tartrate (LOPRESSOR) tablet 25 mg  25 mg Oral Q12H [address]    multivitamin-minerals (CENTRUM ADULTS) tablet 1 tablet  1 tablet Oral Daily    ondansetron (ZOFRAN) injection 4 mg  4 mg Intravenous Q6H PRN    rivaroxaban (XARELTO) tablet 20 mg  20 mg Oral Daily With Breakfast    senna (SENOKOT) tablet 8 6 mg  1 tablet Oral Daily       VTE Pharmacologic Prophylaxis: Reason for no pharmacologic prophylaxis Xarelto  VTE Mechanical Prophylaxis: sequential compression device    Portions of the record may have been created with voice recognition software  Occasional wrong word or "sound a like" substitutions may have occurred due to the inherent limitations of voice recognition software    Read the chart carefully and recognize, using context, where substitutions have occurred   ==  Diego Cuellar, [address] [place] [address]  Internal Medicine Residency PGY-2

## 2021-03-16 LAB
ALBUMIN SERPL BCP-MCNC: 2.5 G/DL (ref 3.5–5)
ALP SERPL-CCNC: 126 U/L (ref 46–116)
ALT SERPL W P-5'-P-CCNC: 333 U/L (ref 12–78)
ANION GAP SERPL CALCULATED.3IONS-SCNC: 5 MMOL/L (ref 4–13)
APTT PPP: 27 SECONDS (ref 23–37)
AST SERPL W P-5'-P-CCNC: 119 U/L (ref 5–45)
BILIRUB SERPL-MCNC: 0.83 MG/DL (ref 0.2–1)
BUN SERPL-MCNC: 30 MG/DL (ref 5–25)
CALCIUM ALBUM COR SERPL-MCNC: 10.1 MG/DL (ref 8.3–10.1)
CALCIUM SERPL-MCNC: 8.9 MG/DL (ref 8.3–10.1)
CHLORIDE SERPL-SCNC: 104 MMOL/L (ref 100–108)
CO2 SERPL-SCNC: 27 MMOL/L (ref 21–32)
CREAT SERPL-MCNC: 0.91 MG/DL (ref 0.6–1.3)
DME PARACHUTE DELIVERY DATE ACTUAL: NORMAL
DME PARACHUTE DELIVERY DATE EXPECTED: NORMAL
DME PARACHUTE DELIVERY DATE REQUESTED: NORMAL
DME PARACHUTE ITEM DESCRIPTION: NORMAL
DME PARACHUTE ORDER STATUS: NORMAL
DME PARACHUTE SUPPLIER NAME: NORMAL
DME PARACHUTE SUPPLIER PHONE: NORMAL
ERYTHROCYTE [DISTWIDTH] IN BLOOD BY AUTOMATED COUNT: 18.1 % (ref 11.6–15.1)
GFR SERPL CREATININE-BSD FRML MDRD: 81 ML/MIN/1.73SQ M
GLUCOSE SERPL-MCNC: 122 MG/DL (ref 65–140)
HCT VFR BLD AUTO: 44.9 % (ref 36.5–49.3)
HGB BLD-MCNC: 13.5 G/DL (ref 12–17)
IGA SERPL-MCNC: 462 MG/DL (ref 70–400)
IGG SERPL-MCNC: 1270 MG/DL (ref 700–1600)
IGM SERPL-MCNC: 86 MG/DL (ref 40–230)
MCH RBC QN AUTO: 25.5 PG (ref 26.8–34.3)
MCHC RBC AUTO-ENTMCNC: 30.1 G/DL (ref 31.4–37.4)
MCV RBC AUTO: 85 FL (ref 82–98)
PLATELET # BLD AUTO: 353 THOUSANDS/UL (ref 149–390)
PMV BLD AUTO: 10.6 FL (ref 8.9–12.7)
POTASSIUM SERPL-SCNC: 5 MMOL/L (ref 3.5–5.3)
PROT SERPL-MCNC: 7 G/DL (ref 6.4–8.2)
RBC # BLD AUTO: 5.3 MILLION/UL (ref 3.88–5.62)
SODIUM SERPL-SCNC: 136 MMOL/L (ref 136–145)
WBC # BLD AUTO: 13.67 THOUSAND/UL (ref 4.31–10.16)

## 2021-03-16 PROCEDURE — 86038 ANTINUCLEAR ANTIBODIES: CPT | Performed by: STUDENT IN AN ORGANIZED HEALTH CARE EDUCATION/TRAINING PROGRAM

## 2021-03-16 PROCEDURE — 86376 MICROSOMAL ANTIBODY EACH: CPT | Performed by: STUDENT IN AN ORGANIZED HEALTH CARE EDUCATION/TRAINING PROGRAM

## 2021-03-16 PROCEDURE — 86235 NUCLEAR ANTIGEN ANTIBODY: CPT | Performed by: STUDENT IN AN ORGANIZED HEALTH CARE EDUCATION/TRAINING PROGRAM

## 2021-03-16 PROCEDURE — 94761 N-INVAS EAR/PLS OXIMETRY MLT: CPT

## 2021-03-16 PROCEDURE — 82390 ASSAY OF CERULOPLASMIN: CPT | Performed by: STUDENT IN AN ORGANIZED HEALTH CARE EDUCATION/TRAINING PROGRAM

## 2021-03-16 PROCEDURE — 80053 COMPREHEN METABOLIC PANEL: CPT | Performed by: STUDENT IN AN ORGANIZED HEALTH CARE EDUCATION/TRAINING PROGRAM

## 2021-03-16 PROCEDURE — 86039 ANTINUCLEAR ANTIBODIES (ANA): CPT | Performed by: STUDENT IN AN ORGANIZED HEALTH CARE EDUCATION/TRAINING PROGRAM

## 2021-03-16 PROCEDURE — 85027 COMPLETE CBC AUTOMATED: CPT | Performed by: STUDENT IN AN ORGANIZED HEALTH CARE EDUCATION/TRAINING PROGRAM

## 2021-03-16 PROCEDURE — 82103 ALPHA-1-ANTITRYPSIN TOTAL: CPT | Performed by: STUDENT IN AN ORGANIZED HEALTH CARE EDUCATION/TRAINING PROGRAM

## 2021-03-16 PROCEDURE — 85730 THROMBOPLASTIN TIME PARTIAL: CPT | Performed by: INTERNAL MEDICINE

## 2021-03-16 PROCEDURE — 82787 IGG 1 2 3 OR 4 EACH: CPT | Performed by: STUDENT IN AN ORGANIZED HEALTH CARE EDUCATION/TRAINING PROGRAM

## 2021-03-16 PROCEDURE — 86256 FLUORESCENT ANTIBODY TITER: CPT | Performed by: STUDENT IN AN ORGANIZED HEALTH CARE EDUCATION/TRAINING PROGRAM

## 2021-03-16 PROCEDURE — 82784 ASSAY IGA/IGD/IGG/IGM EACH: CPT | Performed by: STUDENT IN AN ORGANIZED HEALTH CARE EDUCATION/TRAINING PROGRAM

## 2021-03-16 RX ORDER — DEXAMETHASONE 6 MG/1
6 TABLET ORAL
Qty: 2 TABLET | Refills: 0 | Status: SHIPPED | OUTPATIENT
Start: 2021-03-16 | End: 2021-03-18

## 2021-03-16 RX ORDER — MULTIVITAMIN/IRON/FOLIC ACID 18MG-0.4MG
1 TABLET ORAL DAILY
Qty: 30 TABLET | Refills: 0 | Status: SHIPPED | OUTPATIENT
Start: 2021-03-17

## 2021-03-16 RX ORDER — DIGOXIN 125 MCG
125 TABLET ORAL DAILY
Qty: 30 TABLET | Refills: 0 | Status: SHIPPED | OUTPATIENT
Start: 2021-03-17

## 2021-03-16 RX ADMIN — RIVAROXABAN 20 MG: 20 TABLET, FILM COATED ORAL at 09:05

## 2021-03-16 RX ADMIN — DOCUSATE SODIUM 100 MG: 100 CAPSULE, LIQUID FILLED ORAL at 09:04

## 2021-03-16 RX ADMIN — FAMOTIDINE 20 MG: 20 TABLET ORAL at 09:03

## 2021-03-16 RX ADMIN — Medication 1 TABLET: at 09:03

## 2021-03-16 RX ADMIN — Medication 2000 UNITS: at 09:03

## 2021-03-16 RX ADMIN — STANDARDIZED SENNA CONCENTRATE 8.6 MG: 8.6 TABLET ORAL at 09:03

## 2021-03-16 RX ADMIN — DIGOXIN 125 MCG: 125 TABLET ORAL at 09:05

## 2021-03-16 NOTE — PROGRESS NOTES
INTERNAL MEDICINE RESIDENCY PROGRESS NOTE     Name: Andreina Salvador   Age & Sex: 66 y o  male   MRN: [id number]  Unit/Bed#: -01   Encounter: [id number]  Team: SOD Team B     PATIENT INFORMATION     Name: Andreina Salvador   Age & Sex: 66 y o  male   MRN: [id number]  Hospital Stay Days: 9    ASSESSMENT/PLAN     Principal Problem:    Pneumonia due to COVID-19 virus  Active Problems:    Atrial fibrillation with RVR ([address] )    Acute respiratory failure with hypoxia ([facility] )    Transaminitis      Transaminitis  Assessment & Plan  Worsening today  Suspect drug-induced as he was recently started on antibiotics as well as Lipitor  Will initiate workup with TRACEY, AMA, anti smooth muscle antibody, IgG levels, ceruloplasmin, alpha 1 antitrypsin  Continue Tylenol low dose prn  Chronic hepatitis panel negative  Continue to trend LFTs, will likely need outpatient follow-up    Acute respiratory failure with hypoxia ([facility] [address] )  Assessment & Plan  Due to COVID pneumonia, possible heart failure  Chest x-ray appeared grossly volume overloaded  Appears euvolemic on exam  BNP however was elevated at 2900, no prior baseline  Echocardiogram largely unremarkable with low normal EF  Continue to wean oxygen, currently on 2L   Discussed with RT, complete home o2 eval  Maintain O2 saturation greater than 88%  Discontinue diuresis due to soft blood pressures  Critical care is aware of patient in case patient decompensates    Atrial fibrillation with RVR (HCC)  Assessment & Plan  Currently stable  - TSH within normal limits  - CTA no evidence of PE  - echo largely unremarkable  - Possibly due to acute COVID infection    Plan:   Stable  - continue Lopressor 25  Q 12 hours scheduled  Withhold parameters  - rate currently controlled between 70s to 80s with periods of tachycardia  - cardiology following, appreciated, continue digoxin given his blood pressure  - continue Xarelto QD  - closely monitor HR and blood pressures      * Pneumonia due to COVID-19 virus  Assessment & Plan  Patient with approximately two weeks of shortness of breath, fevers, chills, myalgias, diarrhea, poor PO intake, fatigue  Tested positive for COVID on 3/1/21 but had been having symptoms before that  Diarrhea has resolved  Per daughter, patient appeared more confused today  Patient satting 88% on room air on arrival to ED with improvement to 96% on 4L nasal cannula  - Labs: AST 77, ALT 79, albumin 2 6, lactic acid 2 5, procalcitonin 1 41, d-dimer 4 58, blood cultures pending, procal 1 41  -D-dimer trending downward, BNP elevated at 2900, no prior baseline  - Chest x-ray in the ED showing diffuse patchy infiltrates  - CT head showing acute pansinusitis, patient asymptomatic  - CTA showed no evidence of pulmonary emboli but with extensive ground-glass infiltrates related to COVID pneumonia and trace pleural effusions  - no smoking or drug history    Plan:   - COVID moderate pathway  - Completed Remedesiver  - continue Decadron, day 6/10  - completed abx tx with ceftriaxone and doxycycline for 5 days  - trend inflammatory markers, procalc now normalized  - blood cultures negative  - chronic hepatitis panel negative  - anticoagulation with xarelto  - wean O2 as tolerated, currently on 2L  - aggressive pulmonary hygeine  - continue to monitor on telemetry  -complete home oxygen eval, will follow up case management to set up home oxygen and potentially discharge patient today  Disposition:  Complete home oxygen eval, if qualifies will set up home oxygen discharge patient  Otherwise medically stable  SUBJECTIVE     Patient seen and examined  No acute events overnight  Patient states his breathing has improved  He denies any chest pain, nausea, vomiting  Tolerating p o  Diet  Rest of ROS was negative  [employer] interpretor was used      OBJECTIVE     Vitals:    03/16/21 0318 03/16/21 0525 03/16/21 0600 03/16/21 0729   BP: 94/59   96/63   BP Location:       Pulse: 72 78 94   Resp: 18   18   Temp:       TempSrc:       SpO2: 94% 98%  95%   Weight:   57 7 kg (127 lb 3 2 oz)    Height:          Temperature:   Temp (24hrs), Av 3 °F (36 3 °C), Min:96 7 °F (35 9 °C), Max:98 °F (36 7 °C)    Temperature: 98 °F (36 7 °C)  Intake & Output:  I/O        07 - 03/15 0700 03/15 07 -  0700  07 -  0700    P  O  360 560     Total Intake(mL/kg) 360 (6 1) 560 (9 7)     Urine (mL/kg/hr) 450 (0 3) 1200 (0 9)     Stool  0     Total Output 450 1200     Net -90 -640            Unmeasured Urine Occurrence  1 x     Unmeasured Stool Occurrence  3 x         Weights:   IBW: 68 4 kg    Body mass index is 19 34 kg/m²  Weight (last 2 days)     Date/Time   Weight    21 0600   57 7 (127 2)    03/15/21 0600   59 5 (131 13)    21 0558   58 4 (128 8)            Physical Exam  Constitutional:       General: He is not in acute distress  Appearance: Normal appearance  He is not ill-appearing  Interventions: Nasal cannula in place  HENT:      Head: Normocephalic and atraumatic  Mouth/Throat:      Mouth: Mucous membranes are moist    Eyes:      Extraocular Movements: Extraocular movements intact  Conjunctiva/sclera: Conjunctivae normal       Pupils: Pupils are equal, round, and reactive to light  Cardiovascular:      Rate and Rhythm: Normal rate  Rhythm irregular  Pulses: Normal pulses  Heart sounds: Normal heart sounds  No murmur  No friction rub  No gallop  Pulmonary:      Effort: Pulmonary effort is normal  No respiratory distress  Breath sounds: Normal breath sounds  No wheezing or rales  Abdominal:      General: Abdomen is flat  Bowel sounds are normal  There is no distension  Palpations: Abdomen is soft  Tenderness: There is no abdominal tenderness  There is no guarding  Musculoskeletal:      Right lower leg: No edema  Left lower leg: No edema  Skin:     General: Skin is warm and dry     Neurological:      General: No focal deficit present  Mental Status: He is alert and oriented to person, place, and time  Psychiatric:         Mood and Affect: Mood normal          Behavior: Behavior normal        LABORATORY DATA     Labs: I have personally reviewed pertinent reports  Results from last 7 days   Lab Units 03/16/21  0529 03/15/21  0503 03/14/21  0440 03/13/21  0453 03/12/21  0512   WBC Thousand/uL 13 67* 13 43* 13 75* 13 27* 11 51*   HEMOGLOBIN g/dL 13 5 13 0 13 3 13 5 12 7   HEMATOCRIT % 44 9 43 2 43 7 44 5 40 7   PLATELETS Thousands/uL 353 327 286 283 257   NEUTROS PCT %  --   --   --   --  87*   MONOS PCT %  --   --   --   --  3*   MONO PCT %  --   --   --  3*  --       Results from last 7 days   Lab Units 03/16/21  0529 03/15/21  0503 03/14/21  0440 03/13/21  0454   POTASSIUM mmol/L 5 0 4 8 4 8 4 3   CHLORIDE mmol/L 104 106 109* 104   CO2 mmol/L 27 26 28 26   BUN mg/dL 30* 34* 36* 34*   CREATININE mg/dL 0 91 0 83 0 85 0 81   CALCIUM mg/dL 8 9 8 5 8 3 8 3   ALK PHOS U/L 126*  --  110 107   ALT U/L 333*  --  255* 277*   AST U/L 119*  --  75* 93*     Results from last 7 days   Lab Units 03/15/21  0503   MAGNESIUM mg/dL 2 4     Results from last 7 days   Lab Units 03/15/21  0503   PHOSPHORUS mg/dL 3 5      Results from last 7 days   Lab Units 03/09/21  1052   PTT seconds 79*               IMAGING & DIAGNOSTIC TESTING     Radiology Results: I have personally reviewed pertinent reports  Xr Chest Portable    Result Date: 3/10/2021  Impression: Worsening of bilateral pulmonary infiltrates Workstation performed: [id number]     Xr Chest 1 View Portable    Result Date: 3/8/2021  Impression: Diffuse patchy groundglass opacity throughout the lung fields  No effusions or pneumothorax  In the setting of clinically suspected/proven COVID-19, this plain film appearance while nonspecific, can be seen in cases of viral pneumonia such as COVID-19   Workstation performed: [id number]     Ct Head Without Contrast    Result Date: 3/7/2021  Impression: Acute pansinusitis  No sign of an acute transcortical infarction  No acute intracranial hemorrhage  Workstation performed: [id number]     Cta Ed Chest Pe Study    Result Date: 3/7/2021  Impression: No evidence of pulmonary emboli  Extensive groundglass infiltrates related to Covid pneumonia  Trace pleural effusions  Mild mediastinal and hilar adenopathy Workstation performed: [id number]     Other Diagnostic Testing: I have personally reviewed pertinent reports  ACTIVE MEDICATIONS     Current Facility-Administered Medications   Medication Dose Route Frequency    acetaminophen (TYLENOL) tablet 650 mg  650 mg Oral Q6H PRN    cholecalciferol (VITAMIN D3) tablet 2,000 Units  2,000 Units Oral Daily    dexamethasone (DECADRON) injection 6 mg  6 mg Intravenous Q24H    digoxin (LANOXIN) tablet 125 mcg  125 mcg Oral Daily    docusate sodium (COLACE) capsule 100 mg  100 mg Oral BID    famotidine (PEPCID) tablet 20 mg  20 mg Oral BID    melatonin tablet 3 mg  3 mg Oral HS    metoprolol tartrate (LOPRESSOR) tablet 25 mg  25 mg Oral Q12H [facility]    multivitamin-minerals (CENTRUM ADULTS) tablet 1 tablet  1 tablet Oral Daily    ondansetron (ZOFRAN) injection 4 mg  4 mg Intravenous Q6H PRN    rivaroxaban (XARELTO) tablet 20 mg  20 mg Oral Daily With Breakfast    senna (SENOKOT) tablet 8 6 mg  1 tablet Oral Daily       VTE Pharmacologic Prophylaxis: Reason for no pharmacologic prophylaxis Xarelto  VTE Mechanical Prophylaxis: sequential compression device    Portions of the record may have been created with voice recognition software  Occasional wrong word or "sound a like" substitutions may have occurred due to the inherent limitations of voice recognition software    Read the chart carefully and recognize, using context, where substitutions have occurred   ==  Daren Mario, [facility]  Internal Medicine Residency PGY-2

## 2021-03-16 NOTE — ASSESSMENT & PLAN NOTE
Patient with approximately two weeks of shortness of breath, fevers, chills, myalgias, diarrhea, poor PO intake, fatigue  Tested positive for COVID on 3/1/21 but had been having symptoms before that  Diarrhea has resolved  Per daughter, patient appeared more confused today  Patient satting 88% on room air on arrival to ED with improvement to 96% on 4L nasal cannula  - Labs: AST 77, ALT 79, albumin 2 6, lactic acid 2 5, procalcitonin 1 41, d-dimer 4 58, blood cultures pending, procal 1 41  -D-dimer trending downward, BNP elevated at 2900, no prior baseline  - Chest x-ray in the ED showing diffuse patchy infiltrates  - CT head showing acute pansinusitis, patient asymptomatic  - CTA showed no evidence of pulmonary emboli but with extensive ground-glass infiltrates related to COVID pneumonia and trace pleural effusions  - no smoking or drug history    Plan:   - COVID moderate pathway  - Completed Remedesiver  - continue Decadron, day 6/10  - completed abx tx with ceftriaxone and doxycycline for 5 days  - trend inflammatory markers, procalc now normalized  - blood cultures negative  - chronic hepatitis panel negative  - anticoagulation with xarelto  - wean O2 as tolerated, currently on 2L  - aggressive pulmonary hygeine  - continue to monitor on telemetry  -complete home oxygen eval, will follow up case management to set up home oxygen and potentially discharge patient today

## 2021-03-16 NOTE — RESPIRATORY THERAPY NOTE
Home Oxygen Qualifying Test       Patient name: Mireya Sparks        : 1945   Date of Test:  2021  Diagnosis:      Home Oxygen Test:    **Medicare Guidelines require item(s) 1-5 on all ambulatory patients or 1 and 2 on non-ambulatory patients  1   Baseline SPO2 on Room Air at rest 90 %  2   SPO2 during exercise on Room Air 83 %  During exercise monitor SpO2  If SPO2 increases >=89% with ambulation do not add supplemental             oxygen  If <= 88% on room air add O2 via NC and titrate patient  Patient must be ambulated with O2 and titrated to > 88% with exertion  3   SPO2 on Oxygen at rest 90 % 3 lpm     4   SPO2 during exercise on Oxygen  92% a liter flow of 4 lpm     5   Exercise performed:          walking, duration 6 (min)          [x]  Supplemental Home Oxygen is indicated  []  Client does not qualify for home oxygen  Respiratory Additional Notes- Pt required 4L O2 to maintain saturations of 92%  Pt was very SOB during ambulation     Debi Friday, RT

## 2021-03-16 NOTE — DISCHARGE INSTRUCTIONS
Establish care with a primary care physician in 1-2 weeks  Get follow-up blood work in 1 week  Use oxygen 3-4 L as needed  Taking COVID precautions for at least 10 days post symptoms which will be in 2 days  Please were mask when interacting with patient an attempt to remain isolated until 10 day  After recovery in a few weeks you can request to have COVID vaccination as you are high risk  Continue medications as prescribed including her blood thinner, heart medications, and steroids  Follow-up with cardiology in 1 month  COVID-19 (Enfermedad por coronavirus 2019)   LO QUE NECESITA SABER:   COVID-19 es la enfermedad causada por el nuevo coronavirus descubierto por primera vez en diciembre de 2019  Los coronavirus generalmente causan infecciones de las vías respiratorias superiores (nariz, garganta y pulmones), amanda un resfriado  El nuevo virus también puede causar afecciones respiratorias inferiores graves, amanda la neumonía o el síndrome de dificultad respiratoria aguda (SDRA)  Cualquier persona puede desarrollar problemas graves a causa del nuevo virus, dorothy el riesgo es mayor si tiene 72 años o [place]  Un sistema inmunitario débil, la diabetes o juanis enfermedad cardíaca o pulmonar también pueden aumentar el riesgo  INSTRUCCIONES SOBRE EL HARIS HOSPITALARIA:   Si wilber que usted o alguien que conoce puede estar infectado: [place] lo siguiente para proteger a otras personas:  · Si se requiere atención de emergencia, avise al operador de la posible infección, o llame antes y avise al servicio de urgencias  · Llame a un médico para recibir instrucciones si los síntomas son leves  Cualquier persona que pueda estar infectada no  debe llegar sin llamar raffy  El médico deberá proteger a los miembros del personal y a otros pacientes  · La persona que puede estar infectada debe usar un tapabocas mientras reciben [facility]  [place] ayudará a reducir el riesgo de infectar a otras personas   Nadie que sea mirian de 2 años, que tenga problemas respiratorios o que no pueda quitárselo debe usar un tapabocas  El médico puede darle instrucciones para cualquier persona que no pueda usar un tapabocas  Llame al [facility] de emergencias (911 en los [place]) o pídale a alguien que lo lleve al departamento de emergencias si:  · Usted tiene dificultad para respirar o falta de aliento mientras descansa  · Usted siente presión o dolor en el pecho que dura más de 5 minutos  · Usted tiene confusión o es difícil despertarlo  · Bharati labios o dleonte están azules  · Usted tiene fiebre de 104 ºF (40 °C) o más  Llame a rocha médico si:  · No  tiene síntomas de COVID-19 dorothy tuvo contacto físico cercano dentro de los 14 días con alguien que jade positivo  · Usted tiene preguntas o inquietudes acerca de rocha condición o cuidado  Medicamentos: Es posible que necesite alguno de los siguientes si los síntomas son leves:  · Los descongestionantes ayudan a reducir la congestión nasal y [facility] a respirar más fácilmente  Si aundrea pastillas descongestionantes, pueden causarle agitación o problemas para dormir  No use aerosol descongestionante por más de unos cuantos días  · Los jarabes para la tos ayudan a reducir la tos  Pregúntele a rocha médico cuál tipo de medicamento para la tos es mejor para usted  · Acetaminofén boyd el dolor y baja la fiebre  Está disponible sin receta médica  Pregunte la cantidad y la frecuencia con que debe tomarlos  [address]  [place] las etiquetas de todos los demás medicamentos que esté usando para saber si también contienen acetaminofén, o pregunte a rocha médico o farmacéutico  El acetaminofén puede causar daño en el hígado cuando no se aundrea de forma correcta  No use más de 4 gramos (4000 miligramos) en total de acetaminofeno en un día  · [place], amanda el ibuprofeno, Arabic [facility] a disminuir la inflamación, el dolor y la [place]   Los ADRIENNE pueden causar sangrado estomacal o problemas renales en ciertas personas  Si usted aundrea un medicamento anticoagulante, siempre pregúntele a rocha médico si los ADRIENNE son seguros para usted  Siempre paola la etiqueta de janee medicamento y [place] instrucciones  · [place] jayne medicamentos amanda se le haya indicado  Consulte con rocha médico si usted wilber que rocha medicamento no le está ayudando o si presenta efectos secundarios  Infórmele si es alérgico a cualquier medicamento  Mantenga juanis lista actualizada de los [place], las vitaminas y los productos herbales que aundrea  Incluya los siguientes datos de los medicamentos: cantidad, frecuencia y motivo de administración  Traiga con usted la lista o los envases de las píldoras a jayne citas de seguimiento  Lleve la lista de los medicamentos con usted en gonzalo de juanis emergencia  Cómo se propaga el coronavirus 2019: El virus se propaga rápida y fácilmente  Puede infectarse si está en contacto con juanis gran cantidad del virus, incluso rylee poco tiempo  También puede infectarse por estar cerca de juanis pequeña cantidad del virus rylee mucho tiempo  A continuación se indican las formas en que se wilber que se propaga el virus, dorothy es posible que surja más información:  · Las gotitas son la forma más común de propagación de todos los coronavirus  El virus puede viajar en gotitas que se jw cuando juanis persona habla, tose o estornuda  Cualquiera que respire las gotitas o que las gotitas se le metan en los ojos puede infectarse con el virus  Se wilber que el contacto personal cercano con juanis persona infectada es la principal forma de propagación del virus  El contacto personal cercano significa estar a menos de 6 pies (2 metros) de otra persona  · El contacto de persona a persona puede propagar el virus  Por ejemplo, juanis persona con el virus en jayne mickie puede propagarlo al darle la mano a alguien  En janee momento, no parece que el virus pueda transmitirse a un bebé rylee el embarazo o [address]   El bebé puede infectarse después de nacer por contacto de persona a persona  El virus tampoco parece propagarse por la [place]  Si está embarazada o amamantando, hable con rocha médico u obstetra sobre cualquier preocupación que tenga  · El virus puede permanecer en objetos y superficies  Juanis persona puede contraer el virus en jayne mickie al tocar el objeto o la superficie  La infección se produce si la persona se toca los ojos o la boca sin antes lavarse las mickie  Aún no se sabe cuánto tiempo puede permanecer el virus en un objeto o superficie  Por eso es importante limpiar todas las superficies que se usan regularmente  · Un animal infectado puede ser [place] de infectar a juanis persona que lo toque  [place] puede ocurrir en [facility] vivos o en juanis otis  Cómo todo el topher puede reducir el riesgo de COVID-19: La mejor manera de prevenir la infección es evitar a cualquiera que esté infectado, dorothy esto puede ser difícil de lograr  Juanis persona infectada puede propagar el virus antes de que aparezcan los signos o síntomas, o incluso si los signos o síntomas nunca se desarrollan  Lo siguiente puede ayudar a reducir el riesgo de infección:      · [employer] las mickie con frecuencia rylee el día  Utilice agua y [place]  Frótese las mickie enjabonadas, [employer] dedos  Lávese el frente y el dorso de cada [place], y Timothy dedos  Use los dedos de juanis mano para restregar debajo de las uñas de la [place]  Lávese rylee al menos 20 segundos  Enjuague con agua corriente caliente rylee varios segundos  Luego séquese las mickie con juanis toalla limpia o juanis toalla de papel  Puede usar un desinfectante para mickie que contenga alcohol, si no hay agua y jabón disponibles  No se toque los ojos, la nariz o la boca sin antes [employer]  Enseñe a los niños a lavarse las mickie y a usar el desinfectante de [address]  · Cúbrase al toser o estornudar  [place] giles que las gotitas viajen de usted a los demás   Gire la delonte y cúbrase la boca y la [place] con un pañuelo  Deseche el pañuelo  Use el ángulo del brazo si no tiene un pañuelo disponible  Luego lávese las mickie con agua y [place] o use un desinfectante de mickie  Gire la chikis y cúbrase si está cerca de alguien que está estornudando o tosiendo  Enséñeles a los niños a cubrirse al toser o estornudar  · [address] distanciamiento social a nivel local, nacional y [place]  El distanciamiento social significa que las personas evitan el contacto físico cercano para que el virus no se propague de Eligha Gunter persona a otra  Mantenga al menos 6 pies (2 metros) de distancia entre usted y los demás  También mantenga esta distancia de cualquiera que venga a rocha casa, amanda alguien que alexandria juanis entrega  · Acostúmbrese a no tocarse la delonte  No se sabe cuánto tiempo puede permanecer el virus en los objetos y las superficies  Si tiene el virus [employer], puede transferirlo a los ojos, la nariz o la boca e infectarse  También puede transferirlo a los objetos, las [address] [facility] [address] [facility] [address]  Tenga cuidado con lo que toca [employer]  Por ejemplo, los pasamanos y botones de ascensor  Intente no tocar nada con las mickie descubiertas a menos que sea necesario  [employer] las mickie antes de salir de rocha casa y cuando regresa  · Limpie y desinfecte a menudo los objetos y las superficies de alto contacto  Use juanis solución o toallitas desinfectantes  Puede hacer juanis solución diluyendo 4 cucharaditas de lejía en 1 cuarto de galón (4 tazas) de agua  Limpie y desinfecte aunque piense que nadie que viva o haya entrado en rocah casa esté infectado con el virus  Puede limpiar los objetos con un paño desinfectante antes de llevarlos a rocha casa  [employer] las mickie después de manipular cualquier cosa que traiga a rocha casa  · [place] que rocha sistema inmunitario esté lo más saludable posible  Un sistema inmunitario debilitado lo hace más vulnerable al nuevo coronavirus  No hay ninguna vacuna contra la COVID-19 disponible todavía   Las [place], amanda la vacuna contra la gripe y la neumonía, pueden ayudar al sistema inmunitario  Rocha médico le indicará qué vacunas debe recibir y cuándo aplicárselas  Mantenga rocha sistema inmunitario lo más gael posible  No fume  Consuma alimentos saludables, ry ejercicio regularmente e intente controlar el estrés  Acuéstese y levántese a la misma hora todos los días  Pautas de distanciamiento social: Las normas de distanciamiento social nacionales y locales varían  Las reglas pueden cambiar con el tiempo a medida que se levantan las restricciones  Las restricciones pueden volver a aplicarse si se produce un brote en el lugar donde usted vive  Es importante conocer y seguir todas las reglas de distanciamiento social actuales en rocha Kaita Justice  Las siguientes son reglas generales al respecto:  · Limite los viajes fuera de rocha casa  Es posible que se le entreguen alimentos, medicinas y otros suministros  Si es posible, ry que dejen los [employer] entregan en rocha cal o en [place]  Intente que nadie le entregue un [employer]  Estará tan cerca de la persona que el virus puede propagarse entre ustedes  · No tenga contacto físico cercano con nadie que no viva en rocha casa  No le dé la mano, abrace o bese a juanis persona amanda saludo  Párese o camine lo más lejos posible de los demás  Si tiene que usar el transporte público ([address]), intente sentarse o pararse lejos de los demás  Puede mantenerse conectado de forma morocho con los demás a través de llamadas telefónicas, mensajes de correo electrónico, sitios web de [employer] y videochats  Verifique cómo están las personas que pueden tener dificultades para distanciarse socialmente, o que viven solas  Pregunte a los administradores de los asilos de ancianos o de las instalaciones de cuidados a artie plazo cómo puede comunicarse con seguridad con alguien que vive allí  · Use un tapabocas de cal cuando esté cerca de otras personas que no viven en rocha casa  Los tapabocas ayudan evitar que el virus se propague a otras personas en las gotitas  Puede usar un tapabocas transparente si alguien necesita leer jayne labios  [place] es un tapabocas con un plástico sobre el área de la boca para que se puedan tam los labios  No utilice tapabocas que tengan válvulas de respiración o respiraderos  El virus puede salir por la válvula o el respiradero y contagiar a otros  No se quite el tapabocas para hablar, toser o estornudar  No utilice tapabocas en niños menores de 2 años ni en personas que tengan problemas respiratorios o no puedan quitárselo  · Permita que solo los profesionales médicos u otros profesionales ingresen a rocha casa  Use el tapabocas y recuérdeles a los profesionales que usen un tapabocas  Recuérdeles que se laven las mickie cuando lleguen y antes de irse  No  deje entrar a nadie que no viva en rocha casa, aunque no esté enfermo  Jimena persona puede contagiar el virus a otros antes de que comiencen los síntomas de COVID-19  Algunas personas ni siquiera desarrollan síntomas  Los niños suelen tener síntomas leves o ningún síntoma  Puede ser difícil decirle a un janet que no lo abrace ni lo bese  Explíquele que así es amanda puede ayudarlo a mantenerse saludable  · No vaya a la casa de [place] persona, a menos que sea necesario  No vaya de visita, aunque la persona esté cy  Vaya solo si necesita ayudarla  Asegúrese de que ambos usen un tapabocas mientras esté allí  · Evite las grandes reuniones y las multitudes  Las reuniones o multitudes de 10 o más individuos pueden hacer que el virus se propague  Por ejemplo, las reuniones incluyen fiestas, eventos deportivos, servicios religiosos y conferencias  Se pueden formar multitudes en [address], los parques y las atracciones turísticas  Protéjase manteniéndose alejado de las grandes reuniones y multitudes  · Pregunte a rocha médico de qué otra forma [address] citas   Es posible que pueda tener citas sin tener que ir al consultorio del médico  Algunos médicos ofrecen citas por teléfono, video u otros tipos de citas  También puede obtener recetas de jayne medicamentos para varios meses de juanis vez  · Manténgase a hal si debe que salir a trabajar  Es posible que tenga un trabajo que solo se puede hacer fuera de rocha casa  Mantenga la distancia física entre usted y los demás trabajadores tanto amanda sea posible  Siga las reglas de rocha empleador para que todos estén a hal  Si tiene COVID-19 y se está recuperando en casa: Los [employer] darán instrucciones específicas que debe seguir  Las siguientes son pautas generales para recordarle cómo mantener a los demás a hal hasta que usted esté júnior:  · Lávese las mickie frecuentemente  Use agua y jabón tanto amanda sea posible  Puede usar un desinfectante para mickie que contenga alcohol, si no hay agua y jabón disponibles  No comparta toallas con nadie  Si [place] toallas de papel, deséchelas en un cubo de basura recubierto que se guarda en rocha habitación o área  Use un cubo de basura cubierto, si es posible  · No salga de rocha casa a menos que sea necesario  Es posible que tenga que ir al [employer] de rocha médico para hacerse chequeos o para resurtir juanis Virgie Leopoldo  No llegue al consultorio del médico sin juanis joby  Los médicos tienen que hacer que jayne consultorios madelyn seguros para el personal y [de-identified] pacientes  · No entre en contacto físico cercano con nadie, hal que sea necesario  Solo tenga un contacto físico cercano con juanis persona que lo cuide directamente, o con un bebé o janet que deba cuidar  Los miembros de la isabel y los amigos no deben visitarlo  Si es posible, quédese en un área o habitación separada de rocha casa si vive con [employer]  Nadie debe entrar en el área o en la habitación excepto para brindarle cuidados  Puede visitar a los demás por teléfono, videochat, correo electrónico o sistemas similares   Es importante mantenerse conectado con los demás en rocha anatoly [place] se recupera  · Use un tapabocas mientras haya otras personas cerca de usted  [place] puede ayudar a [employer] propaguen el virus cuando usted [address] [place], estornuda o tose  Póngase el tapabocas antes de que la persona entre en rocha habitación o [place]  Recuérdele a la persona que se cubra la nariz y la boca antes de entrar a brindarle cuidados  · No comparta artículos  No comparta platos, toallas ni otros artículos con nadie  Los artículos deben ser lavados después de usarlos  · Larina Sudha a rocha bebé  Lávese las mickie con agua y jabón con frecuencia rylee todo el día  Use un tapabocas mientras amamanta o mientras se extrae o se saca la [address]  Si es posible, pídale a alguien que esté júnior que cuide de rocha bebé  Puede poner la [address] en biberones para que la persona la use, si es necesario  Hable con rocha médico si tiene preguntas o inquietudes acerca de cómo cuidar o vincularse con rocha bebé  También le dirá cuándo debe traer a rocha bebé para los chequeos y las vacunas  También le dirá qué hacer si wilber que rocha bebé está infectado con el nuevo virus  · No manipule animales vivos  Hasta que se sepa más, es mejor no tocar, jugar o manipular animales vivos  Algunos animales, incluyendo las [place], gonzales sido infectados con el nuevo coronavirus  No manipule ni cuide animales hasta que esté júnior  El cuidado incluye alimentar, acariciar y Dorothea Sake a rocha mascota  No deje que rocha mascota lo lama o comparta rocha comida  Pídale a alguien que no esté infectado que cuide de [address], si es posible  Si debe cuidar a [employer], [place] un tapabocas  Lávese las mickie antes y después de cuidar a rocha mascota  · Siga las instrucciones de rocha médico para estar cerca de los demás después de recuperarse  Deberá esperar al menos 10 días después de la aparición de los síntomas  Entonces deberá pasar 24 horas sin fiebre sin recibir medicamentos para la fiebre, y sin otros síntomas   La pérdida del sentido del gusto o el olfato puede continuar rylee varios meses  Se considera que está júnior estar cerca de otros si janee es el único síntoma  No se sabe con certeza si juanis persona recuperada puede transmitir el virus a otros, ni por cuánto tiempo  Rocha médico puede darle instrucciones, amanda continuar con el distanciamiento social o usar un tapabocas cuando esté cerca de otras personas  Cómo cuidar de alguien que tiene COVID-19: Si la persona vive en otro hogar, coordine un tiempo para brindar cuidados  Recuerde llevar algunos pares de guantes desechables y un tapabocas  [place] son las pautas generales para ayudarle a cuidar de forma morocho a cualquier persona que tenga COVID-19:  · Lávese las mickie frecuentemente  Lávese antes y después de entrar en la casa, área o habitación de la persona  [address] papel en un cubo de basura recubierto que tenga juanis tapa, si es posible  · No permita que otros se acerquen a la persona  Nadie debe ingresar a la casa de la persona a menos que sea necesario  De ser posible, la persona debe estar en un área o habitación separada si vive con [employer]  Mantenga la cal de la habitación cerrada a menos que necesite entrar o salir  [place] que otras personas llamen, charlen por video o envíen un correo electrónico a la persona si se siente lo suficientemente júnior  La persona puede sentirse cy si se la mantiene separada rylee un artie período de [place]  La comunicación morocho puede ayudar a esta persona a mantenerse en contacto con rocha isabel y amigos  · Asegúrese de que la habitación de la persona tenga un buen flujo de [place]  Puede abrir la ventana si el clima lo permite  También se puede encender el aire acondicionado para ayudar a que el aire se [place]  · Comuníquese con la persona antes de entrar para brindarle cuidados  Asegúrese de que la persona use un tapabocas  Recuérdele que se lave las mickie con agua y [place]   Puede usar un desinfectante para mickie que [employer] alcohol, si no hay agua y jabón disponibles  Colóquese el tapabocas antes de entrar al lugar a brindar cuidados  · Use guantes mientras rose cuidados y limpia  Limpie los [employer] persona [place] a menudo  Limpie las encimeras, las superficies de cocción y los frentes y el interior del microondas y el refrigerador  Limpie la ducha, el sanitario, el área alrededor del sanitario, el lavabo, el área alrededor del lavabo y los grifos  Junte la ropa sucia o la ropa de Lia Cesar y seque los artículos con el agua más caliente que permita la cal  Lave los platos y utensilios usados en Circle y nunavut o en un lavavajillas  · Todo lo que deseche debe ir a un cubo de basura recubierto  Cuando necesite vaciar la basura, cierre el extremo abierto de la [place] y [place]  [place] ayuda a evitar que los artículos en los que está el virus se salgan de la basura  Quítese los guantes y deséchelos  Lávese las [place]  Acuda a la consulta de control con rocha médico según las indicaciones: Anote jayne preguntas para que se acuerde de hacerlas rylee jayne visitas  Para más información:  · Centers for Disease Control and Prevention  [address] , [address]  Phone: [phone]  Web Address: House [employer] com Med[employer]    [facility] [place] [address] Information is for End User's use only and may not be sold, redistributed or otherwise used for commercial purposes  All illustrations and images included in CareNotes® are the copyrighted property of A D A M , Inc  or [address] [place] [address] es sólo para uso en educación  Rocha intención no es darle un consejo médico sobre enfermedades o tratamientos  Colsulte con rocha Ebony Juan farmacéutico antes de seguir cualquier régimen médico para saber si es seguro y efectivo para usted  Fibrilación auricular (Fib A)   LO QUE NECESITA SABER:   La Fib A podría ser intermitente o podría ser juanis condición de Ferna Noemi   La Fib A puede causar coágulos de nathaniel, accidente cerebrovascular o insuficiencia cardíaca  Estas condiciones pueden llegar a ser letales  Es importante tratar y controlar la Fib A para ayudar a evitar un coágulo de nathaniel, un accidente cerebrovascular o la insuficiencia cardíaca  INSTRUCCIONES SOBRE EL HARIS HOSPITALARIA:   Llame al número de emergencias local (911 en los [place]) si:  · Tiene alguno de los siguientes signos de un ataque cardíaco:      ? Estrujamiento, presión o tensión en rocha pecho    ? Usted también podría presentar alguno de los siguientes:     ? Malestar o dolor en rocha espalda, anuja, mandíbula, abdomen, o Fred Kand    ? Falta de aliento    ? Náuseas o vómitos    ? Desvanecimiento o sudor frío repentino    · Usted tiene alguno de los siguientes signos de derrame cerebral:      ? Adormecimiento o caída de un lado de rocha delonte    ? Debilidad en un Fred Kand o juanis pierna    ? Confusión o debilidad para hablar    ? Mareos o dolor de chikis intenso, o pérdida de la visión  Llame a rocha cardiólogo si:  · Rocha brazo o pierna se siente caliente, sensible y adolorida  Se podría tam inflamado y brooks  · Rocha corazón late a más de 110 latidos por minuto  · Usted tiene nueva inflamación en jayne piernas, pies, tobillos o abdomen o esta ha empeorado  · Le falta el aire, incluso cuando está en reposo  · Usted tiene preguntas o inquietudes acerca de rocha condición o cuidado  Medicamentos: Es posible que usted necesite alguno de los siguientes:  · Los medicamentos para el corazón ayudan a controlar la frecuencia y el ritmo cardíaco  Es posible que necesite más de un medicamento para tratar jayne síntomas  · Los anticoagulantes ayudan a evitar los coágulos sanguíneos  Los coágulos pueden ocasionar derrames cerebrales, ataques al corazón y [facility] Sophia son pautas generales de seguridad para seguir mientras está tomando un anticoagulante:    ? Esté atento a sangrados y hematomas mientras esté tomando anticoagulantes  Esté atento a cualquier sangrado de las encías o nariz  Esté atento a la aparición de nathaniel en rocha orina y evacuaciones intestinales  Use juanis toalla suave para rocha piel y un cepillo de dientes de cerdas suaves para cepillarse jayne dientes  [place] puede evitar que rocha piel o encías sangren  Si usted se afeita, use juanis rasuradora eléctrica  No practique deportes de contacto  ? Informe a rocha odontólogo y otros médicos que usted aundrea anticoagulantes  Lleve un brazalete o un collar que indique que usted aundrea FilesX  ? No empiece ni suspenda ningún medicamento, a menos que rocha médico se lo indique  Muchos medicamentos no se pueden usar junto con los anticoagulantes  ? [place] el anticoagulante exactamente amanda se lo ordenó rocha médico  No omita ninguna dosis ni tome menos de lo indicado  Informe a rocha médico inmediatamente si usted olvida vonda el anticoagulante o si aundrea de más  ? La warfarina  es un anticoagulante que usted puede necesitar vonda  Usted debería saber lo siguiente en gonzalo de que tome warfarina:     § Algunos alimentos y medicamentos pueden afectar la cantidad de warfarina en rocha nathaniel  No realice cambios mayores en rocha alimentación mientras aundrea warfarina  La warfarina funciona mejor si usted ingiere aproximadamente la misma cantidad de vitamina K todos los días  La vitamina K se encuentra en las hortalizas de hoja carmina y algunos otros alimentos  [place] más información acerca de lo que tiene que comer cuando usted aundrea warfarina  § Usted necesitará acudir a consultas de control con rocha médico cuando esté tomando warfarina  Deberá hacerse análisis de nathaniel periódicamente  Estos análisis se usan para determinar la cantidad de [employer] necesita  · Los medicamentos antiplaquetarios , amanda la aspirina, Citizen of [place] [facility] a prevenir coágulos de [place]  [place] jayne antiplaquetarios exactamente amanda se le haya indicado   Estos medicamentos podrían causar mas probabilidad para sangrado y para desarrollar moretones  Si le gonzales indicado el uso de aspirina, no tome acetaminofén o ibuprofeno en rocha lugar  · [place] jayne medicamentos amanda se le haya indicado  Consulte con rocha médico si usted wilber que rocha medicamento no le está ayudando o si presenta efectos secundarios  Infórmele si es alérgico a cualquier medicamento  Mantenga juanis lista actualizada de los [place], las vitaminas y los productos herbales que aundrea  Incluya los siguientes datos de los medicamentos: cantidad, frecuencia y motivo de administración  Traiga con usted la lista o los envases de las píldoras a jayne citas de seguimiento  Lleve la lista de los medicamentos con usted en gonzalo de juanis emergencia  Controle la fibrilación auricular:  · Conozca rocha ritmo cardíaco ideal  Aprenda cómo controlarse el pulso y monitorear rocha ritmo cardíaco     · Conozca los riesgos si decide beber alcohol  El alcohol puede empeorar el control de la fibrilación auricular  Pregunte a rocha médico si usted puede vonda alcohol  Un trago equivale a 12 onzas de cerveza, 5 onzas de vino o 1 onza y ½ de licor  · No fume  La nicotina puede causar daño cardíaco y dificultar el control de la fibrilación auricular  No use cigarrillos electrónicos o tabaco sin humo en vez de cigarrillos o para tratar de dejar de fumar  Todos estos aún contienen nicotina  Pida a rocha médico información si usted fuma actualmente y [place] para dejar de hacerlo  · Consuma alimentos saludables para el corazón  Los alimentos saludables para el corazón lo ayudarán a mantener rocha colesterol bajo  Las frutas, verduras, panes integrales, productos lácteos bajos en grasa, frijoles, jeanette magras y pescado son [place] saludables para el corazón  Reemplace la mantequilla y la margarina con aceites saludables para el corazón amanda el aceite de medeiros y el aceite de [place]  · Mantenga un peso saludable  Consulte con rocha médico cuánto debería pesar   [place] que lo ayude a crear un plan seguro para bajar de peso si tiene sobrepeso  Incluso un pequeño objetivo de juanis pérdida de peso del 10% puede mejorar rocha cielo cardíaca  · Realice actividad física regularmente  La actividad física ayuda a mejorar la cielo del ismaelazón  [place] al menos 150 minutos de [place] física Edson moderada cada [place]  Rocha médico puede ayudarle a crear un plan de actividades  · Controle otras afecciones de [place]  [place] incluye la presión arterial jyoti o el colesterol, la apnea del sueño, la diabetes y otras enfermedades cardíacas  [place] los [employer] se le haya indicado y siga rocha plan de [place]  Programe juanis joby con rocha cardiólogo según le indiquen: Usted deberá hacerse análisis de nathaniel y someterse a observaciones periódicamente  Anote jayne preguntas para que se acuerde de hacerlas rylee jayne visitas  © Copyright [place] [employer] Information is for End User's use only and may not be sold, redistributed or otherwise used for commercial purposes  All illustrations and images included in CareNotes® are the copyrighted property of A D A M , [employer]  or [address] [place] [address] es sólo para uso en educación  Rocha intención no es darle un consejo médico sobre enfermedades o tratamientos  Colsulte con rocha Rickford Bloomer farmacéutico antes de seguir cualquier régimen médico para saber si es seguro y efectivo para usted

## 2021-03-16 NOTE — ASSESSMENT & PLAN NOTE
Currently stable  - TSH within normal limits  - CTA no evidence of PE  - echo largely unremarkable  - Possibly due to acute COVID infection    Plan:   Stable  - continue Lopressor 25  Q 12 hours scheduled  Withhold parameters  - rate currently controlled between 70s to 80s with periods of tachycardia  - cardiology following, appreciated, continue digoxin given his blood pressure  - continue Xarelto QD  - closely monitor HR and blood pressures

## 2021-03-16 NOTE — ASSESSMENT & PLAN NOTE
Worsening today  Suspect drug-induced as he was recently started on antibiotics as well as Lipitor  Will initiate workup with TRACEY, AMA, anti smooth muscle antibody, IgG levels, ceruloplasmin, alpha 1 antitrypsin    Continue Tylenol low dose prn  Chronic hepatitis panel negative  Continue to trend LFTs, will likely need outpatient follow-up

## 2021-03-16 NOTE — ASSESSMENT & PLAN NOTE
Due to COVID pneumonia, possible heart failure  Chest x-ray appeared grossly volume overloaded  Appears euvolemic on exam  BNP however was elevated at 2900, no prior baseline  Echocardiogram largely unremarkable with low normal EF  Continue to wean oxygen, currently on 2L   Discussed with RT, complete home o2 eval  Maintain O2 saturation greater than 88%  Discontinue diuresis due to soft blood pressures    Critical care is aware of patient in case patient decompensates

## 2021-03-16 NOTE — DISCHARGE SUMMARY
INTERNAL MEDICINE RESIDENCY DISCHARGE SUMMARY     Kerrie Forte   66 y o  male  MRN: [id number]  Room/Bed: /-01     [facility]   Encounter: [id number]    Principal Problem:    Pneumonia due to COVID-19 virus  Active Problems:    Atrial fibrillation with RVR ([facility] [address] )    Acute respiratory failure with hypoxia ([facility] [address] )    Transaminitis      For further details on plan please refer to progress note on 3/16/2021  [address] COURSE     56-year-old male with no significant past medical history was admitted to [facility] with acute pneumonia due to COVID-19 and new onset atrial fibrillation with RVR  Patient was placed on oxygen initially on mid flow and was weaned down to nasal cannula oxygen at 4 L  He completed a course of remdesivir, ceftriaxone doxycycline  He will acquire home oxygen with ambulation and at rest   Cardiology evaluated patient for atrial fibrillation  He was started on metoprolol and digoxin  He is currently rate controlled  He was anticoagulated with heparin which was transitioned to Xarelto which is affordable for patient  We will be discharging patient on all 3 of these medications  Of note he had a transaminitis which worsened during his hospital stay  Initially thought to be drug-induced liver injury however will initiate workup for secondary causes of transaminitis  Lab work is currently pending for review with his primary care physician  Information to set up and establish care with primary care physician as well as follow-up for cardiology has been made for patient  He will obtain follow-up blood work in 1 week  On day of discharge he denies any chest pain or shortness of breath  He is eager to go home  Medically stable for discharge with close follow-up      DISCHARGE INFORMATION     PCP at Discharge:  None, will establish care    Admitting Provider: Yasmine Torres MD  Admission Date: 3/7/2021    Discharge Provider: Sirisha Guillen MD  Discharge Date: 3/16/2021    Discharge Disposition: Final discharge disposition not confirmed  Discharge Condition: stable  Discharge with Lines: no    Discharge Diet: cardiac diet  Activity Restrictions: none  Test Results Pending at Discharge: [address]    Discharge Diagnoses:  Principal Problem:    Pneumonia due to COVID-19 virus  Active Problems:    Atrial fibrillation with RVR ([facility] [address] )    Acute respiratory failure with hypoxia ([facility] [address] )    Transaminitis  Resolved Problems:    Lactic acidosis    Sepsis ([facility] [address] )      Consulting Providers:  Morgan Jaffe DO Cardiology    Diagnostic & Therapeutic Procedures Performed:  Xr Chest Portable    Result Date: 3/10/2021  Impression: Worsening of bilateral pulmonary infiltrates Workstation performed: [id number]     Xr Chest 1 View Portable    Result Date: 3/8/2021  Impression: Diffuse patchy groundglass opacity throughout the lung fields  No effusions or pneumothorax  In the setting of clinically suspected/proven COVID-19, this plain film appearance while nonspecific, can be seen in cases of viral pneumonia such as COVID-19  Workstation performed: [id number]     Ct Head Without Contrast    Result Date: 3/7/2021  Impression: Acute pansinusitis  No sign of an acute transcortical infarction  No acute intracranial hemorrhage  Workstation performed: [id number]     Cta Ed Chest Pe Study    Result Date: 3/7/2021  Impression: No evidence of pulmonary emboli  Extensive groundglass infiltrates related to Covid pneumonia  Trace pleural effusions   Mild mediastinal and hilar adenopathy Workstation performed: [id number]       Code Status: Level 1 - Full Code  Advance Directive & Living Will: <no information>  Power of :    POLST:      Medications:  Current Discharge Medication List        Current Discharge Medication List      START taking these medications    Details   dexamethasone (DECADRON) 6 mg tablet Take 1 tablet (6 mg total) by mouth daily with breakfast for 2 days  Qty: 2 tablet, Refills: 0    Associated Diagnoses: Pneumonia due to COVID-19 virus      digoxin (LANOXIN) 0 125 mg tablet Take 1 tablet (125 mcg total) by mouth daily  Qty: 30 tablet, Refills: 0    Associated Diagnoses: Atrial fibrillation with RVR (HCC)      metoprolol tartrate (LOPRESSOR) 25 mg tablet Take 1 tablet (25 mg total) by mouth every 12 (twelve) hours  Qty: 30 tablet, Refills: 0    Associated Diagnoses: Atrial fibrillation with RVR (HCC)      multivitamin-minerals (CENTRUM ADULTS) tablet Take 1 tablet by mouth daily  Qty: 30 tablet, Refills: 0    Associated Diagnoses: Pneumonia due to COVID-19 virus      rivaroxaban (XARELTO) 20 mg tablet Take 1 tablet (20 mg total) by mouth daily with breakfast  Qty: 30 tablet, Refills: 1    Associated Diagnoses: Atrial fibrillation with RVR ([address] )           Current Discharge Medication List          Allergies:  No Known Allergies    FOLLOW-UP     PCP Outpatient Follow-up:  Establish care with PCP in 1-2 weeks    Consulting Providers Follow-up:  Follow-up with cardiology in 4 weeks    Active Issues Requiring Follow-up:   COVID-19 pneumonia  Atrial fibrillation    Discharge Statement:   I spent 30 minutes minutes discharging the patient  This time was spent on the day of discharge  I had direct contact with the patient on the day of discharge  Additional documentation is required if more than 30 minutes were spent on discharge  Portions of the record may have been created with voice recognition software  Occasional wrong word or "sound a like" substitutions may have occurred due to the inherent limitations of voice recognition software    Read the chart carefully and recognize, using context, where substitutions have occurred     ==  Kostas Sosa, [facility]  Internal Medicine Resident PGY-2

## 2021-03-17 VITALS
OXYGEN SATURATION: 96 % | SYSTOLIC BLOOD PRESSURE: 148 MMHG | HEIGHT: 68 IN | BODY MASS INDEX: 19.28 KG/M2 | WEIGHT: 127.2 LBS | DIASTOLIC BLOOD PRESSURE: 82 MMHG | TEMPERATURE: 98.6 F | HEART RATE: 59 BPM | RESPIRATION RATE: 18 BRPM

## 2021-03-17 LAB
A1AT SERPL-MCNC: 198 MG/DL (ref 101–187)
ACTIN IGG SERPL-ACNC: 8 UNITS (ref 0–19)
ANA HOMOGEN SER QL IF: NORMAL
ANA HOMOGEN TITR SER: NORMAL {TITER}
CERULOPLASMIN SERPL-MCNC: 28.7 MG/DL (ref 16–31)
LKM-1 AB SER-ACNC: <20.1 UNITS (ref 0–20)
MITOCHONDRIA M2 IGG SER-ACNC: <20 UNITS (ref 0–20)
RYE IGE QN: POSITIVE

## 2021-03-18 LAB
IGG SERPL-MCNC: 1396 MG/DL (ref 603–1613)
IGG1 SER-MCNC: 806 MG/DL (ref 248–810)
IGG2 SER-MCNC: 305 MG/DL (ref 130–555)
IGG3 SER-MCNC: 46 MG/DL (ref 15–102)
IGG4 SER-MCNC: 106 MG/DL (ref 2–96)

## 2021-03-22 ENCOUNTER — TELEPHONE (OUTPATIENT)
Dept: INTERNAL MEDICINE CLINIC | Facility: CLINIC | Age: 76
End: 2021-03-22

## 2021-03-22 NOTE — TELEPHONE ENCOUNTER
Received call from patient's daughter Sol Julien  Patient was discharged on 03/16/2021 for severe COVID infection as well as atrial fibrillation with RVR  He was weaned to regular nasal cannula was discharged with home oxygen  He was stable for approximately 24 hours before declining per daughter  He did experience fevers and chills  Daughter lives at taken him to the hospital  where he was found to have "a collapsed lung "  She had questions with regards to his clinical course and what she should do next  Educated her that I cannot make conjecture on patient's clinical course as I am not currently taking care of him  She had questions with regards to high-flow nasal cannula versus ventilation  Did explain the basics of these concepts to her  She was grateful for the call  Did encourage her to keep in contact with doctors at the [facility] where her father is currently